# Patient Record
Sex: FEMALE | Race: WHITE | NOT HISPANIC OR LATINO | Employment: FULL TIME | URBAN - METROPOLITAN AREA
[De-identification: names, ages, dates, MRNs, and addresses within clinical notes are randomized per-mention and may not be internally consistent; named-entity substitution may affect disease eponyms.]

---

## 2018-03-05 ENCOUNTER — OFFICE VISIT (OUTPATIENT)
Dept: URGENT CARE | Facility: CLINIC | Age: 63
End: 2018-03-05
Payer: COMMERCIAL

## 2018-03-05 ENCOUNTER — TELEPHONE (OUTPATIENT)
Dept: INTERNAL MEDICINE | Facility: CLINIC | Age: 63
End: 2018-03-05

## 2018-03-05 VITALS
HEIGHT: 67 IN | RESPIRATION RATE: 16 BRPM | WEIGHT: 205 LBS | BODY MASS INDEX: 32.18 KG/M2 | DIASTOLIC BLOOD PRESSURE: 76 MMHG | HEART RATE: 61 BPM | SYSTOLIC BLOOD PRESSURE: 137 MMHG | TEMPERATURE: 98 F | OXYGEN SATURATION: 96 %

## 2018-03-05 DIAGNOSIS — S99.911A RIGHT ANKLE INJURY, INITIAL ENCOUNTER: ICD-10-CM

## 2018-03-05 DIAGNOSIS — S82.839A CLOSED FRACTURE OF DISTAL END OF FIBULA, UNSPECIFIED FRACTURE MORPHOLOGY, INITIAL ENCOUNTER: Primary | ICD-10-CM

## 2018-03-05 PROCEDURE — 99214 OFFICE O/P EST MOD 30 MIN: CPT | Mod: S$GLB,,, | Performed by: NURSE PRACTITIONER

## 2018-03-05 NOTE — TELEPHONE ENCOUNTER
Spoke with pt, she hasn't been seen since 2015. Offered pt UC appt for today, pt declined, stated that she will go to UC clinic on [address] that is closer to her house.

## 2018-03-05 NOTE — TELEPHONE ENCOUNTER
----- Message from Grace Lundberg sent at 3/5/2018 10:38 AM CST -----  Contact: self  Patient states need to speak with nurse.  Pt states was walking twisted right ankle   Pt requesting Xray.   Please call pt at [phone]

## 2018-03-06 NOTE — PROGRESS NOTES
"Subjective:       Patient ID: Nano Sosa is a 62 y.o. female.    Vitals:  height is 5' 7" (1.702 m) and weight is 93 kg (205 lb). Her oral temperature is 98.3 °F (36.8 °C). Her blood pressure is 137/76 and her pulse is 61. Her respiration is 16 and oxygen saturation is 96%.     Chief Complaint: Fall (rolled right ankle-right ankle pain)    Patient reports lost balance rolling on her right ankle and fell yesterday. She has compressed and iced the site, but it is still painful and slightly swollen.  It is just beginning to bruise.  She states she had a fibular fracture in distant past at a higher level.      Fall   The accident occurred 12 to 24 hours ago. The fall occurred while walking. She landed on concrete. There was no blood loss. The point of impact was the right foot. The pain is present in the right foot. The pain is at a severity of 6/10. The pain is moderate. The symptoms are aggravated by ambulation and pressure on injury. Pertinent negatives include no abdominal pain, hematuria or numbness. She has tried acetaminophen for the symptoms. The treatment provided no relief.     Review of Systems   Constitution: Negative for malaise/fatigue.   HENT: Negative for nosebleeds.    Cardiovascular: Negative for chest pain and syncope.   Respiratory: Negative for shortness of breath.    Musculoskeletal: Positive for falls, joint pain and joint swelling. Negative for back pain and neck pain.        Right ankle "rolled" and fell yesterday    Gastrointestinal: Negative for abdominal pain.   Genitourinary: Negative for hematuria.   Neurological: Negative for dizziness and numbness.       Objective:      Physical Exam   Constitutional: She is oriented to person, place, and time. She appears well-developed and well-nourished. She is cooperative.  Non-toxic appearance. She does not appear ill. No distress.   HENT:   Head: Normocephalic and atraumatic. Head is without abrasion, without contusion and without " laceration.   Right Ear: Hearing, tympanic membrane, external ear and ear canal normal. No hemotympanum.   Left Ear: Hearing, tympanic membrane, external ear and ear canal normal. No hemotympanum.   Nose: Nose normal. No mucosal edema, rhinorrhea or nasal deformity. No epistaxis. Right sinus exhibits no maxillary sinus tenderness and no frontal sinus tenderness. Left sinus exhibits no maxillary sinus tenderness and no frontal sinus tenderness.   Mouth/Throat: Uvula is midline and mucous membranes are normal. No trismus in the jaw. Normal dentition. No uvula swelling. No posterior oropharyngeal erythema.   Eyes: Conjunctivae, EOM and lids are normal. Pupils are equal, round, and reactive to light. Right eye exhibits no discharge. Left eye exhibits no discharge. No scleral icterus.   Sclera clear bilat   Neck: Trachea normal, normal range of motion, full passive range of motion without pain and phonation normal. Neck supple. No spinous process tenderness and no muscular tenderness present. No neck rigidity. No tracheal deviation present.   Cardiovascular: Normal rate, regular rhythm, normal heart sounds, intact distal pulses and normal pulses.    Pulses:       Posterior tibial pulses are 2+ on the right side.   Pulmonary/Chest: Effort normal and breath sounds normal. No respiratory distress.   Abdominal: Normal appearance. She exhibits no pulsatile midline mass.   Musculoskeletal: She exhibits no edema or deformity.        Right ankle: She exhibits decreased range of motion, swelling and ecchymosis. She exhibits no deformity, no laceration and normal pulse. Tenderness. Lateral malleolus tenderness found.        Feet:    Neurological: She is alert and oriented to person, place, and time. She has normal strength. No cranial nerve deficit or sensory deficit. She exhibits normal muscle tone. She displays no seizure activity. Coordination normal. GCS eye subscore is 4. GCS verbal subscore is 5. GCS motor subscore is 6.    Skin: Skin is warm, dry and intact. Capillary refill takes less than 2 seconds. No abrasion, no bruising, no burn, no ecchymosis and no laceration noted. She is not diaphoretic. No pallor.   Psychiatric: She has a normal mood and affect. Her speech is normal and behavior is normal. Judgment and thought content normal. Cognition and memory are normal.   Nursing note and vitals reviewed.      Right ankle Xray:  Closed non-displaced distal fibula fracture  (Radiology interpretation feels it is of a chronic or remote origin)  Assessment:       1. Closed fracture of distal end of fibula, unspecified fracture morphology, initial encounter    2. Right ankle injury, initial encounter        Plan:         Closed fracture of distal end of fibula, unspecified fracture morphology, initial encounter  -     Ambulatory referral to Orthopedics  -     ORTHOPEDIC BRACING FOR HOME USE - LOWER EXTREMITY  -     CRUTCHES FOR HOME USE    Right ankle injury, initial encounter  -     X-Ray Ankle Complete Right; Future; Expected date: 03/05/2018  -     Ambulatory referral to Orthopedics    Offered patient medication to deal with pain and she declined preferring OTC pain med.    Patient Instructions     Ankle Fracture, Distal Fibula  You have a fracture, or broken bone, of the end of the fibula bone. The fibula is one of two bones that support the ankle joint.    Home care  · You will be given a splint, cast, or special boot to prevent movement at the injury site. Do not put weight on a splint. It will break. Follow your healthcare providers advice about when to begin bearing weight on a cast or boot.  · Keep your leg elevated when sitting or lying down. When sleeping, place a pillow under the injured leg. When sitting, support the injured leg so it is level with your waist. This is very important during the first 48 hours.  · Keep the cast or splint completely dry at all times. When bathing, protect the cast or splint with 2 large plastic  bags. Place 1 bag outside of the other. Tape each bag with duct tape at the top end. Water can still leak in even when the foot is covered. So it's best to keep the cast, splint, or boot away from water. If a fiberglass cast or splint gets wet, dry it with a hair dryer on a cool setting.  · Place an ice pack over the injured area for no more than 15 to 20 minutes. Do this every 3 to 6 hours for the first 24 to 48 hours. Continue this 3 to 4 times a day as needed. To make an ice pack, put ice cubes in a plastic bag that seals at the top. Wrap the bag in a clean, thin towel or cloth. Never put ice or an ice pack directly on the skin. The ice pack can be put right on the cast or splint. As the ice melts, be careful that the cast or splint doesnt get wet.  · You may use over-the-counter pain medicine to control pain, unless another pain medicine was prescribed. If you have chronic liver or kidney disease or ever had a stomach ulcer or GI bleeding, talk with your provider before using these medicines.  Follow-up care  Follow up with your healthcare provider in 1 week, or as advised. This is to be sure the bone is healing properly. If you were given a splint, it may be changed to a cast after the swelling goes down.  If X-rays were taken, you will be told of any new findings that may affect your care.  When to seek medical advice  Call your healthcare provider right away if any of these occur:  · The plaster cast or splint becomes wet or soft  · The fiberglass cast or splint stays wet for more than 24 hours  · There is increased tightness or pain under the cast or splint  · Your toes become swollen, cold, blue, numb, or tingly  · The cast becomes loose  · The cast has a bad smell  · Sore areas develop under the cast  · The cast develops cracks or breaks   Date Last Reviewed: 11/23/2015  © 7293-0374 [employer]. [address] [place] [address], [place] [address]. All rights reserved. This information is not intended  as a substitute for professional medical care. Always follow your healthcare professional's instructions.

## 2018-03-06 NOTE — PATIENT INSTRUCTIONS
Ankle Fracture, Distal Fibula  You have a fracture, or broken bone, of the end of the fibula bone. The fibula is one of two bones that support the ankle joint.    Home care  · You will be given a splint, cast, or special boot to prevent movement at the injury site. Do not put weight on a splint. It will break. Follow your healthcare providers advice about when to begin bearing weight on a cast or boot.  · Keep your leg elevated when sitting or lying down. When sleeping, place a pillow under the injured leg. When sitting, support the injured leg so it is level with your waist. This is very important during the first 48 hours.  · Keep the cast or splint completely dry at all times. When bathing, protect the cast or splint with 2 large plastic bags. Place 1 bag outside of the other. Tape each bag with duct tape at the top end. Water can still leak in even when the foot is covered. So it's best to keep the cast, splint, or boot away from water. If a fiberglass cast or splint gets wet, dry it with a hair dryer on a cool setting.  · Place an ice pack over the injured area for no more than 15 to 20 minutes. Do this every 3 to 6 hours for the first 24 to 48 hours. Continue this 3 to 4 times a day as needed. To make an ice pack, put ice cubes in a plastic bag that seals at the top. Wrap the bag in a clean, thin towel or cloth. Never put ice or an ice pack directly on the skin. The ice pack can be put right on the cast or splint. As the ice melts, be careful that the cast or splint doesnt get wet.  · You may use over-the-counter pain medicine to control pain, unless another pain medicine was prescribed. If you have chronic liver or kidney disease or ever had a stomach ulcer or GI bleeding, talk with your provider before using these medicines.  Follow-up care  Follow up with your healthcare provider in 1 week, or as advised. This is to be sure the bone is healing properly. If you were given a splint, it may be changed to a  cast after the swelling goes down.  If X-rays were taken, you will be told of any new findings that may affect your care.  When to seek medical advice  Call your healthcare provider right away if any of these occur:  · The plaster cast or splint becomes wet or soft  · The fiberglass cast or splint stays wet for more than 24 hours  · There is increased tightness or pain under the cast or splint  · Your toes become swollen, cold, blue, numb, or tingly  · The cast becomes loose  · The cast has a bad smell  · Sore areas develop under the cast  · The cast develops cracks or breaks   Date Last Reviewed: 11/23/2015  © 2527-7239 [employer]. [address] [place] [address], [place] [address]. All rights reserved. This information is not intended as a substitute for professional medical care. Always follow your healthcare professional's instructions.

## 2018-03-08 ENCOUNTER — TELEPHONE (OUTPATIENT)
Dept: INTERNAL MEDICINE | Facility: CLINIC | Age: 63
End: 2018-03-08

## 2018-03-08 ENCOUNTER — TELEPHONE (OUTPATIENT)
Dept: URGENT CARE | Facility: CLINIC | Age: 63
End: 2018-03-08

## 2018-03-08 DIAGNOSIS — Z12.31 SCREENING MAMMOGRAM, ENCOUNTER FOR: Primary | ICD-10-CM

## 2018-03-08 NOTE — TELEPHONE ENCOUNTER
----- Message from Shawna Fowler sent at 3/8/2018  8:20 AM CST -----  Contact: YouTube request  Message     Appointment Request From: Nano Sosa    With Provider: Other - (see comments)    Would Accept With:Only the person I've selected    Preferred Date Range: From 3/12/2018 To 3/16/2018    Preferred Times:     Monday, Wednesday, Thursday     Any time    Reason for visit: Request an Appt    Comments:  Need a Mammogram

## 2018-03-08 NOTE — TELEPHONE ENCOUNTER
Spoke with pt, transfer care appt made for May. Pt would like to have mammogram she is overdue for. Please, sign the pending order.

## 2018-03-14 ENCOUNTER — OFFICE VISIT (OUTPATIENT)
Dept: INTERNAL MEDICINE | Facility: CLINIC | Age: 63
End: 2018-03-14
Payer: COMMERCIAL

## 2018-03-14 ENCOUNTER — LAB VISIT (OUTPATIENT)
Dept: LAB | Facility: HOSPITAL | Age: 63
End: 2018-03-14
Attending: NURSE PRACTITIONER
Payer: COMMERCIAL

## 2018-03-14 VITALS
WEIGHT: 220.88 LBS | BODY MASS INDEX: 34.67 KG/M2 | HEIGHT: 67 IN | HEART RATE: 61 BPM | SYSTOLIC BLOOD PRESSURE: 118 MMHG | DIASTOLIC BLOOD PRESSURE: 74 MMHG | OXYGEN SATURATION: 97 %

## 2018-03-14 DIAGNOSIS — R31.9 HEMATURIA, UNSPECIFIED TYPE: ICD-10-CM

## 2018-03-14 DIAGNOSIS — Z00.00 ANNUAL PHYSICAL EXAM: Primary | ICD-10-CM

## 2018-03-14 DIAGNOSIS — R31.9 HEMATURIA, UNSPECIFIED TYPE: Primary | ICD-10-CM

## 2018-03-14 DIAGNOSIS — N89.8 VAGINAL DISCHARGE: ICD-10-CM

## 2018-03-14 DIAGNOSIS — Z86.39 HISTORY OF HASHIMOTO THYROIDITIS: ICD-10-CM

## 2018-03-14 DIAGNOSIS — E04.1 THYROID NODULE: ICD-10-CM

## 2018-03-14 DIAGNOSIS — M85.80 OSTEOPENIA, UNSPECIFIED LOCATION: ICD-10-CM

## 2018-03-14 LAB
AMORPH CRY UR QL COMP ASSIST: ABNORMAL
BACTERIA #/AREA URNS AUTO: ABNORMAL /HPF
BILIRUB SERPL-MCNC: NORMAL MG/DL
BILIRUB UR QL STRIP: NEGATIVE
BLOOD URINE, POC: NORMAL
CLARITY UR REFRACT.AUTO: ABNORMAL
COLOR UR AUTO: YELLOW
COLOR, POC UA: NORMAL
GLUCOSE UR QL STRIP: NEGATIVE
GLUCOSE UR QL STRIP: NORMAL
HGB UR QL STRIP: ABNORMAL
KETONES UR QL STRIP: NEGATIVE
KETONES UR QL STRIP: NORMAL
LEUKOCYTE ESTERASE UR QL STRIP: ABNORMAL
LEUKOCYTE ESTERASE URINE, POC: NORMAL
MICROSCOPIC COMMENT: ABNORMAL
NITRITE UR QL STRIP: POSITIVE
NITRITE, POC UA: NORMAL
PH UR STRIP: 5 [PH] (ref 5–8)
PH, POC UA: 5
PROT UR QL STRIP: NEGATIVE
PROTEIN, POC: NORMAL
RBC #/AREA URNS AUTO: 2 /HPF (ref 0–4)
SP GR UR STRIP: 1.01 (ref 1–1.03)
SPECIFIC GRAVITY, POC UA: 1.01
URN SPEC COLLECT METH UR: ABNORMAL
UROBILINOGEN UR STRIP-ACNC: NEGATIVE EU/DL
UROBILINOGEN, POC UA: NORMAL
WBC #/AREA URNS AUTO: 3 /HPF (ref 0–5)

## 2018-03-14 PROCEDURE — 81002 URINALYSIS NONAUTO W/O SCOPE: CPT | Mod: S$GLB,,, | Performed by: NURSE PRACTITIONER

## 2018-03-14 PROCEDURE — 87088 URINE BACTERIA CULTURE: CPT

## 2018-03-14 PROCEDURE — 81001 URINALYSIS AUTO W/SCOPE: CPT

## 2018-03-14 PROCEDURE — 87186 SC STD MICRODIL/AGAR DIL: CPT

## 2018-03-14 PROCEDURE — 87077 CULTURE AEROBIC IDENTIFY: CPT

## 2018-03-14 PROCEDURE — 87086 URINE CULTURE/COLONY COUNT: CPT

## 2018-03-14 PROCEDURE — 99396 PREV VISIT EST AGE 40-64: CPT | Mod: 25,S$GLB,, | Performed by: NURSE PRACTITIONER

## 2018-03-14 PROCEDURE — 99999 PR PBB SHADOW E&M-EST. PATIENT-LVL IV: CPT | Mod: PBBFAC,,, | Performed by: NURSE PRACTITIONER

## 2018-03-14 NOTE — PROGRESS NOTES
"Internal Medicine Annual Exam       CHIEF COMPLAINT     The patient, Nano Sosa, who is a 62 y.o. female presents for an annual exam.    HPI   62 year old female with thyroid nodule, hashimoto's thyroiditis, UC, osteopenia and h/o colon polyps/rectal mass - here for annual physical   Pt of Dr. Rodriguez.     Weight gain- over the last few months. Stopped exercising 2/2 fatigue and joint pain. "too much for me with work and everything else".     Vaginal discharge- hysterectomy 2/2 uterine fibroids. Grey-green color. Denies itching and burning. +dysuria; feels like she about to get a UTI     UC- h/o UC was treated by holistic practitioner who treated her, has not had issues since.     C-scope- 2016 repeat in 2 years.   MMG- needed has scheduled.    Gyn- needed for WWE     Past Medical History:  Past Medical History:   Diagnosis Date    Allergy     General anesthetics causing adverse effect in therapeutic use     wakes up hyperventilating/chills    Goiter     Hyperthyroidism     Hashimoto's    Urinary tract infection        Past Surgical History:   Procedure Laterality Date    BUNIONECTOMY      right    colon sx      COLONOSCOPY N/A 12/19/2016    Procedure: COLONOSCOPY;  Surgeon: Alli Peña MD;  Location: [address]);  Service: Endoscopy;  Laterality: N/A;    f.e.s.s.  12/5/2014    FACIAL COSMETIC SURGERY      HYSTERECTOMY      partial        Family History   Problem Relation Age of Onset    Rheum arthritis Mother     Migraines Mother     Cancer Maternal Aunt     Heart disease Maternal Uncle     Anesthesia problems Neg Hx         Social History     Social History    Marital status: Single     Spouse name: N/A    Number of children: N/A    Years of education: N/A     Occupational History    Not on file.     Social History Main Topics    Smoking status: Former Smoker     Packs/day: 1.00     Years: 30.00     Types: Cigarettes     Quit date: 8/21/1999    Smokeless tobacco: Never " Used    Alcohol use 1.2 oz/week     2 Standard drinks or equivalent per week      Comment: weekends    Drug use: No    Sexual activity: Yes     Partners: Male     Other Topics Concern    Not on file     Social History Narrative    No narrative on file        History   Smoking Status    Former Smoker    Packs/day: 1.00    Years: 30.00    Types: Cigarettes    Quit date: 8/21/1999   Smokeless Tobacco    Never Used        Allergies as of 03/14/2018 - Reviewed 03/14/2018   Allergen Reaction Noted    Penicillins Shortness Of Breath 11/09/2012    Codeine Itching 11/09/2012    Sulfa (sulfonamide antibiotics) Hives and Swelling 11/09/2012    Tessalon [benzonatate] Swelling 11/09/2012    Unable to assess  06/30/2016    Erythromycin Rash 11/09/2012          Home Medications:  Prior to Admission medications    Medication Sig Start Date End Date Taking? Authorizing Provider   b complex vitamins tablet Take 1 tablet by mouth once daily.   Yes Historical Provider, MD   cholecalciferol, vitamin D3, 2,000 unit Tab Take 1 tablet by mouth once daily.   Yes Historical Provider, MD   multivitamin capsule Take 1 capsule by mouth once daily.   Yes Historical Provider, MD   sodium,potassium,mag sulfates (SUPREP BOWEL PREP KIT) 17.5-3.13-1.6 gram SolR Take 1 kit by mouth as directed. 10/3/16   Alli Peña MD       Review of Systems:  Review of Systems   Constitutional: Positive for fatigue and unexpected weight change (weight gain. ). Negative for activity change, chills and fever.   HENT: Negative for hearing loss, rhinorrhea and trouble swallowing.    Eyes: Negative for discharge and visual disturbance.   Respiratory: Positive for cough (pnd). Negative for chest tightness, shortness of breath and wheezing.    Cardiovascular: Negative for chest pain and palpitations.   Gastrointestinal: Negative for blood in stool, constipation, diarrhea and vomiting.   Endocrine: Positive for cold intolerance. Negative for heat  "intolerance, polydipsia and polyuria.   Genitourinary: Positive for vaginal discharge. Negative for difficulty urinating, dysuria, hematuria, menstrual problem and vaginal bleeding.   Musculoskeletal: Negative for arthralgias, joint swelling and neck pain.   Skin: Negative for rash.   Neurological: Negative for dizziness, weakness, light-headedness and headaches.   Psychiatric/Behavioral: Negative for confusion, dysphoric mood and sleep disturbance. The patient is not nervous/anxious.        Health Maintainence:   Immunizations:  Health Maintenance       Date Due Completion Date    TETANUS VACCINE 04/29/1971 ---    Influenza Vaccine 08/01/2017 12/17/2015 (Declined)    Override on 12/17/2015: Declined    Mammogram 12/18/2017 12/18/2015    Lipid Panel 12/18/2020 12/18/2015    Colonoscopy 12/19/2026 12/19/2016           PHYSICAL EXAM     /74 (BP Location: Left arm, Patient Position: Sitting, BP Method: Large (Manual))   Pulse 61   Ht 5' 7" (1.702 m)   Wt 100.2 kg (220 lb 14.4 oz)   SpO2 97%   BMI 34.60 kg/m²  Body mass index is 34.6 kg/m².    Physical Exam   Constitutional: She is oriented to person, place, and time. She appears well-developed and well-nourished.   HENT:   Head: Normocephalic.   Right Ear: External ear normal.   Left Ear: External ear normal.   Nose: Nose normal.   Mouth/Throat: Oropharynx is clear and moist. No oropharyngeal exudate.   Eyes: Pupils are equal, round, and reactive to light.   Neck: Neck supple. No JVD present. No tracheal deviation present. No thyromegaly present.   Cardiovascular: Normal rate, regular rhythm, normal heart sounds and intact distal pulses.  Exam reveals no gallop and no friction rub.    No murmur heard.  Pulmonary/Chest: Effort normal and breath sounds normal. No respiratory distress. She has no wheezes. She has no rales.   Abdominal: Soft. Bowel sounds are normal. She exhibits no distension. There is no tenderness.   Musculoskeletal: Normal range of motion. " She exhibits no edema or tenderness.   Lymphadenopathy:     She has no cervical adenopathy.   Neurological: She is alert and oriented to person, place, and time.   Skin: Skin is warm and dry. No rash noted.   Psychiatric: She has a normal mood and affect. Her behavior is normal.   Vitals reviewed.      LABS     Lab Results   Component Value Date    HGBA1C 5.3 12/18/2015     CMP  Sodium   Date Value Ref Range Status   07/21/2016 138 136 - 145 mmol/L Final     Potassium   Date Value Ref Range Status   07/21/2016 3.9 3.5 - 5.1 mmol/L Final     Chloride   Date Value Ref Range Status   07/21/2016 107 95 - 110 mmol/L Final     CO2   Date Value Ref Range Status   07/21/2016 21 (L) 23 - 29 mmol/L Final     Glucose   Date Value Ref Range Status   07/21/2016 105 70 - 110 mg/dL Final     BUN, Bld   Date Value Ref Range Status   07/21/2016 13 8 - 23 mg/dL Final     Creatinine   Date Value Ref Range Status   07/21/2016 0.7 0.5 - 1.4 mg/dL Final     Calcium   Date Value Ref Range Status   07/21/2016 8.6 (L) 8.7 - 10.5 mg/dL Final     Total Protein   Date Value Ref Range Status   07/15/2016 6.9 6.0 - 8.4 g/dL Final     Albumin   Date Value Ref Range Status   07/15/2016 3.9 3.5 - 5.2 g/dL Final     Total Bilirubin   Date Value Ref Range Status   07/15/2016 0.7 0.1 - 1.0 mg/dL Final     Comment:     For infants and newborns, interpretation of results should be based  on gestational age, weight and in agreement with clinical  observations.  Premature Infant recommended reference ranges:  Up to 24 hours.............<8.0 mg/dL  Up to 48 hours............<12.0 mg/dL  3-5 days..................<15.0 mg/dL  6-29 days.................<15.0 mg/dL       Alkaline Phosphatase   Date Value Ref Range Status   07/15/2016 56 55 - 135 U/L Final     AST   Date Value Ref Range Status   07/15/2016 14 10 - 40 U/L Final     ALT   Date Value Ref Range Status   07/15/2016 13 10 - 44 U/L Final     Anion Gap   Date Value Ref Range Status   07/21/2016 10 8 -  16 mmol/L Final     eGFR if    Date Value Ref Range Status   07/21/2016 >60.0 >60 mL/min/1.73 m^2 Final     eGFR if non    Date Value Ref Range Status   07/21/2016 >60.0 >60 mL/min/1.73 m^2 Final     Comment:     Calculation used to obtain the estimated glomerular filtration  rate (eGFR) is the CKD-EPI equation. Since race is unknown   in our information system, the eGFR values for   -American and Non--American patients are given   for each creatinine result.       Lab Results   Component Value Date    WBC 12.42 07/21/2016    HGB 11.6 (L) 07/21/2016    HCT 36.5 (L) 07/21/2016    MCV 93 07/21/2016     07/21/2016     Lab Results   Component Value Date    CHOL 240 (H) 12/18/2015    CHOL 170 10/02/2014    CHOL 195 08/20/2011     Lab Results   Component Value Date    HDL 75 12/18/2015    HDL 58 10/02/2014    HDL 48 08/20/2011     Lab Results   Component Value Date    LDLCALC 141.0 12/18/2015    LDLCALC 93.8 10/02/2014    LDLCALC 118.0 08/20/2011     Lab Results   Component Value Date    TRIG 120 12/18/2015    TRIG 91 10/02/2014    TRIG 145 08/20/2011     Lab Results   Component Value Date    CHOLHDL 31.3 12/18/2015    CHOLHDL 34.1 10/02/2014    CHOLHDL 24.6 08/20/2011     Lab Results   Component Value Date    TSH 2.255 12/18/2015       ASSESSMENT/PLAN     Nano Sosa is a 62 y.o. female with  Past Medical History:   Diagnosis Date    Allergy     General anesthetics causing adverse effect in therapeutic use     wakes up hyperventilating/chills    Goiter     Hyperthyroidism     Hashimoto's    Urinary tract infection        Annual physical exam- all age and gender related screenings discussed   -     CBC auto differential; Future; Expected date: 03/14/2018  -     Comprehensive metabolic panel; Future; Expected date: 03/14/2018  -     TSH; Future; Expected date: 03/14/2018  -     T4, free; Future; Expected date: 03/14/2018  -     Lipid panel; Future; Expected  date: 03/14/2018  -     Vitamin D; Future; Expected date: 03/14/2018  -     POCT urine dipstick without microscope    History of Hashimoto thyroiditis- weight gain, will check thyroid studies. Discussed diet and exercise.   -     THYROID PEROXIDASE ANTIBODY; Future; Expected date: 03/14/2018    Thyroid nodule- will check thyroid studies.     Osteopenia, unspecified location- stable, repeat Dexa in December     Vaginal discharge- u/a today, refer to gyn - needs wwe.   -     Ambulatory Referral to Gynecology          Follow up with PCP in 6 months     Heydi NAZARIO, APRN, FNP-c   Department of Internal Medicine - [facility]  8:36 AM

## 2018-03-16 ENCOUNTER — PATIENT MESSAGE (OUTPATIENT)
Dept: INTERNAL MEDICINE | Facility: CLINIC | Age: 63
End: 2018-03-16

## 2018-03-16 DIAGNOSIS — N39.0 URINARY TRACT INFECTION WITHOUT HEMATURIA, SITE UNSPECIFIED: Primary | ICD-10-CM

## 2018-03-16 RX ORDER — CIPROFLOXACIN 250 MG/1
250 TABLET, FILM COATED ORAL EVERY 12 HOURS
Qty: 6 TABLET | Refills: 0 | Status: SHIPPED | OUTPATIENT
Start: 2018-03-16 | End: 2018-03-20

## 2018-03-17 LAB — BACTERIA UR CULT: NORMAL

## 2018-03-19 ENCOUNTER — PATIENT MESSAGE (OUTPATIENT)
Dept: INTERNAL MEDICINE | Facility: CLINIC | Age: 63
End: 2018-03-19

## 2018-03-19 ENCOUNTER — HOSPITAL ENCOUNTER (OUTPATIENT)
Dept: RADIOLOGY | Facility: HOSPITAL | Age: 63
Discharge: HOME | End: 2018-03-19
Attending: INTERNAL MEDICINE
Payer: COMMERCIAL

## 2018-03-19 DIAGNOSIS — Z12.31 SCREENING MAMMOGRAM, ENCOUNTER FOR: ICD-10-CM

## 2018-03-19 PROCEDURE — 77067 SCR MAMMO BI INCL CAD: CPT | Mod: 26,,, | Performed by: RADIOLOGY

## 2018-03-19 PROCEDURE — 77063 BREAST TOMOSYNTHESIS BI: CPT | Mod: 26,,, | Performed by: RADIOLOGY

## 2018-03-19 PROCEDURE — 77067 SCR MAMMO BI INCL CAD: CPT | Mod: TC

## 2018-03-20 ENCOUNTER — TELEPHONE (OUTPATIENT)
Dept: INTERNAL MEDICINE | Facility: CLINIC | Age: 63
End: 2018-03-20

## 2018-03-20 DIAGNOSIS — N39.0 URINARY TRACT INFECTION WITHOUT HEMATURIA, SITE UNSPECIFIED: Primary | ICD-10-CM

## 2018-03-20 RX ORDER — NITROFURANTOIN 25; 75 MG/1; MG/1
100 CAPSULE ORAL 2 TIMES DAILY
Qty: 14 CAPSULE | Refills: 0 | Status: SHIPPED | OUTPATIENT
Start: 2018-03-20 | End: 2018-04-17 | Stop reason: SDUPTHER

## 2018-03-21 ENCOUNTER — OFFICE VISIT (OUTPATIENT)
Dept: ORTHOPEDICS | Facility: CLINIC | Age: 63
End: 2018-03-21
Payer: COMMERCIAL

## 2018-03-21 ENCOUNTER — HOSPITAL ENCOUNTER (OUTPATIENT)
Dept: RADIOLOGY | Facility: HOSPITAL | Age: 63
Discharge: HOME | End: 2018-03-21
Attending: PHYSICIAN ASSISTANT
Payer: COMMERCIAL

## 2018-03-21 VITALS
HEART RATE: 61 BPM | SYSTOLIC BLOOD PRESSURE: 127 MMHG | WEIGHT: 220.88 LBS | BODY MASS INDEX: 34.67 KG/M2 | HEIGHT: 67 IN | DIASTOLIC BLOOD PRESSURE: 86 MMHG

## 2018-03-21 DIAGNOSIS — R52 PAIN: ICD-10-CM

## 2018-03-21 DIAGNOSIS — M25.371 INSTABILITY OF RIGHT ANKLE JOINT: Primary | ICD-10-CM

## 2018-03-21 PROCEDURE — 99203 OFFICE O/P NEW LOW 30 MIN: CPT | Mod: S$GLB,,, | Performed by: PHYSICIAN ASSISTANT

## 2018-03-21 PROCEDURE — 73610 X-RAY EXAM OF ANKLE: CPT | Mod: 26,RT,, | Performed by: RADIOLOGY

## 2018-03-21 PROCEDURE — 99999 PR PBB SHADOW E&M-EST. PATIENT-LVL III: CPT | Mod: PBBFAC,,, | Performed by: PHYSICIAN ASSISTANT

## 2018-03-21 PROCEDURE — 73610 X-RAY EXAM OF ANKLE: CPT | Mod: TC,RT

## 2018-03-21 RX ORDER — DICLOFENAC SODIUM 10 MG/G
2 GEL TOPICAL 4 TIMES DAILY
Qty: 400 G | Refills: 0 | Status: SHIPPED | OUTPATIENT
Start: 2018-03-21 | End: 2023-03-02

## 2018-03-21 NOTE — LETTER
March 21, 2018      Pacheco Luna, NATALIYA  [address]  [address]  [facility] [address]           [facility] Orthoped[facility]  [address], [address]  [facility] [address]  Phone: [phone]          Patient: Nano Sosa   MR Number: [id number]   YOB: 1955   Date of Visit: 3/21/2018       Dear Pacheco Luna:    Thank you for referring Nano Sosa to me for evaluation. Attached you will find relevant portions of my assessment and plan of care.    If you have questions, please do not hesitate to call me. I look forward to following Nano Sosa along with you.    Sincerely,    Joey Tobar PA-C    Enclosure  CC:  No Recipients    If you would like to receive this communication electronically, please contact [email or web address] or [phone] to request more information on [employer] Link access.    For providers and/or their staff who would like to refer a patient to [facility], please contact us through our one-stop-shop provider referral line, [facility] , at [phone].    If you feel you have received this communication in error or would no longer like to receive these types of communications, please e-mail [email or web address]

## 2018-03-21 NOTE — PROGRESS NOTES
"  SUBJECTIVE:     Chief Complaint & History of Present Illness:  Nano Sosa is a  New  patient 62 y.o. female who is seen here today with a complaint of    Chief Complaint   Patient presents with    Right Ankle - Pain    .  History today for evaluation treatment of some chronic right ankle pain and instability dating back several years.  Her symptoms have been exacerbated lately secondary to an increase in activity and she's having some concerns with the the pain instability as relates to a trip she's planning overseas seen a few months and she states she has had a multiple injuries to the ankles in past years on but has never been formally treated.  She relates "chronicrotations chronic ankle sprains"  On a scale of 1-10, with 10 being worst pain imaginable, he rates this pain as 4 on good days and 5 on bad days.  she describes the pain as tender and sore.    Review of patient's allergies indicates:   Allergen Reactions    Penicillins Shortness Of Breath     Throat swelling    Codeine Itching     Hallucinations, sweats    Sulfa (sulfonamide antibiotics) Hives and Swelling    Tessalon [benzonatate] Swelling    Unable to assess      TUNA--Flushing of skin    Erythromycin Rash         Current Outpatient Prescriptions   Medication Sig Dispense Refill    b complex vitamins tablet Take 1 tablet by mouth once daily.      cholecalciferol, vitamin D3, 2,000 unit Tab Take 1 tablet by mouth once daily.      multivitamin capsule Take 1 capsule by mouth once daily.      nitrofurantoin, macrocrystal-monohydrate, (MACROBID) 100 MG capsule Take 1 capsule (100 mg total) by mouth 2 (two) times daily. 14 capsule 0    sodium,potassium,mag sulfates (SUPREP BOWEL PREP KIT) 17.5-3.13-1.6 gram SolR Take 1 kit by mouth as directed. 354 mL 0    diclofenac sodium 1 % Gel Apply 2 g topically 4 (four) times daily. 400 g 0     No current facility-administered medications for this visit.        Past Medical History: " "  Diagnosis Date    Allergy     General anesthetics causing adverse effect in therapeutic use     wakes up hyperventilating/chills    Goiter     Hyperthyroidism     Hashimoto's    Urinary tract infection        Past Surgical History:   Procedure Laterality Date    BUNIONECTOMY      right    colon sx      COLONOSCOPY N/A 12/19/2016    Procedure: COLONOSCOPY;  Surgeon: Alli Peña MD;  Location: [facility] ([address] [place] [address]);  Service: Endoscopy;  Laterality: N/A;    f.e.s.s.  12/5/2014    FACIAL COSMETIC SURGERY      HYSTERECTOMY      partial       Vital Signs (Most Recent)  Vitals:    03/21/18 0847   BP: 127/86   Pulse: 61       Review of Systems:  ROS:  Constitutional: no fever or chills  Eyes: no visual changes  ENT: no nasal congestion or sore throat  Respiratory: no cough or shortness of breath  Cardiovascular: no chest pain or palpitations  Gastrointestinal: no nausea or vomiting, tolerating diet  Genitourinary: no hematuria or dysuria  Integument/Breast: no rash or pruritis  Hematologic/Lymphatic: no easy bruising or lymphadenopathy, Positive thyroiditis vitamin D deficiency  Musculoskeletal: no arthralgias or myalgias  Neurological: no seizures or tremors  Behavioral/Psych: no auditory or visual hallucinations  Endocrine: no heat or cold intolerance      OBJECTIVE:     PHYSICAL EXAM:  Height: 5' 7" (170.2 cm) Weight: 100.2 kg (220 lb 14.4 oz), General Appearance: Well nourished, well developed, in no acute distress.  Neurological: Mood & affect are normal.  right  Foot/Ankle    Skin: normal  Swelling: none  Warmth: no warmth  Tenderness: mild  ROM: 90 degrees dorsiflexion, 180 degrees plantarflexion, 50 degrees inversion with a soft endpoint and 40 degrees eversion  Strength: normal and 5 on 5  Gait: normal  Stability: anterior drawer: negative, exterior rotation test: negative, Lachman: negative and Cotton test: negative    soft tissue swelling noted over the Medial " malleolus          RADIOGRAPHS:  X-rays taken today films reviewed by me demonstrate mild arthritic changes throughout the ankle with evidence of old fractures in both the medial and lateral malleoli with possible nonunion in the medial malleolus    ASSESSMENT/PLAN:     Plan:  Physical therapy for strength and range of motion exercises of the ankle  Patient will continue with lace up ankle brace for support and protection  Diclofenac 1% gel to affected area 3 times a day when necessary

## 2018-03-22 NOTE — PROGRESS NOTES
PHYSICAL THERAPY INITIAL EVALUATION     Date: 03/23/2018  Name: Nano Sosa  Clinic Number: [id number]    Visit #: 1   Start Time:  7:05  Stop Time:  800  Time in treatment: 55 minutes    Orders:      [facility] ORTHOPEDICS   Priority Start Date Expiration Date Referral Entered By   Routine 03/21/2018 12/31/2018 Joey Tobar PA-C   Visits Requested Visits Authorized Visits Completed Visits Scheduled   1 20 0 1       Diagnosis   M25.371 (ICD-10-CM) - Instability of right ankle joint   Referral Notes   Num      History   History of Present Illness: Pt relates she rolled her right ankle whild simply walking 3 weeks ago .  She has apparently had recurrent ankle sprains on the limb and has xray evidence of old fibular fractures.  She was initially in a cam walker for 1 week after injury but has been wearing shoes and an ankle support since injury    Treatment Diagnosis:   1. Right ankle instability         Past Medical History      Past Medical History:   Diagnosis Date    Allergy     General anesthetics causing adverse effect in therapeutic use     wakes up hyperventilating/chills    Goiter     Hyperthyroidism     Hashimoto's    Urinary tract infection        Allergies  Review of patient's allergies indicates:   Allergen Reactions    Penicillins Shortness Of Breath     Throat swelling    Codeine Itching     Hallucinations, sweats    Sulfa (sulfonamide antibiotics) Hives and Swelling    Tessalon [benzonatate] Swelling    Unable to assess      TUNA--Flushing of skin    Erythromycin Rash       Current Medication list:   Current Outpatient Prescriptions on File Prior to Visit   Medication Sig Dispense Refill    b complex vitamins tablet Take 1 tablet by mouth once daily.      cholecalciferol, vitamin D3, 2,000 unit Tab Take 1 tablet by mouth once daily.      diclofenac sodium 1 % Gel Apply 2 g topically 4 (four) times daily. 400 g 0    multivitamin capsule Take 1 capsule by mouth once daily.       "nitrofurantoin, macrocrystal-monohydrate, (MACROBID) 100 MG capsule Take 1 capsule (100 mg total) by mouth 2 (two) times daily. 14 capsule 0    sodium,potassium,mag sulfates (SUPREP BOWEL PREP KIT) 17.5-3.13-1.6 gram SolR Take 1 kit by mouth as directed. 354 mL 0     No current facility-administered medications on file prior to visit.        Precautions: Standard, Fall      Prior Therapy: no    Diagnostic Tests:   XR ANKLE COMPLETE 3 VIEW RIGHT    CLINICAL HISTORY:  pain;  Pain, unspecified    FINDINGS:  There is baseline DJD.  No fracture dislocation bone destruction seen.  There is a plantar fascial spur.  There is postoperative change of the 1st digit.       Right Ankle - Pain    .  History today for evaluation treatment of some chronic right ankle pain and instability dating back several years.  Her symptoms have been exacerbated lately secondary to an increase in activity and she's having some concerns with the the pain instability as relates to a trip she's planning overseas seen a few months and she states she has had a multiple injuries to the ankles in past years on but has never been formally treated.  She relates "chronicrotations chronic ankle sprains"      Living situation: active  Stairs in home/work?  yes  Are stairs bothersome?:  Descending is painful in anterior ankle   Work status: desk  Sports/Recreational Activities: na  Assistive devices/equipment ankle support on right    Cultural, Spiritual, Developmental and Educational concerns: none  Abuse/Neglect, Nutritional concerns: none     Patient Therapy Goals: improve strength of the right ankle    Subjective   Nano Sosa states she really is not currently having any significant ankle pain.  She is concerned because she is leaving on a  vacation soon and will need to walk a lot.  Thus she wants to strengthen her ankles.  Activities that increase symptoms:   Descending stairs  What decreases symptoms:   rest  Popping/clicking: " denies  Are symptoms changing since onset:  Greatly improved   Pain level with 0 being the lowest and 10 being at onset of treatment:  0  Pain level with 0 being the lowest and 10 being at conclusion of treatment:   0  Pain level at best: 0  Pain level at worst: 3  Has there been a change in functional status since onset of symptoms:   no  Current functional status:  Exercise routine prior to onset :  Bike, treadmill      Objective   62 y.o. White female arrives to clinic in [facility].  She is wearing normal athletic shoes with appropriate custom inserts.  She has an OTC tie up and velcro ankle support on the Right ankle    BP pre-treatment: nt    GAIT: no deviation without AD.    FOOT ALIGNMENT:  pronation    POSTURAL examination in standing normal posture with moderate bilateral foot pronation  JOINT ROM  AROM hips      WFL   AROM knees   WFL       Range of Motion: ANKLES   NORMAL/  FUNCTIONAL RIGHT   AAROM/AROM   Dorsiflexion   20/10' 15   PlantarFlex:   45/20' 40   Inversion      35/10' 40   Eversion   25/10 30               Joint Mobility: hypermobile all LE joints    AROM lumbo/sacral region  WFL  PSIS  level   Leg length discrepancy : none detected    LE MMT                Grossly 5/5 Right                 Grossly 5/5 Left  With following exceptions:                                             RIGHT                                  LEFT                        Inverters               4+/5                                       5/5               Everters                 4+/5                                       5/5             FLEXIBILITY:  Right:  Hamstrings extend to 0 degrees in supine with hip flexed to 90 degrees  Left:  Hamstrings extend  To 0 degrees in supine with hip flexed to 90 degrees  Gastroc/soleus: 15 degrees past midline        PALPATION: Tender to palpation at right calcaneal fibular ligament and peroneal tendons  Crepitus: none noted  Sensation:nt   Muscle  Tone:  Normal  Atrophy noted: no    EDEMA:    Left:absent  Right: minimal at lateral malleolus      TREATMENT: Evaluation completed  TREATMENT:   THEREX: 12 minutes of one to one instruction , manual and/or verbal cues for exercises designed to stretch, strengthen and build endurance of the affected body parts     10 reps Ankle plantar and dorsiflexion with maroon TBand provided to patient  10 reps inversion blue  theraband  provided to patient  10 reps eversion blue  theraband   provided to patient  10 reps up on toes  Ankle ABCs      Will add in next sessions:  Ankle press  10 reps lateral movement on rocker board  10 reps of AP, Lateral, and circular motion on Bosu board  Bilateral stance on Bosu board  Unilateral stance on Bosu board5 reps 10 sec hold Prostretch          Education   Patient was provided with a written copy of the above home exercises indicated in bold to perform as tolerated within limits of pain.  Exercises were reviewed and patient was able to demonstrate them prior to the end of the session.  Pt instructed in proper use of ice or heat to limb.    All of the patients questions were answered  Goals of therapy, the roles of PT and PTA, and the need for compliance of appointments, attendance policy and HEP was discussed with patient .  Patient expressed understanding and agreement with above education.      No barriers to learning or social/cultural issues were identified that could hinder therapy.    Assessment   Pt with recurrent right ankle sprains  felt  No changein symptoms post therapy and suffered no adverse effects with treatment.   .  Pt presents with  a stable and uncomplicated clinical presentation which includes: hypermobility of feet/LEs, weak mm, pronation of feet, painful extended walking   Patient can benefit from outpatient physical therapy and a home program  Prognosis to achieve below goals is good provided pt is compliant with home program and PT appointments..    Pt's spiritual, cultural and educational needs  considered and pt agreeable to plan of care and goals as stated below:    Medical necessity is demonstrated by the following impairments/problem list:    Requires skilled supervision to complete and progress HEP     Decreased standing tolerance    Decreased community ambulation    Impaired muscle strength   Pain    Anticipated barriers to physical therapy: going out of town for vacation    G Code   na           Goals   8weeks. Pt agrees with goals set.  Independent with HEP.  Report decrease in pain in the right  ankle/foot region  to less than 1  For extended standign and walkign greater than 3 blocks.    Increased MMT in 100% of weak mm that are below 5-/5 grade  by 1/3 grade to enable ambulation with appropriate gait cycle  Improve safety of gait and pt to report no falls curing course of therapy.  Goals beyond 8 weeks will be set at that time based on reassessment      Re-assessment due on or before:  4/21/18    PTA may be involved in patient care as part of the Rehab team.      Plan   Pt will be seen 1-2 times per week for 8 weeks.   Recommended Treatment Plan will include:  Manual soft tissue and/or joint mobilization  Therapeutic Exercise  Neuromuscular re-education          Individualized Home Exercise Program  Modalities: heat/ice, estim, US, dry needling as needed         Pt education    PTA may be involved in patient's care as part of the Rehab Team.        History  Co-morbidities and personal factors that may impact the plan of care Examination  Body Structures and Functions, activity limitations and participation restrictions that may impact the plan of care Clinical Presentation   Co-morbidities:   osteopenia    Personal Factors:   no deficits Body Regions:   lower extremities    Body Systems:    strength    Participation Restrictions:   no     Activity limitations:   Learning and applying knowledge  no deficits  General Tasks and Commands  no deficits  Communication  no deficits  Mobility  no  deficits  Self care  no deficits  Domestic Life  no deficits  Interactions/Relationships  no deficits  Life Areas  no deficits  Community and Social Life  no deficits         stable and uncomplicated                  low   low low Decision Making/ Complexity Score:  low        Aria Estrella, PT, MHA, [facility]  03/23/2018

## 2018-03-23 ENCOUNTER — CLINICAL SUPPORT (OUTPATIENT)
Dept: REHABILITATION | Facility: HOSPITAL | Age: 63
End: 2018-03-23
Attending: PHYSICIAN ASSISTANT
Payer: COMMERCIAL

## 2018-03-23 DIAGNOSIS — M25.371 RIGHT ANKLE INSTABILITY: ICD-10-CM

## 2018-03-23 PROCEDURE — 97161 PT EVAL LOW COMPLEX 20 MIN: CPT | Mod: PO

## 2018-03-23 PROCEDURE — 97110 THERAPEUTIC EXERCISES: CPT | Mod: PO

## 2018-03-28 ENCOUNTER — CLINICAL SUPPORT (OUTPATIENT)
Dept: REHABILITATION | Facility: HOSPITAL | Age: 63
End: 2018-03-28
Attending: PHYSICIAN ASSISTANT
Payer: COMMERCIAL

## 2018-03-28 ENCOUNTER — OFFICE VISIT (OUTPATIENT)
Dept: OBSTETRICS AND GYNECOLOGY | Facility: CLINIC | Age: 63
End: 2018-03-28
Payer: COMMERCIAL

## 2018-03-28 VITALS
DIASTOLIC BLOOD PRESSURE: 70 MMHG | SYSTOLIC BLOOD PRESSURE: 130 MMHG | WEIGHT: 220.44 LBS | HEIGHT: 67 IN | BODY MASS INDEX: 34.6 KG/M2

## 2018-03-28 DIAGNOSIS — N89.8 VAGINAL DISCHARGE: ICD-10-CM

## 2018-03-28 DIAGNOSIS — Z01.419 ENCOUNTER FOR GYNECOLOGICAL EXAMINATION WITHOUT ABNORMAL FINDING: Primary | ICD-10-CM

## 2018-03-28 DIAGNOSIS — M25.371 RIGHT ANKLE INSTABILITY: ICD-10-CM

## 2018-03-28 DIAGNOSIS — Z12.31 SCREENING MAMMOGRAM, ENCOUNTER FOR: ICD-10-CM

## 2018-03-28 DIAGNOSIS — N95.2 POSTMENOPAUSAL ATROPHIC VAGINITIS: ICD-10-CM

## 2018-03-28 PROCEDURE — 99999 PR PBB SHADOW E&M-EST. PATIENT-LVL III: CPT | Mod: PBBFAC,,, | Performed by: OBSTETRICS & GYNECOLOGY

## 2018-03-28 PROCEDURE — 87480 CANDIDA DNA DIR PROBE: CPT

## 2018-03-28 PROCEDURE — 99386 PREV VISIT NEW AGE 40-64: CPT | Mod: S$GLB,,, | Performed by: OBSTETRICS & GYNECOLOGY

## 2018-03-28 PROCEDURE — 97110 THERAPEUTIC EXERCISES: CPT | Mod: PO

## 2018-03-28 NOTE — PROGRESS NOTES
PHYSICAL THERAPY INITIAL EVALUATION     Date: 03/28/2018  Name: Nano Sosa  Clinic Number: [id number]    Visit #: 2  Start Time:  7:05  Stop Time:  800  Time in treatment: 55 minutes  40 billable  Orders:      [facility] ORTHOPEDICS   Priority Start Date Expiration Date Referral Entered By   Routine 03/21/2018 12/31/2018 Joey Tobar PA-C   Visits Requested Visits Authorized Visits Completed Visits Scheduled   1 20 2 1       Diagnosis   M25.371 (ICD-10-CM) - Instability of right ankle joint   Referral Notes     History   History of Present Illness: Pt relates she rolled her right ankle while simply walking 3 weeks ago .  She has apparently had recurrent ankle sprains on the limb and has xray evidence of old fibular fractures.  She was initially in a cam walker for 1 week after injury but has been wearing shoes and an ankle support since injury    Treatment Diagnosis:   1. Right ankle instability          Past Medical History:   Diagnosis Date    Allergy     General anesthetics causing adverse effect in therapeutic use     wakes up hyperventilating/chills    Goiter     Hyperthyroidism     Hashimoto's    Urinary tract infection        Allergies  Review of patient's allergies indicates:   Allergen Reactions    Penicillins Shortness Of Breath     Throat swelling    Codeine Itching     Hallucinations, sweats    Sulfa (sulfonamide antibiotics) Hives and Swelling    Tessalon [benzonatate] Swelling    Unable to assess      TUNA--Flushing of skin    Erythromycin Rash       Current Medication list:   Current Outpatient Prescriptions on File Prior to Visit   Medication Sig Dispense Refill    b complex vitamins tablet Take 1 tablet by mouth once daily.      cholecalciferol, vitamin D3, 2,000 unit Tab Take 1 tablet by mouth once daily.      diclofenac sodium 1 % Gel Apply 2 g topically 4 (four) times daily. 400 g 0    multivitamin capsule Take 1 capsule by mouth once daily.      nitrofurantoin,  "macrocrystal-monohydrate, (MACROBID) 100 MG capsule Take 1 capsule (100 mg total) by mouth 2 (two) times daily. 14 capsule 0    sodium,potassium,mag sulfates (SUPREP BOWEL PREP KIT) 17.5-3.13-1.6 gram SolR Take 1 kit by mouth as directed. 354 mL 0     No current facility-administered medications on file prior to visit.      Precautions: Standard, Fall    Prior Therapy: no    Diagnostic Tests:   XR ANKLE COMPLETE 3 VIEW RIGHT  FINDINGS:  There is baseline DJD.  No fracture dislocation bone destruction seen.  There is a plantar fascial spur.  There is postoperative change of the 1st digit.       Right Ankle - Pain    .  History today for evaluation treatment of some chronic right ankle pain and instability dating back several years.  Her symptoms have been exacerbated lately secondary to an increase in activity and she's having some concerns with the the pain instability as relates to a trip she's planning overseas seen a few months and she states she has had a multiple injuries to the ankles in past years on but has never been formally treated.  She relates "chronicrotations chronic ankle sprains"      Living situation: active  Stairs in home/work?  yes  Are stairs bothersome?:  Descending is painful in anterior ankle   Work status: desk  Sports/Recreational Activities: na  Assistive devices/equipment ankle support on right    Cultural, Spiritual, Developmental and Educational concerns: none  Abuse/Neglect, Nutritional concerns: none     Patient Therapy Goals: improve strength of the right ankle    Subjective   Nano Sosa states she was really sore in the ankle after last session.    Activities that increase symptoms:   Descending stairs  Pain level with 0 being the lowest and 10 being at onset of treatment:  2  Pain level with 0 being the lowest and 10 being at conclusion of treatment:   0    Objective   62 y.o. White female arrives to clinic in [facility].  She is wearing normal athletic shoes with appropriate " custom inserts.  She has an OTC tie up and velcro ankle support on the Right ankle  POSTURAL examination in standing normal posture with moderate bilateral foot pronation    TREATMENT:  THEREX: 12 minutes of one to one instruction , manual and/or verbal cues for exercises designed to stretch, strengthen and build endurance of the affected body parts     10 reps Ankle plantar and dorsiflexion with maroon TBand provided to patient  10 reps inversion blue  theraband  provided to patient  10 reps eversion blue  theraband   provided to patient  10 reps up on toes  Ankle ABCs  Ankle press 2 x 10 110#  10 reps lateral movement on rocker board  10 reps of AP, Lateral, and circular motion on Bosu board  Bilateral stance on Bosu board    Education   Exercises were reviewed and patient was able to demonstrate them prior to the end of the session.  All of the patients questions were answered  Patient expressed understanding and agreement with above education.      Assessment   Pt with recurrent right ankle sprains  felt decreased 'soreness'  post therapy and suffered no adverse effects with treatment.   .She was able to advance appropriately with therapy today.  Pt presents with  a stable and uncomplicated clinical presentation which includes: hypermobility of feet/LEs, weak mm, pronation of feet, painful extended walking       Goals   8weeks. Pt agrees with goals set.  Independent with HEP.  Report decrease in pain in the right  ankle/foot region  to less than 1  For extended standign and walkign greater than 3 blocks.    Increased MMT in 100% of weak mm that are below 5-/5 grade  by 1/3 grade to enable ambulation with appropriate gait cycle  Improve safety of gait and pt to report no falls curing course of therapy.  Goals beyond 8 weeks will be set at that time based on reassessment      Re-assessment due on or before:  4/21/18    PTA may be involved in patient care as part of the Rehab team.      Plan   Pt will be seen 1-2  times per week for 8 weeks per PPOC  Aria Estrella, PT, MHA, [facility]  03/28/2018

## 2018-03-28 NOTE — LETTER
March 28, 2018      Heydi Cannon, NP  [address]  [facility] [address]           [facility] 5th Floor  [address]  [facility] [address]  Phone: [phone]          Patient: Nano Sosa   MR Number: [id number]   YOB: 1955   Date of Visit: 3/28/2018       Dear Heydi Cannon:    Thank you for referring Nano Sosa to me for evaluation. Attached you will find relevant portions of my assessment and plan of care.    If you have questions, please do not hesitate to call me. I look forward to following Nano Sosa along with you.    Sincerely,    Hilda Linder MD    Enclosure  CC:  No Recipients    If you would like to receive this communication electronically, please contact [email or web address] or [phone] to request more information on [employer] Link access.    For providers and/or their staff who would like to refer a patient to [facility], please contact us through our one-stop-shop provider referral line, [facility] [place], at [phone].    If you feel you have received this communication in error or would no longer like to receive these types of communications, please e-mail [email or web address]

## 2018-03-28 NOTE — PROGRESS NOTES
Subjective:       Patient ID: Nano Sosa is a 62 y.o. female.    Chief Complaint:  Well Woman and Vaginal Discharge (vaginal odor)      History of Present Illness  HPI  Nano Sosa is a 62 y.o. female  NEW TO ME  here for her annual GYN exam.     reports vaginal itching or irritation.  Reports vaginal discharge.  She is not currently sexually active.  History of abnormal pap: No  Last Pap: was normal  Last MMG: normal--routine follow-up in 12 months  Last Colonoscopy:  colonoscopy 2 years ago (Oct 2016) with abnormalities(polyps, advised to return in 2-3 years).  denies domestic violence. She does feel safe at home.     Past Medical History:   Diagnosis Date    Allergy     General anesthetics causing adverse effect in therapeutic use     wakes up hyperventilating/chills    Goiter     Hyperthyroidism     Hashimoto's    Urinary tract infection      Past Surgical History:   Procedure Laterality Date    BUNIONECTOMY      right    colon sx      COLONOSCOPY N/A 2016    Procedure: COLONOSCOPY;  Surgeon: Alli Peña MD;  Location: [address];  Service: Endoscopy;  Laterality: N/A;    f.e.s.s.  2014    FACIAL COSMETIC SURGERY      TOTAL ABDOMINAL [facility]STERECTOMY      [facility]     Social History     Social History    Marital status: Single     Spouse name: N/A    Number of children: N/A    Years of education: N/A     Occupational History    Not on file.     Social History Main Topics    Smoking status: Former Smoker     Packs/day: 1.00     Years: 30.00     Types: Cigarettes     Quit date: 1999    Smokeless tobacco: Never Used    Alcohol use 3.6 oz/week     2 Standard drinks or equivalent, 4 Glasses of wine per week      Comment: weekends    Drug use: No    Sexual activity: Not Currently     Partners: Male     Birth control/ protection: See Surgical Hx     Other Topics Concern    Not on file     Social History Narrative    No narrative on file     Family  "History   Problem Relation Age of Onset    Rheum arthritis Mother     Migraines Mother     Breast cancer Maternal Aunt     Heart disease Maternal Uncle     Hypertension Maternal Uncle     Colon cancer Maternal Aunt     Anesthesia problems Neg Hx     Diabetes Neg Hx     Ovarian cancer Neg Hx      OB History      Para Term  AB Living    2       2      SAB TAB Ectopic Multiple Live Births    2                  /70   Ht 5' 7" (1.702 m)   Wt 100 kg (220 lb 7.4 oz)   BMI 34.53 kg/m²         GYN & OB History    Date of Last Pap: 2013    OB History    Para Term  AB Living   2       2     SAB TAB Ectopic Multiple Live Births   2              # Outcome Date GA Lbr Helder/2nd Weight Sex Delivery Anes PTL Lv   2 SAB            1 SAB                   Review of Systems  Review of Systems   Constitutional: Negative for activity change, appetite change, fatigue and unexpected weight change.   HENT: Negative.    Eyes: Negative for visual disturbance.   Respiratory: Negative for shortness of breath and wheezing.    Cardiovascular: Negative for chest pain, palpitations and leg swelling.   Gastrointestinal: Negative for abdominal pain, bloating and blood in stool.   Endocrine: Positive for hyperthyroidism. Negative for diabetes, hair loss and hot flashes.   Genitourinary: Positive for vaginal discharge and vaginal odor. Negative for decreased libido and dyspareunia.   Musculoskeletal: Negative for back pain and joint swelling.   Skin:  Negative for no acne and hair changes.   Neurological: Negative for headaches.   Hematological: Does not bruise/bleed easily.   Psychiatric/Behavioral: Negative for depression and sleep disturbance. The patient is not nervous/anxious.    Breast: Negative for breast pain and nipple discharge          Objective:    Physical Exam:   Constitutional: She is oriented to person, place, and time. She appears well-developed and well-nourished.    HENT:   Head: " Normocephalic and atraumatic.    Eyes: EOM are normal. Pupils are equal, round, and reactive to light.    Neck: Normal range of motion. Neck supple.    Cardiovascular: Normal rate and regular rhythm.     Pulmonary/Chest: Effort normal and breath sounds normal.   BREASTS: Symmetrical, no skin changes or visible lesions.  No palpable masses, nipple discharge bilaterally.          Abdominal: Soft. Bowel sounds are normal.     Genitourinary: Pelvic exam was performed with patient supine.   Genitourinary Comments: PELVIC: Normal external female genitalia without lesions. Normal hair distribution. Adequate perineal body, normal urethral meatus. Vagina atrophic without lesions or discharge. No significant cystocele or rectocele. Bimanual exam shows uterus and cervix to be surgically absent. Adnexa without masses or tenderness.  RECTAL: Deferred             Musculoskeletal: Normal range of motion and moves all extremeties.       Neurological: She is alert and oriented to person, place, and time.    Skin: Skin is warm and dry.    Psychiatric: She has a normal mood and affect.          Assessment:        1. Encounter for gynecological examination without abnormal finding    2. Vaginal discharge    3. Screening mammogram, encounter for                Plan:          1. Encounter for gynecological examination without abnormal finding  COUNSELING:  The patient was counseled today on osteoporosis prevention, calcium supplementation, and regular weight bearing exercise. The patient was also counseled today on ACS PAP guidelines, with recommendations for yearly pelvic exams unless their uterus, cervix, and ovaries were removed for benign reasons; in that case, examinations every 3-5 years are recommended. The patient was also counseled regarding monthly breast self-examination, routine STD screening for at-risk populations, prophylactic immunizations for transmitted infections such as HPV, Pertussis, or Influenza as appropriate, and  yearly mammograms when indicated by ACS guidelines. She was advised to see her primary care physician for all other health maintenance.   FOLLOW-UP with me for next routine visit.       2. Vaginal discharge    - Vaginosis Screen by DNA Probe    3. Screening mammogram, encounter for    - Mammo Digital Screening Bilat with Tomosynthesis CAD; Future    4. Postmenopausal atrophic vaginitis    - conjugated estrogens (PREMARIN) vaginal cream; Place 0.5 g vaginally twice a week. Insert into vagina as directed  Dispense: 30 g; Refill: 6       Follow-up in about 1 year (around 3/28/2019).

## 2018-03-29 LAB
CANDIDA RRNA VAG QL PROBE: NEGATIVE
G VAGINALIS RRNA GENITAL QL PROBE: NEGATIVE
T VAGINALIS RRNA GENITAL QL PROBE: NEGATIVE

## 2018-04-17 ENCOUNTER — PATIENT MESSAGE (OUTPATIENT)
Dept: INTERNAL MEDICINE | Facility: CLINIC | Age: 63
End: 2018-04-17

## 2018-04-17 DIAGNOSIS — N39.0 URINARY TRACT INFECTION WITHOUT HEMATURIA, SITE UNSPECIFIED: Primary | ICD-10-CM

## 2018-04-17 RX ORDER — NITROFURANTOIN 25; 75 MG/1; MG/1
100 CAPSULE ORAL 2 TIMES DAILY
Qty: 14 CAPSULE | Refills: 0 | Status: SHIPPED | OUTPATIENT
Start: 2018-04-17 | End: 2018-05-07

## 2018-04-19 ENCOUNTER — TELEPHONE (OUTPATIENT)
Dept: REHABILITATION | Facility: HOSPITAL | Age: 63
End: 2018-04-19

## 2018-04-19 NOTE — TELEPHONE ENCOUNTER
Spoke with patient re: no show PT visits. Patient explains that she though she had cancelled the appts on my [facility] due to feeling better.

## 2018-05-07 ENCOUNTER — OFFICE VISIT (OUTPATIENT)
Dept: INTERNAL MEDICINE | Facility: CLINIC | Age: 63
End: 2018-05-07
Payer: COMMERCIAL

## 2018-05-07 VITALS
HEIGHT: 67 IN | SYSTOLIC BLOOD PRESSURE: 124 MMHG | BODY MASS INDEX: 35.26 KG/M2 | HEART RATE: 74 BPM | WEIGHT: 224.63 LBS | DIASTOLIC BLOOD PRESSURE: 72 MMHG

## 2018-05-07 DIAGNOSIS — M85.80 OSTEOPENIA, UNSPECIFIED LOCATION: ICD-10-CM

## 2018-05-07 DIAGNOSIS — K51.919 ULCERATIVE COLITIS WITH COMPLICATION, UNSPECIFIED LOCATION: ICD-10-CM

## 2018-05-07 DIAGNOSIS — Z86.39 HISTORY OF HASHIMOTO THYROIDITIS: ICD-10-CM

## 2018-05-07 DIAGNOSIS — E55.9 MILD VITAMIN D DEFICIENCY: ICD-10-CM

## 2018-05-07 DIAGNOSIS — M25.371 RIGHT ANKLE INSTABILITY: ICD-10-CM

## 2018-05-07 DIAGNOSIS — Z86.010 HISTORY OF COLON POLYPS: ICD-10-CM

## 2018-05-07 DIAGNOSIS — R63.5 WEIGHT GAIN: ICD-10-CM

## 2018-05-07 DIAGNOSIS — R91.1 LUNG NODULE: ICD-10-CM

## 2018-05-07 DIAGNOSIS — R91.8 PULMONARY NODULES: ICD-10-CM

## 2018-05-07 DIAGNOSIS — R53.83 FATIGUE, UNSPECIFIED TYPE: ICD-10-CM

## 2018-05-07 DIAGNOSIS — K62.89 RECTAL MASS: ICD-10-CM

## 2018-05-07 DIAGNOSIS — E04.1 THYROID NODULE: Primary | ICD-10-CM

## 2018-05-07 PROCEDURE — 99214 OFFICE O/P EST MOD 30 MIN: CPT | Mod: PBBFAC | Performed by: INTERNAL MEDICINE

## 2018-05-07 PROCEDURE — 99214 OFFICE O/P EST MOD 30 MIN: CPT | Mod: S$GLB,,, | Performed by: INTERNAL MEDICINE

## 2018-05-07 PROCEDURE — 99999 PR PBB SHADOW E&M-EST. PATIENT-LVL IV: CPT | Mod: PBBFAC,,, | Performed by: INTERNAL MEDICINE

## 2018-05-07 NOTE — Clinical Note
Joey,  This is a patient you are seeing for right ankle injury.  She has a bunion on her left foot she wants removed.  Is this something you do in Ortho or does she need to see Podiatry.  If she needs to see Podiatry, do you have a recommendation on who she sees?  Thanks so much, Eva Shah

## 2018-05-07 NOTE — PROGRESS NOTES
Internal Medicine    Subjective:      Patient ID: Nano Sosa is a 63 y.o. female.    Chief Complaint: Establish Care    HPI:  Patient presents to establish care.  Previously followed by Dr. Rodriguez.  Last seen in clinic 3/14/2018 by Heydi Cannon NP for Annual Exam.  Last seen by Dr. Rodriguez in 2015.    Fatigue, weight gain:  Has been present 4-5 months.  Normal thyroid function.  Not working out currently due to right ankle injury.      Right ankle injury:  Saw KAROL Moreno on 3/21/18.  Doing PT and using wrap.  Has gotten better.      Left foot bunion:  Causing pain.  Interested in removal.    Hashimoto's thyroiditis, h/o thyroid nodule:  Diagnosed in 2013.  Previously followed by Dr. Hardin.  Normal thyroid function on 3/19/18.  Positive thyroid peroxidase antibody.  Has never been on thyroid replacement.  Last thyroid ultrasound and fine needle aspiration 3/2016.  Showed atypia vs FLUS.  Options were surgery or molecular study - molecular study showed high likelhood of benign lesion estimated at 94%    Ulcerative colitis:  Asymptomatic NILA since 2004.  Went to holistic doctor who started series of supplements (fish oil, colostrum, etc).  Had reoccurring symptom in 2016, went back on supplements and resolved.  Never been on medication.      History of rectal mass in 2014, rectal polyp in 2016:  S/p trans anal excision 12/24/15.  Last colonoscopy 12/19/16 - due for repeat in 2 years.  Followed by Dr. Peña with Colorectal Surgery.    Pulmonary nodules:  Last CT chest 12/29/15.  Last saw Pulmonary 2/3/16.  Wanted repeat CT in 6 months and follow up in 6 months.  Has 30 pack year history of smoking, quit in 1999.    Vitamin D deficiency, osteopenia:  Level 27 on 3/19/18.  Taking vitamin D 2000 units daily.      Osteopenia:  Last bone density scan 12/24/15.  Taking calcium and vitamin D.    HLD:  Elevated total cholesterol of 215:  The 10-year ASCVD risk score (Bipin EDWARDO Jr., et al., 2013) is:  5%.  Has never been on medication.              Occasional joint pains:  Positive autoimmune family history.  Usually has pain in joints of hands, however this always resolves when she avoids gluten.  Not bothering her currently.       Menopause, hysterectomy 2/2 uterine fibroids.  Seen by ObGyn and started on vaginal estrogen cream.    History of migraines:  Went away with yoga.  If she feels one coming on (only about once a month), takes 3 extra strength Execrin, puts legs up, mask on face, and it goes away.       Also taking B complex vitamin.        Review of Systems   Constitutional: Positive for activity change (Not exercising), fatigue and unexpected weight change (Gain). Negative for appetite change, chills and fever.   HENT: Negative for hearing loss, rhinorrhea, sore throat and trouble swallowing.    Eyes: Negative for discharge and visual disturbance.   Respiratory: Negative for cough, chest tightness, shortness of breath and wheezing.    Cardiovascular: Negative for chest pain, palpitations and leg swelling.   Gastrointestinal: Negative for abdominal pain, blood in stool, constipation, diarrhea, nausea and vomiting.   Endocrine: Negative for polydipsia and polyuria.   Genitourinary: Negative for difficulty urinating, dysuria, hematuria and menstrual problem.   Musculoskeletal: Positive for joint swelling (Not currently). Negative for arthralgias, myalgias and neck pain.   Skin: Negative for rash and wound.   Neurological: Negative for dizziness, weakness, numbness and headaches.   Psychiatric/Behavioral: Negative for behavioral problems, confusion and dysphoric mood.       Past Medical History:   Diagnosis Date    General anesthetics causing adverse effect in therapeutic use     wakes up hyperventilating/chills    Goiter     Hashimoto's disease     Migraine     Osteopenia     Rectal mass     Ulcerative colitis     Urinary tract infection     Vitamin D deficiency      Past Surgical History:  "  Procedure Laterality Date    BUNIONECTOMY      right    colon sx      COLONOSCOPY N/A 12/19/2016    Procedure: COLONOSCOPY;  Surgeon: Alli Peña MD;  Location: [facility] ([address] [place] [address]);  Service: Endoscopy;  Laterality: N/A;    f.e.s.s.  12/5/2014    FACIAL COSMETIC SURGERY      TOTAL ABDOMINAL HYSTERECTOMY  1994    [facility]     Family History   Problem Relation Age of Onset    Rheum arthritis Mother     Migraines Mother     Breast cancer Maternal Aunt     Heart disease Maternal Uncle     Hypertension Maternal Uncle     Colon cancer Maternal Aunt     Anesthesia problems Neg Hx     Diabetes Neg Hx     Ovarian cancer Neg Hx      Social History   Substance Use Topics    Smoking status: Former Smoker     Packs/day: 1.00     Years: 30.00     Types: Cigarettes     Quit date: 8/21/1999    Smokeless tobacco: Never Used    Alcohol use 3.6 oz/week     4 Glasses of wine, 2 Standard drinks or equivalent per week      Comment: weekends       Medications and allergies reviewed.     Objective:     Vitals:    05/07/18 0812   BP: 124/72   Pulse: 74   Weight: 101.9 kg (224 lb 10.4 oz)   Height: 5' 7" (1.702 m)     Physical Exam   Constitutional: She is oriented to person, place, and time. She appears well-developed and well-nourished. No distress.   HENT:   Head: Normocephalic and atraumatic.   Eyes: Conjunctivae and EOM are normal. No scleral icterus.   Neck: No thyromegaly present.   Cardiovascular: Normal rate and regular rhythm.  Exam reveals no gallop and no friction rub.    No murmur heard.  Pulmonary/Chest: Effort normal and breath sounds normal. No respiratory distress. She has no wheezes. She has no rales.   Abdominal: Soft. Bowel sounds are normal. She exhibits no distension and no mass. There is no tenderness. There is no rebound and no guarding.   Musculoskeletal: She exhibits no edema or tenderness.   Mild right ankle swelling and tenderness, no warmth or erythema.     Lymphadenopathy:     She has no " cervical adenopathy.   Neurological: She is alert and oriented to person, place, and time.   Skin: No rash noted. No erythema.   Psychiatric: She has a normal mood and affect. Her behavior is normal.       Assessment:     1. Thyroid nodule    2. History of Hashimoto thyroiditis    3. Osteopenia, unspecified location    4. Mild vitamin D deficiency    5. History of colon polyps    6. Rectal mass    7. Ulcerative colitis with complication, unspecified location    8. Right ankle instability    9. Weight gain    10. Fatigue, unspecified type    11. Pulmonary nodules    12. Lung nodule        Plan:     *Continue medication/plan as discussed in HPI except for changes discussed below.    Nano was seen today for establish care.    Diagnoses and all orders for this visit:    Thyroid nodule  -     US Soft Tissue Head Neck Thyroid; Future; Expected date: 05/07/2018    History of Hashimoto thyroiditis  -     US Soft Tissue Head Neck Thyroid; Future; Expected date: 05/07/2018    Osteopenia, unspecified location  -     DXA Bone Density Spine And Hip; Future; Expected date: 05/07/2018    Mild vitamin D deficiency    Rectal mass  History of colon polyps  -     Case request GI: COLONOSCOPY    Ulcerative colitis with complication, unspecified location    Right ankle instability   Followed by Ortho.    Weight gain  Fatigue, unspecified type   Referral to Health .    Pulmonary nodules  -     CT Chest Without Contrast; Future; Expected date: 05/07/2018    Health maintenance reviewed with patient.     Follow-up in about 1 year (around 5/7/2019).    Eva Shah MD  Internal Medicine  [facility]

## 2018-05-11 ENCOUNTER — TELEPHONE (OUTPATIENT)
Dept: INTERNAL MEDICINE | Facility: CLINIC | Age: 63
End: 2018-05-11

## 2018-05-11 DIAGNOSIS — M21.619 BUNION: Primary | ICD-10-CM

## 2018-05-11 NOTE — TELEPHONE ENCOUNTER
----- Message from Joey Tobar PA-C sent at 5/8/2018  4:39 PM CDT -----  Regarding: RE: Daron  She can have her Bunion surgery either at podiatry or here in ortho, but we only have 1 lisa Dr. Ballard and he is booked way out. Podiatry could probably get her done much quicker. Dr. Blevins is good  ----- Message -----  From: Eva Shah MD  Sent: 5/8/2018   4:27 PM  To: Joey Tobar PA-C  Subject: Daron Cook,    This is a patient you are seeing for right ankle injury.  She has a bunion on her left foot she wants removed.  Is this something you do in Ortho or does she need to see Podiatry.  If she needs to see Podiatry, do you have a recommendation on who she sees?    Thanks so much,  Eva Shah

## 2018-05-11 NOTE — TELEPHONE ENCOUNTER
Please call patient and let her know that I spoke with Joey Tobar.  He said the 2 options for bunion surgery are Dr. Ballard with Ortho (however he is booked out pretty far) or a Podiatrist.  Please see if she prefers who she sees.

## 2018-05-11 NOTE — TELEPHONE ENCOUNTER
I have placed Ortho referral for Dr. Ballard and Podiatry for Dr. Da Blevins (Joey Tobar recommends).  Please help her schedule.

## 2018-05-11 NOTE — TELEPHONE ENCOUNTER
Spoke with pt and she understood she would like for you to place both referrals she would like to see whats the first date that comes up for  if its to far out then she will see podiatry

## 2018-05-17 ENCOUNTER — TELEPHONE (OUTPATIENT)
Dept: INTERNAL MEDICINE | Facility: CLINIC | Age: 63
End: 2018-05-17

## 2018-05-21 ENCOUNTER — PATIENT MESSAGE (OUTPATIENT)
Dept: INTERNAL MEDICINE | Facility: CLINIC | Age: 63
End: 2018-05-21

## 2018-05-21 ENCOUNTER — OFFICE VISIT (OUTPATIENT)
Dept: INTERNAL MEDICINE | Facility: CLINIC | Age: 63
End: 2018-05-21
Payer: COMMERCIAL

## 2018-05-21 ENCOUNTER — HOSPITAL ENCOUNTER (OUTPATIENT)
Dept: RADIOLOGY | Facility: HOSPITAL | Age: 63
Discharge: HOME | End: 2018-05-21
Attending: NURSE PRACTITIONER
Payer: COMMERCIAL

## 2018-05-21 VITALS
OXYGEN SATURATION: 98 % | BODY MASS INDEX: 35.36 KG/M2 | HEART RATE: 67 BPM | SYSTOLIC BLOOD PRESSURE: 120 MMHG | DIASTOLIC BLOOD PRESSURE: 80 MMHG | WEIGHT: 225.31 LBS | HEIGHT: 67 IN

## 2018-05-21 DIAGNOSIS — R07.81 RIB PAIN ON LEFT SIDE: ICD-10-CM

## 2018-05-21 DIAGNOSIS — R07.1 PAIN AGGRAVATED BY BREATHING: Primary | ICD-10-CM

## 2018-05-21 DIAGNOSIS — R07.1 PAIN AGGRAVATED BY BREATHING: ICD-10-CM

## 2018-05-21 PROCEDURE — 71100 X-RAY EXAM RIBS UNI 2 VIEWS: CPT | Mod: 26,,, | Performed by: RADIOLOGY

## 2018-05-21 PROCEDURE — 99213 OFFICE O/P EST LOW 20 MIN: CPT | Mod: S$GLB,,, | Performed by: NURSE PRACTITIONER

## 2018-05-21 PROCEDURE — 71046 X-RAY EXAM CHEST 2 VIEWS: CPT | Mod: 26,,, | Performed by: RADIOLOGY

## 2018-05-21 PROCEDURE — 99214 OFFICE O/P EST MOD 30 MIN: CPT | Mod: PBBFAC,25 | Performed by: NURSE PRACTITIONER

## 2018-05-21 PROCEDURE — 99999 PR PBB SHADOW E&M-EST. PATIENT-LVL IV: CPT | Mod: PBBFAC,,, | Performed by: NURSE PRACTITIONER

## 2018-05-21 PROCEDURE — 71046 X-RAY EXAM CHEST 2 VIEWS: CPT | Mod: TC

## 2018-05-21 PROCEDURE — 71100 X-RAY EXAM RIBS UNI 2 VIEWS: CPT | Mod: TC

## 2018-05-21 NOTE — PATIENT INSTRUCTIONS
Nano was seen today for chest injury.    Diagnoses and all orders for this visit:    Pain aggravated by breathing  -     X-Ray Ribs 2 View Left; Future  -     X-Ray Chest PA And Lateral; Future    Rib pain on left side  -     X-Ray Ribs 2 View Left; Future  -     X-Ray Chest PA And Lateral; Future    Will get CXR to rule out pneumonia due to pain on breathing and coughing reported by pt. Will call or message pt in portal with results.    Advised and reassured pt this is not likely broken ribs but will obtain left rib series per pt request for reassurance. Will call with results. Advised that tx for rib pain/costochondritis is rest, ice and alternate with heat, anti-inflammatories. Declined meloxicam script, would like to see results of xrays first. Advised she can take ibuprofen or tylenol otc prn pain. Avoid heavy lifting or strenuous activity.    Keep appts on 6/6/18 for CT chest and DEXA per PCP Dr. Shah      Pt decline Td and Pneumovax today

## 2018-05-21 NOTE — PROGRESS NOTES
Subjective:       Patient ID: Nano Sosa is a 63 y.o. female.    Chief Complaint: Chest Injury (poss broken rib, muscles pull)    Pt presents with chief complaint as listed above.    Pt states about a week ago she was pushing up against a heavy door at work and she felt a pain from the pressure of pushing up against the door as if she jammed herself. Since then she has had left ribcage pain. She states it started like just muscle soreness a week ago, achy in nature like an overpulled muscle. Yesterday and this morning the pain has worsened, when she breathes deeply it is a 10/10 on prs. Pain worsens when coughing or breathing deeply. Tried Biofreeze and Diclofenac gel she uses for her feet without any relief. Very uncomfortable when she lays down also. Denies any real radiation of the pain. Denies any sever trauma or injury other than mentioned here.    Denies any CP, sweating, or palpitations. Denies any numbness or tingling. Denies any fever or chills. Denies any cardiac hx.    There is a hx of pulmonary nodules. Followed by PCP Dr. Shah. She has a CT without contrast of chest on 6/6/18 per Dr. Shah. She also has a hx of osteopenia for which she takes vitamin D and Calcium. She has a DEXA scheduled also per Dr. Shah on 6/6/18.    I have reviewed her PMHx, Social HX, vital signs and med list.    Review of patient's allergies indicates:   -- Penicillins -- Shortness Of Breath    --  Throat swelling   -- Codeine -- Itching    --  Hallucinations, sweats   -- Erythromycin -- Itching and Swelling   -- Tessalon (benzonatate) -- Swelling   -- Unable to assess     --  TUNA--Flushing of skin   -- Sulfa (sulfonamide antibiotics) -- Hives and Rash    Current Outpatient Prescriptions on File Prior to Visit:  b complex vitamins tablet, Take 1 tablet by mouth once daily., Disp: , Rfl:   cholecalciferol, vitamin D3, 2,000 unit Tab, Take 1 tablet by mouth once daily., Disp: , Rfl:   conjugated estrogens (PREMARIN)  vaginal cream, Place 0.5 g vaginally twice a week. Insert into vagina as directed, Disp: 30 g, Rfl: 6  diclofenac sodium 1 % Gel, Apply 2 g topically 4 (four) times daily., Disp: 400 g, Rfl: 0  multivitamin capsule, Take 1 capsule by mouth once daily., Disp: , Rfl:   sodium,potassium,mag sulfates (SUPREP BOWEL PREP KIT) 17.5-3.13-1.6 gram SolR, Take 1 kit by mouth as directed., Disp: 354 mL, Rfl: 0    No current facility-administered medications on file prior to visit.     Past Medical History:  No date: General anesthetics causing adverse effect in *      Comment: wakes up hyperventilating/chills  No date: Goiter  No date: Hashimoto's disease  No date: Migraine  No date: Osteopenia  No date: Rectal mass  No date: Ulcerative colitis  No date: Urinary tract infection  No date: Vitamin D deficiency    Past Surgical History:  No date: BUNIONECTOMY      Comment: right  No date: colon sx  12/19/2016: COLONOSCOPY N/A      Comment: Procedure: COLONOSCOPY;  Surgeon: Alli Peña MD;  Location: [address];                 Service: Endoscopy;  Laterality: N/A;  12/5/2014: f.e.s.s.  No date: FACIAL COSMETIC SURGERY  1994: TOTAL ABDOMINAL HYSTERECTOMY      Comment: [facility]    Social History    Marital status: Single              Spouse name:                       Years of education:                 Number of children:               Occupational History    None on file    Social History Main Topics    Smoking status: Former Smoker                                                                Packs/day: 1.00      Years: 30.00          Types: Cigarettes       Quit date: 8/21/1999    Smokeless tobacco: Never Used                        Alcohol use: Yes           3.6 oz/week       Glasses of wine: 4, Standard drinks or equivalent: 2 per week       Comment: weekends    Drug use: No              Sexual activity: Not Currently     Partners with: Male       Birth control/protection: See Surgical Hx    Other  "Topics            Concern    None on file    Social History Narrative    None on file              Review of Systems   Constitutional: Negative for activity change, chills, diaphoresis, fatigue and fever.   Eyes: Negative for visual disturbance.   Respiratory: Negative for apnea, cough, chest tightness and shortness of breath.         Pain when breathing deeply, left upper ribs   Cardiovascular: Negative for chest pain and leg swelling.   Gastrointestinal: Negative for abdominal distention, abdominal pain, blood in stool, constipation, diarrhea, nausea and vomiting.   Genitourinary: Negative for difficulty urinating.   Musculoskeletal: Positive for arthralgias.        Rib pain left side   Skin: Negative for color change.   Allergic/Immunologic: Negative for environmental allergies, food allergies and immunocompromised state.   Neurological: Negative for dizziness, speech difficulty and weakness.   Hematological: Does not bruise/bleed easily.   Psychiatric/Behavioral: Negative for agitation, behavioral problems, sleep disturbance and suicidal ideas.       Objective:       Vitals:    05/21/18 1522 05/21/18 1548   BP: (!) 140/84 120/80   BP Location: Right arm Right arm   Patient Position: Sitting Sitting   BP Method: Large (Manual) Large (Manual)   Pulse: 67    SpO2: 98%    Weight: 102.2 kg (225 lb 5 oz)    Height: 5' 7" (1.702 m)      Physical Exam   Constitutional: She is oriented to person, place, and time. She appears well-developed and well-nourished.   Neck: Normal range of motion. Neck supple.   Cardiovascular: Normal rate, regular rhythm, normal heart sounds and intact distal pulses.  Exam reveals no gallop and no friction rub.    No murmur heard.  Pulmonary/Chest: Effort normal and breath sounds normal. She exhibits tenderness.   Left upper chest/rib pain on palpation and when taking deep breaths on exam   Abdominal: Soft. Bowel sounds are normal. She exhibits no distension and no mass. There is no " tenderness. There is no rebound and no guarding. No hernia.   Musculoskeletal: Normal range of motion. She exhibits tenderness.   Neurological: She is alert and oriented to person, place, and time.   Skin: Skin is warm and dry. Capillary refill takes less than 2 seconds. No rash noted. No erythema. No pallor.   Psychiatric: She has a normal mood and affect. Her behavior is normal. Judgment and thought content normal.       Assessment:       Nano was seen today for chest injury.    Diagnoses and all orders for this visit:    Pain aggravated by breathing  -     X-Ray Ribs 2 View Left; Future  -     X-Ray Chest PA And Lateral; Future    Rib pain on left side  -     X-Ray Ribs 2 View Left; Future  -     X-Ray Chest PA And Lateral; Future    Will get CXR to rule out pneumonia due to pain on breathing and coughing reported by pt. Will call or message pt in portal with results.    Advised and reassured pt this is not likely broken ribs but will obtain left rib series per pt request for reassurance. Will call with results. Advised that tx for rib pain/costochondritis is rest, ice and alternate with heat, anti-inflammatories. Declined meloxicam script, would like to see results of xrays first. Advised she can take ibuprofen or tylenol otc prn pain. Avoid heavy lifting or strenuous activity.    Keep appts on 6/6/18 for CT chest and DEXA per PCP Dr. Shah      Pt decline Td and Pneumovax today

## 2018-05-21 NOTE — TELEPHONE ENCOUNTER
Phone encounter:    Spoke to patient on the phone regarding our current email convo. I advised her to not carry her work back pack on that side for now. She is willing to try heat and tylenol. Advised if not better by Wednesday or if s/s worsen to email and call me, my cell phone was provided to pt. I also advised if any chest pain occurs, numbness or tingling of arm, SOB and other cardiac s/s occur go to nearest ER ASAP. Pt verbalized understanding and she will contact me if it does not improve.

## 2018-05-22 ENCOUNTER — PATIENT MESSAGE (OUTPATIENT)
Dept: INTERNAL MEDICINE | Facility: CLINIC | Age: 63
End: 2018-05-22

## 2018-05-23 ENCOUNTER — DOCUMENTATION ONLY (OUTPATIENT)
Dept: REHABILITATION | Facility: HOSPITAL | Age: 63
End: 2018-05-23

## 2018-05-29 ENCOUNTER — CLINICAL SUPPORT (OUTPATIENT)
Dept: INTERNAL MEDICINE | Facility: CLINIC | Age: 63
End: 2018-05-29
Payer: COMMERCIAL

## 2018-05-29 VITALS — BODY MASS INDEX: 35.01 KG/M2 | WEIGHT: 223.56 LBS

## 2018-05-29 NOTE — PROGRESS NOTES
Date:   5/29/18                   Time: 730  Initial Health  Contact - [x]Clinic visit  []  Phone Visit  ASSEMENT: Information obtained through combination of chart review prior to seeing patient, patient self-report.   Primary Referral diagnosis/reason for referral:     weight loss      (See physician progress note for complete list of diagnoses.)  PCP:   Dr. Shah     Referent :  [x] PCP       [] Transition Navigator    []  E.DLobo    []  APRN  []  Other:     Supports:   Friends       Preferred Learning Style  (may be a combination)  []  Visual (pictures/ video)     [] Auditory/ Listening   []  Reading    []  Hands-on/ Demonstration   []  1:1    []  Group        []  Alone       []  No preference   [x]  Combination  []  Other:         Presenting issues from patients point of view:  [x]  Improve nutrition/increase healthy choices.                    []  Improve /maintain current functioning.  []  Obtain and maintain healthy blood pressure.                  []  Stop smoking.  [x]  Improve weight management.                                       []  Lower cholesterol.  []  Improve blood glucose management.                            []  Improve breathing.  [x]  Increase energy level.                                                      []  Increase/maintain independence.   []  Improve sleep.                                                                []  Decrease stress.  []  Improve pain management.                                             []  Improve mood.  []  Other:                  Pt. Education Level:      BA    Disease specific education services attended: no        Patient Employed:  [x]yes    [] No  Where __[employer]___  Days and Hours:      M-F 330-530 am 8a-5p  Very Flexible          Current Self-help Behaviors  []  Checks glucose level        times a day.  []  Tries to modify meals so more healthy.  [x]  Exercises by   Walking /bike 5-6 x week 45-60 min      []  Takes  BP        times a       (insert frequency)  []  Weighs self         (how often)  []  Takes all medications as prescribed.  [x]  Rarely misses health care appointments.  [x]  Sleeps 8 hours without waking more than twice.  [x]  Consistently wears/uses functioning aids as recommended  (ie:  Contacts [] , glasses [x] , hearing  Aid [] , prosthesis [] ,  Walker [] ,  Wheelchair []  etc.)  [x]  Regularly engages in stress management activities I.e.:  [x]  Quit smoking 1993 1 pk p/d  [x]  Purposefully educates self about health issues.  []  Pt did bring glucose log with him/her.  []  Other  (explain)           []  Comments:       []  Reported Blood Sugar Readings:          Health screenings   Last PCP visit:   5/7/18   GYN/Pap:    3/28/18 exam 1/29/13 pap                         MMG:  3/8/18                                      DEXA: 12/17/15                        Colon: 12/19/16   Lipids: 3/19/18   CMP: 3/19/18   HgA1C: 12/18/15 (5.3)             Eye Exam:   "[employer] 2017   Foot Exam:    Going to see ortho for Bunions     Vitamin D 3/19/18 (27)    Strengths:  [x]  Confident                       [x]  Determined/persistent           []  Enthusiastic         [x]  Good problem solving           []  Good self-control                   []  Hard working        [x]  Hopeful/optimistic                  [x]  Intelligent                                [x]  Self aware  [x]  Creative                                 []  Flexible          [x]  Sense of humor                     [x]  Open/willing          []  Spiritual                                  [x] Values health    []  Successful overcoming difficult challenges in the past.     [x]  Pos support network- Friends  [x]   Health literate (The degree to which individuals have the capacity to obtain, process, and    understand basic health information and services needed to make appropriate health decisions.- Dept. of Health and Human services.)  []   Other Comments:              Change Markers  Scale 0-10 with 10 representing most Ready 0 least ready, 10 most important 0 least important 10 most confident 0 least confident.     [] NA at this time.   Readiness: 10    ;  Importance:  10   ;  Confidence:  8      INTERVENTIONS:  [x] Given an explanation about health coaching program and invited participation.  [x] Listened reflectively; provided support, encouragement, and validation; respected  and maintained patient self-direction; explored pros/cons of change; personalized health risks of maintaining the status quo; and facilitated recognition of discrepancy between current behaviors and patients goals and values.  [x] Assessed stage of change and employed appropriate motivational interviewing strategies to facilitate movement toward the next stage of change and healthy behavior modification.  [x] Normalized occurrence of relapse, helped patient recognize outcome of new self-learning as a result of the relapse such as triggers, and helped focus on factors to reduce chances of returning to previous behaviors .  [x] Used readiness scale ratings to guide assessment of importance and confidence of successfully reaching goals.  [x] Assisted patient to develop goal, action plan, and address potential barriers to goal     achievement.  [] Patient was given a new (insert glucose meter or other supplies), taught to use, and      successfully demonstrated ability to carry out essential steps of process.    [] Rx for ( monitor/supplies, pen needles, etc.) was obtained.  [x] Provided educational review, written materials/handouts (Meal Planning Counting Carbs, Healthy Plate, 10 Rules for Eating Safely for Life, 10 Tips to Cutting Your Cravings, Probiotics).   [x] Topics discussed/provided:    GI of foods and how it affects your metabolism and helps you burn fat, carb counting    [x] Facilitated completion of needed exams and screenings by reviewing Diabetic/Health Maintenance Report Card with  patient.  [x] Provided pt with my business card.  [x] Informed of/reviewed how to access health  and after hours assistance.  [x] Discussed, planned, and scheduled future contacts.  [x]Challenges/Barriers identified discussed as identified by patient.  [x] Needs identified by this Health :    Education and support     [] Other:            For additional care management support patient was referred to:        []  Community resources for                        []  Medication assistance programs               []  Dietician              []  Diabetes  [employer]                                        []  Mental health services                           []                  []  Diabetes [place]                                              []  Home health services                                []  Complex case management              []  PCP/covering provider due to                 []  Emergency Dept.                                  []  GYN              []  Optometrist/ Ophthalmologist                          []  Podiatrist                                                      [x]  N/A        []  Other:           RESPONSE/IMPRESSION  Behavior is consistent with the following stage of change:  []  Pre-contemplation  []  Contemplation  [x]  Preparation  []  Action  []  Maintenance  []  Relapse    Level of participation:  []  Refused to participate  []  Guarded / resistant  []  Passive participant  [x]  Active participant/interactive  [x]  Interested/receptive  [x]  Self-motivated  []  Other          Goal developed by patient today: Start today   Lose weight 50 lbs 10/10 (4-8 lbs per month)  Keep off 10/5  Reduction of carbs 10/10  Reduction sugars < 15 gms day 10/10  Keep low glycemic foods 10/10    Plan (needed actions to accomplish goal):          [x] Pt will work on action plan as stated above.        [x] Pt will follow up with Health  on    6/15/18 at 0730.    [x] Health  will remain  available.  [x] Health  will maintain regular contacts with patient to educate, support, encourage; help problem-solve; assist with development of self-advocacy/increasing independent health management; and provide resources as needed.  [] Pt has declined Health  Program at this time. Provided pt with Health  Program information should they decide to participate at a later time.        [] Pt to facilitate contact with Health        [] Health  to facilitate contact with patient.        [] Other:          Notes:   Met with patient she is interested in weight loss.  She has lost weight down to 175 lbs yr before last and then gained it back.   She has done lots of diets (whole 30, bulletproof and weight watchers).  Her best reasons are for vanity (doesn't think looks good), health wise prevent disease and be able to move next 20 yrs.   Goals set by patient and will continue to work with patient to assist to reach her goals.     Best Method of Contact:  [x] email:   [x] Phone: or text [phone]  [] Mail  [] Office     Ami Kraus RN

## 2018-06-04 ENCOUNTER — OFFICE VISIT (OUTPATIENT)
Dept: ORTHOPEDICS | Facility: CLINIC | Age: 63
End: 2018-06-04
Payer: COMMERCIAL

## 2018-06-04 ENCOUNTER — HOSPITAL ENCOUNTER (OUTPATIENT)
Dept: RADIOLOGY | Facility: HOSPITAL | Age: 63
Discharge: HOME | End: 2018-06-04
Attending: ORTHOPAEDIC SURGERY
Payer: COMMERCIAL

## 2018-06-04 VITALS
DIASTOLIC BLOOD PRESSURE: 82 MMHG | HEIGHT: 67 IN | HEART RATE: 65 BPM | BODY MASS INDEX: 34.59 KG/M2 | SYSTOLIC BLOOD PRESSURE: 117 MMHG | WEIGHT: 220.38 LBS

## 2018-06-04 DIAGNOSIS — M79.671 BILATERAL FOOT PAIN: ICD-10-CM

## 2018-06-04 DIAGNOSIS — M79.671 BILATERAL FOOT PAIN: Primary | ICD-10-CM

## 2018-06-04 DIAGNOSIS — M79.672 BILATERAL FOOT PAIN: Primary | ICD-10-CM

## 2018-06-04 DIAGNOSIS — M20.12 HALLUX VALGUS, LEFT: ICD-10-CM

## 2018-06-04 DIAGNOSIS — M21.622 BUNIONETTE OF LEFT FOOT: ICD-10-CM

## 2018-06-04 DIAGNOSIS — M79.672 BILATERAL FOOT PAIN: ICD-10-CM

## 2018-06-04 DIAGNOSIS — T84.84XA PAINFUL ORTHOPAEDIC HARDWARE: ICD-10-CM

## 2018-06-04 PROCEDURE — 73630 X-RAY EXAM OF FOOT: CPT | Mod: 50,TC

## 2018-06-04 PROCEDURE — 3008F BODY MASS INDEX DOCD: CPT | Mod: CPTII,S$GLB,, | Performed by: ORTHOPAEDIC SURGERY

## 2018-06-04 PROCEDURE — 99999 PR PBB SHADOW E&M-EST. PATIENT-LVL III: CPT | Mod: PBBFAC,,, | Performed by: ORTHOPAEDIC SURGERY

## 2018-06-04 PROCEDURE — 99214 OFFICE O/P EST MOD 30 MIN: CPT | Mod: S$GLB,,, | Performed by: ORTHOPAEDIC SURGERY

## 2018-06-04 PROCEDURE — 73630 X-RAY EXAM OF FOOT: CPT | Mod: 26,50,, | Performed by: RADIOLOGY

## 2018-06-04 NOTE — LETTER
June 4, 2018      Eva Shah MD  [address] [address]           [facility]  [address], [address]  [place] [facility] [address]  Phone: [phone]          Patient: Nano Sosa   MR Number: [id number]   YOB: 1955   Date of Visit: 6/4/2018       Dear Dr. Eva Shah:    Thank you for referring Nano Sosa to me for evaluation. Attached you will find relevant portions of my assessment and plan of care.    If you have questions, please do not hesitate to call me. I look forward to following Nano Sosa along with you.    Sincerely,    Manuel Ballard MD    Enclosure  CC:  No Recipients    If you would like to receive this communication electronically, please contact [email or web address] or [phone] to request more information on [employer] access.    For providers and/or their staff who would like to refer a patient to [facility], please contact us through our one-stop-shop provider referral line, [facility], at [phone].    If you feel you have received this communication in error or would no longer like to receive these types of communications, please e-mail [email or web address]

## 2018-06-04 NOTE — PROGRESS NOTES
DATE: 6/4/2018  PATIENT: Nano Sosa    CHIEF COMPLAINT: L foot pain, R foot pain    HISTORY:  Nano Sosa is a 63 y.o. female with h/o R bunion surgery at [facility] ~2006 here for initial evaluation of L and R foot pain.    The L foot pain has been present for about one year and is worsening.  Located predominantly over 1st MTPJ.  Denies injury or other precipitating factor.  Also has some pain more recently over lateral plantar forefoot. The patient describes the pain as stiffness.  Pain is mild to moderate.  The pain is worse with walking especially during pushoff and biking and improved by voltaren gel. There is no associated numbness and tingling.  Prior treatments have included voltaren gel, OTC inserts.    R foot pain has been present for about one year.  There is a prominence over her distal dorsal 1st MT that is painful and rubs when she wears shoes.      PAST MEDICAL/SURGICAL HISTORY:  Past Medical History:   Diagnosis Date    General anesthetics causing adverse effect in therapeutic use     wakes up hyperventilating/chills    Goiter     Hashimoto's disease     Migraine     Osteopenia     Rectal mass     Ulcerative colitis     Urinary tract infection     Vitamin D deficiency      Past Surgical History:   Procedure Laterality Date    BUNIONECTOMY      right    colon sx      COLONOSCOPY N/A 12/19/2016    Procedure: COLONOSCOPY;  Surgeon: Alli Peña MD;  Location: [address]);  Service: Endoscopy;  Laterality: N/A;    f.e.s.s.  12/5/2014    FACIAL COSMETIC SURGERY      TOTAL ABDOMINAL HYSTERECTOMY  1994    [facility]       Current Medications:   Current Outpatient Prescriptions:     b complex vitamins tablet, Take 1 tablet by mouth once daily., Disp: , Rfl:     cholecalciferol, vitamin D3, 2,000 unit Tab, Take 1 tablet by mouth once daily., Disp: , Rfl:     conjugated estrogens (PREMARIN) vaginal cream, Place 0.5 g vaginally twice a week. Insert into vagina as directed,  "Disp: 30 g, Rfl: 6    multivitamin capsule, Take 1 capsule by mouth once daily., Disp: , Rfl:     diclofenac sodium 1 % Gel, Apply 2 g topically 4 (four) times daily., Disp: 400 g, Rfl: 0    sodium,potassium,mag sulfates (SUPREP BOWEL PREP KIT) 17.5-3.13-1.6 gram SolR, Take 1 kit by mouth as directed., Disp: 354 mL, Rfl: 0    Social History:   Social History     Social History    Marital status: Single     Spouse name: N/A    Number of children: N/A    Years of education: N/A     Occupational History    Not on file.     Social History Main Topics    Smoking status: Former Smoker     Packs/day: 1.00     Years: 30.00     Types: Cigarettes     Quit date: 8/21/1999    Smokeless tobacco: Never Used    Alcohol use 3.6 oz/week     4 Glasses of wine, 2 Standard drinks or equivalent per week      Comment: weekends    Drug use: No    Sexual activity: Not Currently     Partners: Male     Birth control/ protection: See Surgical Hx     Other Topics Concern    Not on file     Social History Narrative    No narrative on file       REVIEW OF SYSTEMS:  Constitutional: negative for fevers  Eyes: no visual changes  ENT: negative for hearing loss  Respiratory: negative for dyspnea  Cardiovascular: negative for chest pain  Gastrointestinal: negative for abdominal pain  Genitourinary: negative for dysuria  Neurological: negative for headaches  Behavioral/Psych: negative for hallucinations  Endocrine: negative for temperature intolerance      PHYSICAL EXAMINATION:    /82   Pulse 65   Ht 5' 7" (1.702 m)   Wt 100 kg (220 lb 5.6 oz)   BMI 34.51 kg/m²     General: The patient is a 63 y.o. female in no apparent distress, the patient is orientatied to person, place and time.   Psych: Normal mood and affect  HEENT:  NCAT, sclera nonicteric  Lungs:  Respirations are equal and unlabored.  CV:  2+ bilateral upper and lower extremity pulses.  Skin:  Intact throughout.  Musculoskeletal: No pain with the range of motion of the " bilateral hips. No trochanteric tenderness to palpation. No pain with range of motion about the bilateral knees.    MSK:  LLE:   Skin intact  Bunion and bunionette deformities  No ecchymoses  Bony prominence over 1st MTPJ dorsally  TTP 1st MTPJ, webbed space between 3rd and 4th MTs, 5th MTPJ  Positive squeeze test  Pain at extremes of 1st MTPJ ROM  Positive 1st MTPJ grind test  No pain with midrange of motion 1st MTPJ  SILT T/SP/DP/Nicolas/Sa  Motor intact T/SP/DP  Brisk cap refill  Warm well perfused extremities  DP palpable and equal to contralateral    RLE:   Well healed longitudinal incision over 1st MTPJ  Prominence over dorsal distal 1st MT that is TTP  No TTP 1st MTPJ  1st MTPJ mild extension contracture  No pain with 1st MTPJ ROM  SILT T/SP/DP/Nicolas/Sa  Motor intact T/SP/DP  Brisk cap refill  Warm well perfused extremities  DP palpable and equal to contralateral            IMAGING:     Radiographs of the B foot were ordered and personally reviewed with the patient today.  R foot shows well healed osteotomy with single obliquely oriented screw.  L foot shows hallux valgus with HVA 28 deg, LEIGH 14 deg; IMA4-5 10 deg.    ASSESSMENT/PLAN:    1. Bilateral foot pain  X-Ray Foot Complete Bilateral   2. Hallux valgus, left     3. Bunionette of left foot     4. Painful orthopaedic hardware, screw right foot         Patient has R MT prominent hardware and L hallux valgus and bunionette deformities that are causing increasing pain to the point that she thinks she can no longer live with it despite conservative measures.  Discussed surgical options in detail including R 1st MT proximal osteotomy, R 5th bunionectomy vs MT osteotomy, L MT HWR.  Patient will think about surgery and call if she decides to proceed.  Preop will be scheduled at that time.      I have personally taken the history and examined this patient and agree with the residents note as stated above.

## 2018-06-06 ENCOUNTER — HOSPITAL ENCOUNTER (OUTPATIENT)
Dept: RADIOLOGY | Facility: HOSPITAL | Age: 63
Discharge: HOME | End: 2018-06-06
Attending: INTERNAL MEDICINE
Payer: COMMERCIAL

## 2018-06-06 ENCOUNTER — HOSPITAL ENCOUNTER (OUTPATIENT)
Dept: RADIOLOGY | Facility: CLINIC | Age: 63
Discharge: HOME | End: 2018-06-06
Attending: INTERNAL MEDICINE
Payer: COMMERCIAL

## 2018-06-06 DIAGNOSIS — E04.1 THYROID NODULE: ICD-10-CM

## 2018-06-06 DIAGNOSIS — R91.8 PULMONARY NODULES: ICD-10-CM

## 2018-06-06 DIAGNOSIS — R91.1 LUNG NODULE: ICD-10-CM

## 2018-06-06 DIAGNOSIS — M85.80 OSTEOPENIA, UNSPECIFIED LOCATION: ICD-10-CM

## 2018-06-06 DIAGNOSIS — Z86.39 HISTORY OF HASHIMOTO THYROIDITIS: ICD-10-CM

## 2018-06-06 PROCEDURE — 77080 DXA BONE DENSITY AXIAL: CPT | Mod: 26,,, | Performed by: INTERNAL MEDICINE

## 2018-06-06 PROCEDURE — 77080 DXA BONE DENSITY AXIAL: CPT | Mod: TC

## 2018-06-06 PROCEDURE — 71250 CT THORAX DX C-: CPT | Mod: TC

## 2018-06-06 PROCEDURE — 71250 CT THORAX DX C-: CPT | Mod: 26,,, | Performed by: RADIOLOGY

## 2018-06-06 PROCEDURE — 76536 US EXAM OF HEAD AND NECK: CPT | Mod: TC

## 2018-06-06 PROCEDURE — 76536 US EXAM OF HEAD AND NECK: CPT | Mod: 26,,, | Performed by: RADIOLOGY

## 2018-06-15 ENCOUNTER — CLINICAL SUPPORT (OUTPATIENT)
Dept: INTERNAL MEDICINE | Facility: CLINIC | Age: 63
End: 2018-06-15
Payer: COMMERCIAL

## 2018-06-15 VITALS — WEIGHT: 220.88 LBS | BODY MASS INDEX: 34.6 KG/M2

## 2018-06-15 NOTE — PROGRESS NOTES
Health  Follow-Up Note   [x] Office  [] Phone  Notes from previous session reviewed.   [x] Previous Session Goals unchanged.   [] Patient/Caregiver Change in Goals.  Goals added or changed by Patient/Caregiver since program participation:  1.    Continue same plan  2. Taking pictures of meals and putting in joey      Additional/Changed support that patient/caregiver has experienced/sought?  (Indicate readiness, support from family, friends, others, community groups, etc)  1.  Friends    Additional/Changed obstacles that could prevent patient/caregiver from reaching their goals?  1.  Hidden sugars    Feedback provided:  1.  Praised for great job down 2.65 lb in 2 weeks. Down 4.4 lb since 5/21/18 3.5 weeks.     Diagnostic values/Desriptors for follow-up as needed for chronic condition(s)   Weight: 100.2 kg 220.9 lb    Interventions:   1. Health  listened reflectively, validated thoughts and feelings, offered support and encouragement.   2. Allowed patient to express themselves in a non-biased atmosphere.  3. Health  assisted pt to problem-solve obstacles such as being in a challenging environment and dealing with these challenges.   4. Motivational Interviewed interventions utilized (OARS).   5. Patient responded favorably to interventions and remained actively engaged in the session.   6. Health  will remain available and connected for patient by phone and/or office visits.   7. Positive reinforcement, emotional support and encouragement provided.   8. Focused Education: MI, recipes    Plan:  [x] Pt will work on goals as stated above.   [x] Pt will contact Health  for any questions, concerns or needs.  [x] Pt will follow up with Health  in office on   6/29/18 at 0730.     [] Pt will follow up with Health  on phone in:        [x] Health  will remain available.   [] Health  will contact patient by phone in:        [] Health  will consult:        [] Health  will  inform Provider via EPIC messaging.     Impression:  1. Behavior is consistent with    Action   Stage of Change.   2. Participation level:       [x] Receptive      [x] Interactive      [] Guarded and Resistant      [x] Self Motivated      [] Refused/Declined to participate   3. [x] Pt voiced understanding of all information presented.       [] Pt voiced needing further information/education. This will be arranged.       [x] Pt would benefit from further education/information as identified by this health . This will be arranged.     Ami rKaus RN HC

## 2018-07-17 ENCOUNTER — OFFICE VISIT (OUTPATIENT)
Dept: URGENT CARE | Facility: CLINIC | Age: 63
End: 2018-07-17
Payer: COMMERCIAL

## 2018-07-17 VITALS
RESPIRATION RATE: 14 BRPM | SYSTOLIC BLOOD PRESSURE: 144 MMHG | HEART RATE: 81 BPM | HEIGHT: 67 IN | BODY MASS INDEX: 34.53 KG/M2 | OXYGEN SATURATION: 96 % | TEMPERATURE: 98 F | WEIGHT: 220 LBS | DIASTOLIC BLOOD PRESSURE: 84 MMHG

## 2018-07-17 DIAGNOSIS — J02.9 VIRAL PHARYNGITIS: Primary | ICD-10-CM

## 2018-07-17 DIAGNOSIS — R05.9 COUGH: ICD-10-CM

## 2018-07-17 LAB
CTP QC/QA: YES
S PYO RRNA THROAT QL PROBE: NEGATIVE

## 2018-07-17 PROCEDURE — 99214 OFFICE O/P EST MOD 30 MIN: CPT | Mod: S$GLB,,, | Performed by: FAMILY MEDICINE

## 2018-07-17 PROCEDURE — 3008F BODY MASS INDEX DOCD: CPT | Mod: CPTII,S$GLB,, | Performed by: FAMILY MEDICINE

## 2018-07-17 NOTE — PROGRESS NOTES
"Subjective:       Patient ID: Nano Sosa is a 63 y.o. female.    Vitals:  height is 5' 7" (1.702 m) and weight is 99.8 kg (220 lb). Her oral temperature is 98.1 °F (36.7 °C). Her blood pressure is 144/84 (abnormal) and her pulse is 81. Her respiration is 14 and oxygen saturation is 96%.     Chief Complaint: Sore Throat    Onset from yesterday morning. Also mild cough and neck sore. She has been gargling salt water and doing an herbal throat spray that has helped. No fever. She was traveling with someone who was sick and concerned about strep.      Sore Throat    This is a new problem. The current episode started yesterday. The problem has been rapidly worsening. Neither side of throat is experiencing more pain than the other. There has been no fever. The pain is at a severity of 6/10. The pain is moderate. Associated symptoms include coughing and neck pain. Pertinent negatives include no abdominal pain, diarrhea, headaches, shortness of breath or vomiting. Treatments tried: Sore throat.     Review of Systems   Constitution: Negative for chills and fever.   HENT: Positive for sore throat.    Eyes: Negative for blurred vision.   Cardiovascular: Negative for chest pain.   Respiratory: Positive for cough. Negative for shortness of breath.    Skin: Negative for rash.   Musculoskeletal: Positive for neck pain. Negative for back pain and joint pain.   Gastrointestinal: Negative for abdominal pain, diarrhea, nausea and vomiting.   Neurological: Negative for headaches.   Psychiatric/Behavioral: The patient is not nervous/anxious.        Objective:      Physical Exam   Constitutional: She is oriented to person, place, and time. She appears well-developed and well-nourished. She is cooperative.  Non-toxic appearance. She does not appear ill. No distress.   HENT:   Head: Normocephalic and atraumatic.   Right Ear: Hearing, tympanic membrane, external ear and ear canal normal. Tympanic membrane is not erythematous. No " middle ear effusion.   Left Ear: Hearing, tympanic membrane, external ear and ear canal normal. Tympanic membrane is not erythematous.  No middle ear effusion.   Nose: Mucosal edema present. No rhinorrhea or nasal deformity. No epistaxis. Right sinus exhibits no maxillary sinus tenderness and no frontal sinus tenderness. Left sinus exhibits no maxillary sinus tenderness and no frontal sinus tenderness.   Mouth/Throat: Uvula is midline and mucous membranes are normal. No trismus in the jaw. Normal dentition. No uvula swelling. Posterior oropharyngeal erythema (mild) present. No oropharyngeal exudate. No tonsillar exudate.   Eyes: Conjunctivae and lids are normal. No scleral icterus.   Sclera clear bilat   Neck: Trachea normal, full passive range of motion without pain and phonation normal. Neck supple.   Cardiovascular: Normal rate, regular rhythm, normal heart sounds, intact distal pulses and normal pulses.    Pulmonary/Chest: Effort normal and breath sounds normal. No respiratory distress. She has no decreased breath sounds. She has no wheezes. She has no rhonchi.   Abdominal: Soft. Normal appearance and bowel sounds are normal. She exhibits no distension. There is no tenderness.   Musculoskeletal: Normal range of motion. She exhibits no edema or deformity.   Lymphadenopathy:     She has no cervical adenopathy.   Neurological: She is alert and oriented to person, place, and time. She exhibits normal muscle tone. Coordination normal.   Skin: Skin is warm, dry and intact. She is not diaphoretic. No pallor.   Psychiatric: She has a normal mood and affect. Her speech is normal and behavior is normal. Judgment and thought content normal. Cognition and memory are normal.   Nursing note and vitals reviewed.      Results for orders placed or performed in visit on 07/17/18   POCT rapid strep A   Result Value Ref Range    Rapid Strep A Screen Negative Negative     Acceptable Yes        Assessment:       1.  Viral pharyngitis    2. Cough        Plan:         Viral pharyngitis  -     POCT rapid strep A    Cough    pt denied need for rx cough medication    Patient Instructions   PLEASE READ YOUR DISCHARGE INSTRUCTIONS ENTIRELY AS IT CONTAINS IMPORTANT INFORMATION.    YOUR STREP WAS NEGATIVE    You can continue the herbal throat spray    Use over the counter flonase: one spray each nostril twice daily OR two sprays each nostril once daily.   If you find this dries your nose out or your nose bleeds, try using over the counter nasal saline a few minutes prior to using the flonase to moisten the lining of your nose.     Please take an over the counter antihistamine medication (allegra/Claritin/Zyrtec) of your choice as directed.    Sore throat recommendations: Warm fluids, warm salt water gargles, throat lozenges, tea, honey, soup, rest, hydration.    Try mucinex DM to suppress cough and break up mucus    Tylenol and ibuprofen    Please return or see your primary care doctor if you develop new or worsening symptoms.     You must understand that you have received an Urgent Care treatment only and that you may be released before all of your medical problems are known or treated.    Viral Pharyngitis (Sore Throat)    You (or your child, if your child is the patient) have pharyngitis (sore throat). This infection is caused by a virus. It can cause throat pain that is worse when swallowing, aching all over, headache, and fever. The infection may be spread by coughing, kissing, or touching others after touching your mouth or nose. Antibiotic medications do not work against viruses, so they are not used for treating this condition.  Home care  · If your symptoms are severe, rest at home. Return to work or school when you feel well enough.   · Drink plenty of fluids to avoid dehydration.  · For children: Use acetaminophen for fever, fussiness or discomfort. In infants over six months of age, you may use ibuprofen instead of  acetaminophen. (NOTE: If your child has chronic liver or kidney disease or ever had a stomach ulcer or GI bleeding, talk with your doctor before using these medicines.) (NOTE: Aspirin should never be used in anyone under 18 years of age who is ill with a fever. It may cause severe liver damage.)   · For adults: You may use acetaminophen or ibuprofen to control pain or fever, unless another medicine was prescribed for this. (NOTE: If you have chronic liver or kidney disease or ever had a stomach ulcer or GI bleeding, talk with your doctor before using these medicines.)  · Throat lozenges or numbing throat sprays can help reduce pain. Gargling with warm salt water will also help reduce throat pain. For this, dissolve 1/2 teaspoon of salt in 1 glass of warm water. To help soothe a sore throat, children can sip on juice or a popsicle. Children 5 years and older can also suck on a lollipop or hard candy.  · Avoid salty or spicy foods, which can be irritating to the throat.  Follow-up care  Follow up with your healthcare provider or our staff if you are not improving over the next week.  When to seek medical advice  Call your healthcare provider right away if any of these occur:  · Fever as directed by your doctor.  For children, seek care if:  ¨ Your child is of any age and has repeated fevers above 104°F (40°C).  ¨ Your child is younger than 2 years of age and has a fever of 100.4°F (38°C) that continues for more than 1 day.  ¨ Your child is 2 years old or older and has a fever of 100.4°F (38°C) that continues for more than 3 days.  · New or worsening ear pain, sinus pain, or headache  · Painful lumps in the back of neck  · Stiff neck  · Lymph nodes are getting larger  · Inability to swallow liquids, excessive drooling, or inability to open mouth wide due to throat pain  · Signs of dehydration (very dark urine or no urine, sunken eyes, dizziness)  · Trouble breathing or noisy breathing  · Muffled voice  · New  rash  · Child appears to be getting sicker  Date Last Reviewed: 4/13/2015  © 0065-2813 [employer] [address] [place] [address], [place] [address]. All rights reserved. This information is not intended as a substitute for professional medical care. Always follow your healthcare professional's instructions.

## 2018-07-17 NOTE — PATIENT INSTRUCTIONS
PLEASE READ YOUR DISCHARGE INSTRUCTIONS ENTIRELY AS IT CONTAINS IMPORTANT INFORMATION.    YOUR STREP WAS NEGATIVE    You can continue the herbal throat spray    Use over the counter flonase: one spray each nostril twice daily OR two sprays each nostril once daily.   If you find this dries your nose out or your nose bleeds, try using over the counter nasal saline a few minutes prior to using the flonase to moisten the lining of your nose.     Please take an over the counter antihistamine medication (allegra/Claritin/Zyrtec) of your choice as directed.    Sore throat recommendations: Warm fluids, warm salt water gargles, throat lozenges, tea, honey, soup, rest, hydration.    Try mucinex DM to suppress cough and break up mucus    Tylenol and ibuprofen    Please return or see your primary care doctor if you develop new or worsening symptoms.     You must understand that you have received an Urgent Care treatment only and that you may be released before all of your medical problems are known or treated.    Viral Pharyngitis (Sore Throat)    You (or your child, if your child is the patient) have pharyngitis (sore throat). This infection is caused by a virus. It can cause throat pain that is worse when swallowing, aching all over, headache, and fever. The infection may be spread by coughing, kissing, or touching others after touching your mouth or nose. Antibiotic medications do not work against viruses, so they are not used for treating this condition.  Home care  · If your symptoms are severe, rest at home. Return to work or school when you feel well enough.   · Drink plenty of fluids to avoid dehydration.  · For children: Use acetaminophen for fever, fussiness or discomfort. In infants over six months of age, you may use ibuprofen instead of acetaminophen. (NOTE: If your child has chronic liver or kidney disease or ever had a stomach ulcer or GI bleeding, talk with your doctor before using these medicines.) (NOTE:  Aspirin should never be used in anyone under 18 years of age who is ill with a fever. It may cause severe liver damage.)   · For adults: You may use acetaminophen or ibuprofen to control pain or fever, unless another medicine was prescribed for this. (NOTE: If you have chronic liver or kidney disease or ever had a stomach ulcer or GI bleeding, talk with your doctor before using these medicines.)  · Throat lozenges or numbing throat sprays can help reduce pain. Gargling with warm salt water will also help reduce throat pain. For this, dissolve 1/2 teaspoon of salt in 1 glass of warm water. To help soothe a sore throat, children can sip on juice or a popsicle. Children 5 years and older can also suck on a lollipop or hard candy.  · Avoid salty or spicy foods, which can be irritating to the throat.  Follow-up care  Follow up with your healthcare provider or our staff if you are not improving over the next week.  When to seek medical advice  Call your healthcare provider right away if any of these occur:  · Fever as directed by your doctor.  For children, seek care if:  ¨ Your child is of any age and has repeated fevers above 104°F (40°C).  ¨ Your child is younger than 2 years of age and has a fever of 100.4°F (38°C) that continues for more than 1 day.  ¨ Your child is 2 years old or older and has a fever of 100.4°F (38°C) that continues for more than 3 days.  · New or worsening ear pain, sinus pain, or headache  · Painful lumps in the back of neck  · Stiff neck  · Lymph nodes are getting larger  · Inability to swallow liquids, excessive drooling, or inability to open mouth wide due to throat pain  · Signs of dehydration (very dark urine or no urine, sunken eyes, dizziness)  · Trouble breathing or noisy breathing  · Muffled voice  · New rash  · Child appears to be getting sicker  Date Last Reviewed: 4/13/2015  © 6909-1836 [employer] [address] [place] [address], [place] [address]. All rights reserved. This  information is not intended as a substitute for professional medical care. Always follow your healthcare professional's instructions.

## 2018-07-23 ENCOUNTER — OFFICE VISIT (OUTPATIENT)
Dept: INTERNAL MEDICINE | Facility: CLINIC | Age: 63
End: 2018-07-23
Payer: COMMERCIAL

## 2018-07-23 ENCOUNTER — CLINICAL SUPPORT (OUTPATIENT)
Dept: INTERNAL MEDICINE | Facility: CLINIC | Age: 63
End: 2018-07-23
Payer: COMMERCIAL

## 2018-07-23 VITALS
OXYGEN SATURATION: 96 % | HEIGHT: 67 IN | SYSTOLIC BLOOD PRESSURE: 102 MMHG | BODY MASS INDEX: 34.19 KG/M2 | HEART RATE: 72 BPM | TEMPERATURE: 98 F | WEIGHT: 217.81 LBS | DIASTOLIC BLOOD PRESSURE: 72 MMHG

## 2018-07-23 VITALS — WEIGHT: 217.63 LBS | BODY MASS INDEX: 34.08 KG/M2

## 2018-07-23 DIAGNOSIS — J32.9 SINUSITIS, UNSPECIFIED CHRONICITY, UNSPECIFIED LOCATION: Primary | ICD-10-CM

## 2018-07-23 DIAGNOSIS — Z91.09 ENVIRONMENTAL ALLERGIES: ICD-10-CM

## 2018-07-23 PROCEDURE — 99999 PR PBB SHADOW E&M-EST. PATIENT-LVL IV: CPT | Mod: PBBFAC,,, | Performed by: NURSE PRACTITIONER

## 2018-07-23 PROCEDURE — 99214 OFFICE O/P EST MOD 30 MIN: CPT | Mod: S$GLB,,, | Performed by: NURSE PRACTITIONER

## 2018-07-23 PROCEDURE — 3008F BODY MASS INDEX DOCD: CPT | Mod: CPTII,S$GLB,, | Performed by: NURSE PRACTITIONER

## 2018-07-23 PROCEDURE — 99999 PR PBB SHADOW E&M-EST. PATIENT-LVL I: CPT | Mod: PBBFAC,,,

## 2018-07-23 RX ORDER — AZELASTINE 1 MG/ML
1 SPRAY, METERED NASAL 2 TIMES DAILY
Qty: 30 ML | Refills: 2 | Status: SHIPPED | OUTPATIENT
Start: 2018-07-23 | End: 2022-05-18

## 2018-07-23 RX ORDER — DOXYCYCLINE HYCLATE 100 MG
100 TABLET ORAL EVERY 12 HOURS
Qty: 20 TABLET | Refills: 0 | Status: ON HOLD | OUTPATIENT
Start: 2018-07-23 | End: 2019-01-07 | Stop reason: HOSPADM

## 2018-07-23 NOTE — PROGRESS NOTES
Subjective:       Patient ID: Nano Sosa is a 63 y.o. female.    Chief Complaint: Sinus Problem    Pt here with nasal congestion, sinus pressure for over a week, sx not improving.  Pt denies fever.  No nausea/ diarrhea.  Vomited x 1 last night.  Took Zyrtec x 1 last night and has been using a sinus rinse bottle.  Pt states that she woke up this am with puffy eyes and some eye discharge which has since gotten better.        Sinus Problem   Associated symptoms include congestion, coughing and sinus pressure. Pertinent negatives include no chills, ear pain, neck pain, sneezing or sore throat.     Review of Systems   Constitutional: Positive for fatigue. Negative for chills and fever.   HENT: Positive for congestion, postnasal drip, rhinorrhea and sinus pressure. Negative for ear discharge, ear pain, sneezing, sore throat, tinnitus, trouble swallowing and voice change.    Respiratory: Positive for cough. Negative for chest tightness.    Cardiovascular: Negative for chest pain.   Gastrointestinal: Positive for vomiting. Negative for abdominal pain, diarrhea and nausea.   Genitourinary: Negative for difficulty urinating.   Musculoskeletal: Negative for arthralgias, myalgias, neck pain and neck stiffness.   Skin: Negative for rash.   Hematological: Negative for adenopathy.   Psychiatric/Behavioral: Negative for sleep disturbance.         Past Medical History:   Diagnosis Date    General anesthetics causing adverse effect in therapeutic use     wakes up hyperventilating/chills    Goiter     Hashimoto's disease     Migraine     Osteopenia     Rectal mass     Ulcerative colitis     Urinary tract infection     Vitamin D deficiency      Past Surgical History:   Procedure Laterality Date    BUNIONECTOMY      right    colon sx      COLONOSCOPY N/A 12/19/2016    Procedure: COLONOSCOPY;  Surgeon: Alli Peña MD;  Location: [address];  Service: Endoscopy;  Laterality: N/A;    f.e.s.s.  12/5/2014  "   FACIAL COSMETIC SURGERY      TOTAL [facility]BDOMIN[facility]  1994    [facility]     Social History     Social History Narrative    No narrative on file     Family History   Problem Relation Age of Onset    Rheum arthritis Mother     Migraines Mother     Breast cancer Maternal Aunt     Heart disease Maternal Uncle     Hypertension Maternal Uncle     Colon cancer Maternal Aunt     Anesthesia problems Neg Hx     Diabetes Neg Hx     Ovarian cancer Neg Hx      Outpatient Encounter Prescriptions as of 7/23/2018   Medication Sig Dispense Refill    b complex vitamins tablet Take 1 tablet by mouth once daily.      cholecalciferol, vitamin D3, 2,000 unit Tab Take 1 tablet by mouth once daily.      conjugated estrogens (PREMARIN) vaginal cream Place 0.5 g vaginally twice a week. Insert into vagina as directed 30 g 6    diclofenac sodium 1 % Gel Apply 2 g topically 4 (four) times daily. 400 g 0    multivitamin capsule Take 1 capsule by mouth once daily.      azelastine (ASTELIN) 137 mcg (0.1 %) nasal spray 1 spray (137 mcg total) by Nasal route 2 (two) times daily. 30 mL 2    doxycycline (VIBRA-TABS) 100 MG tablet Take 1 tablet (100 mg total) by mouth every 12 (twelve) hours. 20 tablet 0    sodium,potassium,mag sulfates (SUPREP BOWEL PREP KIT) 17.5-3.13-1.6 gram SolR Take 1 kit by mouth as directed. 354 mL 0     No facility-administered encounter medications on file as of 7/23/2018.      Last 3 sets of Vitals  Vitals - 1 value per visit 7/17/2018 7/23/2018 7/23/2018   SYSTOLIC 144 - 102   DIASTOLIC 84 - 72   PULSE 81 - 72   TEMPERATURE 98.1 - 98.3   RESPIRATIONS 14 - -   SPO2 96 - 96   Weight (lb) 220 217.59 217.81   Weight (kg) 99.791 98.7 98.8   HEIGHT 5' 7" - 5' 7"   BODY MASS INDEX 34.46 34.08 34.11   VISIT REPORT - - -   Pain Score  - - 0   Some recent data might be hidden         Objective:      Physical Exam   Constitutional: She is oriented to person, place, and time. She appears well-developed and " well-nourished. No distress.   HENT:   Head: Normocephalic and atraumatic.   Right Ear: External ear normal.   Left Ear: External ear normal.   Nose: Mucosal edema and rhinorrhea present. Right sinus exhibits frontal sinus tenderness. Left sinus exhibits frontal sinus tenderness.   Mouth/Throat: Uvula is midline, oropharynx is clear and moist and mucous membranes are normal. No tonsillar exudate.   Eyes: Conjunctivae and EOM are normal. Pupils are equal, round, and reactive to light. Right eye exhibits no discharge. Left eye exhibits no discharge.   Neck: Normal range of motion. Neck supple.   Cardiovascular: Normal rate, regular rhythm, normal heart sounds and intact distal pulses.    Pulmonary/Chest: Effort normal and breath sounds normal. No stridor. No respiratory distress.   Lymphadenopathy:     She has no cervical adenopathy.   Neurological: She is alert and oriented to person, place, and time.   Skin: Skin is warm and dry. Capillary refill takes less than 2 seconds. No rash noted. She is not diaphoretic. No erythema. No pallor.   Psychiatric: She has a normal mood and affect. Her behavior is normal. Judgment and thought content normal.   Nursing note and vitals reviewed.          Lab Results   Component Value Date    WBC 7.04 03/19/2018    RBC 4.17 03/19/2018    HGB 12.4 03/19/2018    HCT 38.2 03/19/2018    MCV 92 03/19/2018    MCH 29.7 03/19/2018    MCHC 32.5 03/19/2018    RDW 13.6 03/19/2018     03/19/2018    MPV 10.3 03/19/2018    GRAN 4.2 03/19/2018    GRAN 60.0 03/19/2018    LYMPH 2.2 03/19/2018    LYMPH 31.5 03/19/2018    MONO 0.5 03/19/2018    MONO 6.4 03/19/2018    EOS 0.1 03/19/2018    BASO 0.03 03/19/2018    EOSINOPHIL 1.6 03/19/2018    BASOPHIL 0.4 03/19/2018     Lab Results   Component Value Date    WBC 7.04 03/19/2018    HGB 12.4 03/19/2018    HCT 38.2 03/19/2018     03/19/2018    CHOL 215 (H) 03/19/2018    TRIG 91 03/19/2018    HDL 67 03/19/2018    ALT 24 03/19/2018    AST 18  03/19/2018     03/19/2018    K 4.2 03/19/2018     03/19/2018    CREATININE 0.8 03/19/2018    BUN 22 03/19/2018    CO2 23 03/19/2018    TSH 2.425 03/19/2018    HGBA1C 5.3 12/18/2015       Assessment:       1. Sinusitis, unspecified chronicity, unspecified location    2. Environmental allergies        Plan:           Nano was seen today for sinus problem.    Diagnoses and all orders for this visit:    Sinusitis, unspecified chronicity, unspecified location  -     doxycycline (VIBRA-TABS) 100 MG tablet; Take 1 tablet (100 mg total) by mouth every 12 (twelve) hours.    Environmental allergies  -     azelastine (ASTELIN) 137 mcg (0.1 %) nasal spray; 1 spray (137 mcg total) by Nasal route 2 (two) times daily.      Patient Instructions   Take Doxycycline twice a day for 10 days take with food avoid calcium/ dairy for 4 hours before or after as the calcium can interfere with the absorption of the antibiotic    Use Astelin nasal spray daily    Take OTC Claritin/ Allegra or zyrtec when pollen levels are high to help avoid some of your symptoms    Call for any concerns

## 2018-07-23 NOTE — PROGRESS NOTES
Health  Follow-Up Note   [x] Office  [] Phone  Notes from previous session reviewed.   [x] Previous Session Goals unchanged.   [] Patient/Caregiver Change in Goals.  Goals added or changed by Patient/Caregiver since program participation:  1.     Continue same plan    Additional/Changed obstacles that could prevent patient/caregiver from reaching their goals?  1.  Sick for the last week with sinus, sore throat and coughing seeing urgent care today.    Feedback provided:  1. Praised for continued effort and determination down 3.3 lbs total now 6 lbs     Diagnostic values/Desriptors for follow-up as needed for chronic condition(s)   Weight: 98.7 kg 217.59 lb     Interventions:   1. Health  listened reflectively, validated thoughts and feelings, offered support and encouragement.   2. Allowed patient to express themselves in a non-biased atmosphere.  3. Health  assisted pt to problem-solve obstacles such as being in a challenging environment and dealing with these challenges.   4. Motivational Interviewed interventions utilized (OARS).   5. Patient responded favorably to interventions and remained actively engaged in the session.   6. Health  will remain available and connected for patient by phone and/or office visits.   7. Positive reinforcement, emotional support and encouragement provided.   8. Focused Education: MI    Plan:  [x] Pt will work on goals as stated above.   [x] Pt will contact Health  for any questions, concerns or needs.  [x] Pt will follow up with Health  in office on   8/10/18 at 0830.     [] Pt will follow up with Health  on phone in:        [x] Health  will remain available.   [] Health  will contact patient by phone in:        [] Health  will consult:        [] Health  will inform Provider via EPIC messaging.     Impression:  1. Behavior is consistent with   Action    Stage of Change.   2. Participation level:       [x] Receptive      [x]  Interactive      [] Guarded and Resistant      [x] Self Motivated      [] Refused/Declined to participate   3. [x] Pt voiced understanding of all information presented.       [] Pt voiced needing further information/education. This will be arranged.       [x] Pt would benefit from further education/information as identified by this health . This will be arranged.     Ami Kraus RN HC

## 2018-08-08 ENCOUNTER — OFFICE VISIT (OUTPATIENT)
Dept: URGENT CARE | Facility: CLINIC | Age: 63
End: 2018-08-08
Payer: COMMERCIAL

## 2018-08-08 VITALS
RESPIRATION RATE: 18 BRPM | BODY MASS INDEX: 34.06 KG/M2 | HEIGHT: 67 IN | DIASTOLIC BLOOD PRESSURE: 85 MMHG | HEART RATE: 71 BPM | TEMPERATURE: 98 F | OXYGEN SATURATION: 97 % | WEIGHT: 217 LBS | SYSTOLIC BLOOD PRESSURE: 151 MMHG

## 2018-08-08 DIAGNOSIS — R30.0 DYSURIA: ICD-10-CM

## 2018-08-08 DIAGNOSIS — N30.01 ACUTE CYSTITIS WITH HEMATURIA: Primary | ICD-10-CM

## 2018-08-08 LAB
BILIRUB UR QL STRIP: NEGATIVE
GLUCOSE UR QL STRIP: NEGATIVE
KETONES UR QL STRIP: NEGATIVE
LEUKOCYTE ESTERASE UR QL STRIP: POSITIVE
PH, POC UA: 6 (ref 5–8)
POC BLOOD, URINE: POSITIVE
POC NITRATES, URINE: NEGATIVE
PROT UR QL STRIP: NEGATIVE
SP GR UR STRIP: 1.01 (ref 1–1.03)
UROBILINOGEN UR STRIP-ACNC: ABNORMAL (ref 0.1–1.1)

## 2018-08-08 PROCEDURE — 87077 CULTURE AEROBIC IDENTIFY: CPT

## 2018-08-08 PROCEDURE — 99214 OFFICE O/P EST MOD 30 MIN: CPT | Mod: S$GLB,,, | Performed by: NURSE PRACTITIONER

## 2018-08-08 PROCEDURE — 87086 URINE CULTURE/COLONY COUNT: CPT

## 2018-08-08 PROCEDURE — 87186 SC STD MICRODIL/AGAR DIL: CPT

## 2018-08-08 PROCEDURE — 87088 URINE BACTERIA CULTURE: CPT

## 2018-08-08 PROCEDURE — 3008F BODY MASS INDEX DOCD: CPT | Mod: CPTII,S$GLB,, | Performed by: NURSE PRACTITIONER

## 2018-08-08 RX ORDER — PHENAZOPYRIDINE HYDROCHLORIDE 200 MG/1
200 TABLET, FILM COATED ORAL 3 TIMES DAILY PRN
Qty: 6 TABLET | Refills: 0 | Status: SHIPPED | OUTPATIENT
Start: 2018-08-08 | End: 2018-08-10

## 2018-08-08 RX ORDER — NITROFURANTOIN 25; 75 MG/1; MG/1
100 CAPSULE ORAL 2 TIMES DAILY
Qty: 14 CAPSULE | Refills: 0 | Status: SHIPPED | OUTPATIENT
Start: 2018-08-08 | End: 2018-08-15

## 2018-08-08 NOTE — PROGRESS NOTES
"Subjective:       Patient ID: Nano Sosa is a 63 y.o. female.    Vitals:  height is 5' 7" (1.702 m) and weight is 98.4 kg (217 lb). Her temperature is 97.8 °F (36.6 °C). Her blood pressure is 151/85 (abnormal) and her pulse is 71. Her respiration is 18 and oxygen saturation is 97%.     Chief Complaint: Urinary Tract Infection    Patient presents with a "UTI" since yesterday with dysuria, urgency, and frequency.  No fever or flank pain.  She has not noted any hematuria.  No N/V.  Last UTI showed resistance and she had to switch to Macrobid.  She completed Macrobid.        Urinary Tract Infection    This is a new problem. The current episode started yesterday. The problem has been unchanged. The quality of the pain is described as aching. The pain is at a severity of 6/10. The pain is moderate. There has been no fever. She is not sexually active. There is no history of pyelonephritis. Associated symptoms include frequency and urgency. Pertinent negatives include no chills, discharge, flank pain, hematuria, hesitancy, nausea, possible pregnancy, vomiting, bubble bath use, constipation or rash. She has tried nothing for the symptoms. The treatment provided no relief. Her past medical history is significant for recurrent UTIs. Last UTI in March     Review of Systems   Constitution: Negative for chills and fever.   Skin: Negative for itching and rash.   Musculoskeletal: Negative for back pain.   Gastrointestinal: Negative for abdominal pain, constipation, nausea and vomiting.   Genitourinary: Positive for dysuria, frequency and urgency. Negative for flank pain, genital sores, hematuria, hesitancy, missed menses and non-menstrual bleeding.       Objective:      Physical Exam   Constitutional: She is oriented to person, place, and time. She appears well-developed and well-nourished.  Non-toxic appearance. She does not appear ill. No distress.   HENT:   Head: Normocephalic and atraumatic.   Right Ear: External ear " normal.   Left Ear: External ear normal.   Nose: Nose normal. No nasal deformity. No epistaxis.   Mouth/Throat: Oropharynx is clear and moist and mucous membranes are normal.   Eyes: Conjunctivae and lids are normal.   Neck: Trachea normal, normal range of motion and phonation normal. Neck supple.   Cardiovascular: Normal rate, regular rhythm, normal heart sounds and normal pulses.    Pulmonary/Chest: Effort normal and breath sounds normal.   Abdominal: Soft. Normal appearance and bowel sounds are normal. She exhibits no distension and no mass. There is no tenderness. There is no rigidity, no rebound, no guarding and no CVA tenderness.   Neurological: She is alert and oriented to person, place, and time.   Skin: Skin is warm, dry and intact.   Psychiatric: She has a normal mood and affect. Her speech is normal and behavior is normal. Cognition and memory are normal.   Nursing note and vitals reviewed.      Results for orders placed or performed in visit on 08/08/18   POCT Urinalysis, Dipstick, Automated, W/O Scope   Result Value Ref Range    POC Blood, Urine Positive (A) Negative    POC Bilirubin, Urine Negative Negative    POC Urobilinogen, Urine norm 0.1 - 1.1    POC Ketones, Urine Negative Negative    POC Protein, Urine Negative Negative    POC Nitrates, Urine Negative Negative    POC Glucose, Urine Negative Negative    pH, UA 6.0 5 - 8    POC Specific Gravity, Urine 1.010 1.003 - 1.029    POC Leukocytes, Urine Positive (A) Negative     Assessment:       1. Acute cystitis with hematuria    2. Dysuria        Plan:         Acute cystitis with hematuria  -     nitrofurantoin, macrocrystal-monohydrate, (MACROBID) 100 MG capsule; Take 1 capsule (100 mg total) by mouth 2 (two) times daily. for 7 days  Dispense: 14 capsule; Refill: 0  -     Urine culture    Dysuria  -     POCT Urinalysis, Dipstick, Automated, W/O Scope  -     phenazopyridine (PYRIDIUM) 200 MG tablet; Take 1 tablet (200 mg total) by mouth 3 (three) times  "daily as needed for Pain.  Dispense: 6 tablet; Refill: 0      Patient Instructions                                                                       UTI   If your condition worsens or fails to improve we recommend that you receive another evaluation at the ER immediately or contact your PCP to discuss your concerns or return here. You must understand that you've received an urgent care treatment only and that you may be released before all your medical problems are known or treated. You the patient will arrange for followup care as instructed.   If you had cultures done it will take 3-5 days to result. We will call you with the result.   If you are are female and on BCP use additional methods to prevent pregnancy while on the antibiotics and for one cycle after.   Cranberry juice may help. Get the 100% cranberry juice and mix 4 oz of juice with 4 oz of water and drink this 8 oz glass of liquid once a day.   Increase water intake to at least 8-10 glasses/day.  Do not wear contacts with Pyridium as it will stain them.  You may want to wear a panty liner with Pyridium as it may stain your underwear.  Avoid caffeine, alcohol, or spicy foods as they irritate the bladder.        Bladder Infection, Female (Adult)    Urine is normally doesn't have any bacteria in it. But bacteria can get into the urinary tract from the skin around the rectum. Or they can travel in the blood from elsewhere in the body. Once they are in your urinary tract, they can cause infection in the urethra (urethritis), the bladder (cystitis), or the kidneys (pyelonephritis).  The most common place for an infection is in the bladder. This is called a bladder infection. This is one of the most common infections in women. Most bladder infections are easily treated. They are not serious unless the infection spreads to the kidney.  The phrases "bladder infection," "UTI," and "cystitis" are often used to describe the same thing. But they are not always " the same. Cystitis is an inflammation of the bladder. The most common cause of cystitis is an infection.  Symptoms  The infection causes inflammation in the urethra and bladder. This causes many of the symptoms. The most common symptoms of a bladder infection are:  · Pain or burning when urinating  · Having to urinate more often than usual  · Urgent need to urinate  · Only a small amount of urine comes out  · Blood in urine  · Abdominal discomfort. This is usually in the lower abdomen above the pubic bone.  · Cloudy urine  · Strong- or bad-smelling urine  · Unable to urinate (urinary retention)  · Unable to hold urine in (urinary incontinence)  · Fever  · Loss of appetite  · Confusion (in older adults)  Causes  Bladder infections are not contagious. You can't get one from someone else, from a toilet seat, or from sharing a bath.  The most common cause of bladder infections is bacteria from the bowels. The bacteria get onto the skin around the opening of the urethra. From there, they can get into the urine and travel up to the bladder, causing inflammation and infection. This usually happens because of:  · Wiping improperly after urinating. Always wipe from front to back.  · Bowel incontinence  · Pregnancy  · Procedures such as having a catheter inserted  · Older age  · Not emptying your bladder. This can allow bacteria a chance to grow in your urine.  · Dehydration  · Constipation  · Sex  · Use of a diaphragm for birth control   Treatment  Bladder infections are diagnosed by a urine test. They are treated with antibiotics and usually clear up quickly without complications. Treatment helps prevent a more serious kidney infection.  Medicines  Medicines can help in the treatment of a bladder infection:  · Take antibiotics until they are used up, even if you feel better. It is important to finish them to make sure the infection has cleared.  · You can use acetaminophen or ibuprofen for pain, fever, or discomfort, unless  another medicine was prescribed. If you have chronic liver or kidney disease, talk with your healthcare provider before using these medicines. Also talk with your provider if you've ever had a stomach ulcer or gastrointestinal bleeding, or are taking blood-thinner medicines.  · If you are given phenazopydridine to reduce burning with urination, it will cause your urine to become a bright orange color. This can stain clothing.  Care and prevention  These self-care steps can help prevent future infections:  · Drink plenty of fluids to prevent dehydration and flush out your bladder. Do this unless you must restrict fluids for other health reasons, or your doctor told you not to.  · Proper cleaning after going to the bathroom is important. Wipe from front to back after using the toilet to prevent the spread of bacteria.  · Urinate more often. Don't try to hold urine in for a long time.  · Wear loose-fitting clothes and cotton underwear. Avoid tight-fitting pants.  · Improve your diet and prevent constipation. Eat more fresh fruit and vegetables, and fiber, and less junk and fatty foods.  · Avoid sex until your symptoms are gone.  · Avoid caffeine, alcohol, and spicy foods. These can irritate your bladder.  · Urinate right after intercourse to flush out your bladder.  · If you use birth control pills and have frequent bladder infections, discuss it with your doctor.  Follow-up care  Call your healthcare provider if all symptoms are not gone after 3 days of treatment. This is especially important if you have repeat infections.  If a culture was done, you will be told if your treatment needs to be changed. If directed, you can call to find out the results.  If X-rays were done, you will be told if the results will affect your treatment.  Call 911  Call 911 if any of the following occur:  · Trouble breathing  · Hard to wake up or confusion  · Fainting or loss of consciousness  · Rapid heart rate  When to seek medical  advice  Call your healthcare provider right away if any of these occur:  · Fever of 100.4ºF (38.0ºC) or higher, or as directed by your healthcare provider  · Symptoms are not better by the third day of treatment  · Back or belly (abdominal) pain that gets worse  · Repeated vomiting, or unable to keep medicine down  · Weakness or dizziness  · Vaginal discharge  · Pain, redness, or swelling in the outer vaginal area (labia)  Date Last Reviewed: 10/1/2016  © 2372-4021 [employer]. [address] [place] [address]. All rights reserved. This information is not intended as a substitute for professional medical care. Always follow your healthcare professional's instructions.

## 2018-08-08 NOTE — PATIENT INSTRUCTIONS
"                                                                    UTI   If your condition worsens or fails to improve we recommend that you receive another evaluation at the ER immediately or contact your PCP to discuss your concerns or return here. You must understand that you've received an urgent care treatment only and that you may be released before all your medical problems are known or treated. You the patient will arrange for followup care as instructed.   If you had cultures done it will take 3-5 days to result. We will call you with the result.   If you are are female and on BCP use additional methods to prevent pregnancy while on the antibiotics and for one cycle after.   Cranberry juice may help. Get the 100% cranberry juice and mix 4 oz of juice with 4 oz of water and drink this 8 oz glass of liquid once a day.   Increase water intake to at least 8-10 glasses/day.  Do not wear contacts with Pyridium as it will stain them.  You may want to wear a panty liner with Pyridium as it may stain your underwear.  Avoid caffeine, alcohol, or spicy foods as they irritate the bladder.        Bladder Infection, Female (Adult)    Urine is normally doesn't have any bacteria in it. But bacteria can get into the urinary tract from the skin around the rectum. Or they can travel in the blood from elsewhere in the body. Once they are in your urinary tract, they can cause infection in the urethra (urethritis), the bladder (cystitis), or the kidneys (pyelonephritis).  The most common place for an infection is in the bladder. This is called a bladder infection. This is one of the most common infections in women. Most bladder infections are easily treated. They are not serious unless the infection spreads to the kidney.  The phrases "bladder infection," "UTI," and "cystitis" are often used to describe the same thing. But they are not always the same. Cystitis is an inflammation of the bladder. The most common cause of " cystitis is an infection.  Symptoms  The infection causes inflammation in the urethra and bladder. This causes many of the symptoms. The most common symptoms of a bladder infection are:  · Pain or burning when urinating  · Having to urinate more often than usual  · Urgent need to urinate  · Only a small amount of urine comes out  · Blood in urine  · Abdominal discomfort. This is usually in the lower abdomen above the pubic bone.  · Cloudy urine  · Strong- or bad-smelling urine  · Unable to urinate (urinary retention)  · Unable to hold urine in (urinary incontinence)  · Fever  · Loss of appetite  · Confusion (in older adults)  Causes  Bladder infections are not contagious. You can't get one from someone else, from a toilet seat, or from sharing a bath.  The most common cause of bladder infections is bacteria from the bowels. The bacteria get onto the skin around the opening of the urethra. From there, they can get into the urine and travel up to the bladder, causing inflammation and infection. This usually happens because of:  · Wiping improperly after urinating. Always wipe from front to back.  · Bowel incontinence  · Pregnancy  · Procedures such as having a catheter inserted  · Older age  · Not emptying your bladder. This can allow bacteria a chance to grow in your urine.  · Dehydration  · Constipation  · Sex  · Use of a diaphragm for birth control   Treatment  Bladder infections are diagnosed by a urine test. They are treated with antibiotics and usually clear up quickly without complications. Treatment helps prevent a more serious kidney infection.  Medicines  Medicines can help in the treatment of a bladder infection:  · Take antibiotics until they are used up, even if you feel better. It is important to finish them to make sure the infection has cleared.  · You can use acetaminophen or ibuprofen for pain, fever, or discomfort, unless another medicine was prescribed. If you have chronic liver or kidney disease,  talk with your healthcare provider before using these medicines. Also talk with your provider if you've ever had a stomach ulcer or gastrointestinal bleeding, or are taking blood-thinner medicines.  · If you are given phenazopydridine to reduce burning with urination, it will cause your urine to become a bright orange color. This can stain clothing.  Care and prevention  These self-care steps can help prevent future infections:  · Drink plenty of fluids to prevent dehydration and flush out your bladder. Do this unless you must restrict fluids for other health reasons, or your doctor told you not to.  · Proper cleaning after going to the bathroom is important. Wipe from front to back after using the toilet to prevent the spread of bacteria.  · Urinate more often. Don't try to hold urine in for a long time.  · Wear loose-fitting clothes and cotton underwear. Avoid tight-fitting pants.  · Improve your diet and prevent constipation. Eat more fresh fruit and vegetables, and fiber, and less junk and fatty foods.  · Avoid sex until your symptoms are gone.  · Avoid caffeine, alcohol, and spicy foods. These can irritate your bladder.  · Urinate right after intercourse to flush out your bladder.  · If you use birth control pills and have frequent bladder infections, discuss it with your doctor.  Follow-up care  Call your healthcare provider if all symptoms are not gone after 3 days of treatment. This is especially important if you have repeat infections.  If a culture was done, you will be told if your treatment needs to be changed. If directed, you can call to find out the results.  If X-rays were done, you will be told if the results will affect your treatment.  Call 911  Call 911 if any of the following occur:  · Trouble breathing  · Hard to wake up or confusion  · Fainting or loss of consciousness  · Rapid heart rate  When to seek medical advice  Call your healthcare provider right away if any of these occur:  · Fever of  100.4ºF (38.0ºC) or higher, or as directed by your healthcare provider  · Symptoms are not better by the third day of treatment  · Back or belly (abdominal) pain that gets worse  · Repeated vomiting, or unable to keep medicine down  · Weakness or dizziness  · Vaginal discharge  · Pain, redness, or swelling in the outer vaginal area (labia)  Date Last Reviewed: 10/1/2016  © 0097-0455 [employer]. [address] [place] [address], [place] [address]. All rights reserved. This information is not intended as a substitute for professional medical care. Always follow your healthcare professional's instructions.

## 2018-08-11 LAB — BACTERIA UR CULT: NORMAL

## 2018-08-12 ENCOUNTER — TELEPHONE (OUTPATIENT)
Dept: URGENT CARE | Facility: CLINIC | Age: 63
End: 2018-08-12

## 2018-08-12 NOTE — TELEPHONE ENCOUNTER
----- Message from Sammi Archibald MD sent at 8/11/2018  3:28 PM CDT -----  Please notify patient that culture result returned as expected. Follow up with PCP with any problems    Informed patient about urine culture results

## 2018-11-21 DIAGNOSIS — Z12.11 SPECIAL SCREENING FOR MALIGNANT NEOPLASMS, COLON: ICD-10-CM

## 2018-11-21 RX ORDER — SODIUM, POTASSIUM,MAG SULFATES 17.5-3.13G
1 SOLUTION, RECONSTITUTED, ORAL ORAL DAILY
Qty: 354 ML | Refills: 0 | Status: SHIPPED | OUTPATIENT
Start: 2018-11-21 | End: 2019-01-06

## 2019-01-07 ENCOUNTER — ANESTHESIA (OUTPATIENT)
Dept: ENDOSCOPY | Facility: HOSPITAL | Age: 64
End: 2019-01-07
Payer: MEDICARE

## 2019-01-07 ENCOUNTER — ANESTHESIA EVENT (OUTPATIENT)
Dept: ENDOSCOPY | Facility: HOSPITAL | Age: 64
End: 2019-01-07
Payer: MEDICARE

## 2019-01-07 ENCOUNTER — HOSPITAL ENCOUNTER (OUTPATIENT)
Facility: HOSPITAL | Age: 64
Discharge: HOME | End: 2019-01-07
Attending: COLON & RECTAL SURGERY | Admitting: COLON & RECTAL SURGERY
Payer: MEDICARE

## 2019-01-07 VITALS
TEMPERATURE: 98 F | OXYGEN SATURATION: 99 % | RESPIRATION RATE: 16 BRPM | HEART RATE: 66 BPM | DIASTOLIC BLOOD PRESSURE: 77 MMHG | SYSTOLIC BLOOD PRESSURE: 126 MMHG

## 2019-01-07 DIAGNOSIS — Z12.11 SCREENING FOR COLON CANCER: Primary | ICD-10-CM

## 2019-01-07 PROCEDURE — 37000008 HC ANESTHESIA 1ST 15 MINUTES: Performed by: COLON & RECTAL SURGERY

## 2019-01-07 PROCEDURE — E9220 PRA ENDO ANESTHESIA: HCPCS | Mod: PT,,, | Performed by: NURSE ANESTHETIST, CERTIFIED REGISTERED

## 2019-01-07 PROCEDURE — 27201089 HC SNARE, DISP (ANY): Performed by: COLON & RECTAL SURGERY

## 2019-01-07 PROCEDURE — 88305 TISSUE EXAM BY PATHOLOGIST: CPT | Performed by: PATHOLOGY

## 2019-01-07 PROCEDURE — 45385 COLONOSCOPY W/LESION REMOVAL: CPT | Performed by: COLON & RECTAL SURGERY

## 2019-01-07 PROCEDURE — 88305 TISSUE SPECIMEN TO PATHOLOGY - SURGERY: ICD-10-PCS | Mod: 26,,, | Performed by: PATHOLOGY

## 2019-01-07 PROCEDURE — 25000003 PHARM REV CODE 250: Performed by: NURSE ANESTHETIST, CERTIFIED REGISTERED

## 2019-01-07 PROCEDURE — E9220 PRA ENDO ANESTHESIA: ICD-10-PCS | Mod: PT,,, | Performed by: NURSE ANESTHETIST, CERTIFIED REGISTERED

## 2019-01-07 PROCEDURE — 88305 TISSUE EXAM BY PATHOLOGIST: CPT | Mod: 26,,, | Performed by: PATHOLOGY

## 2019-01-07 PROCEDURE — 45385 COLONOSCOPY W/LESION REMOVAL: CPT | Mod: PT,,, | Performed by: COLON & RECTAL SURGERY

## 2019-01-07 PROCEDURE — 45385 PR COLONOSCOPY,REMV LESN,SNARE: ICD-10-PCS | Mod: PT,,, | Performed by: COLON & RECTAL SURGERY

## 2019-01-07 PROCEDURE — 63600175 PHARM REV CODE 636 W HCPCS: Performed by: NURSE ANESTHETIST, CERTIFIED REGISTERED

## 2019-01-07 PROCEDURE — 37000009 HC ANESTHESIA EA ADD 15 MINS: Performed by: COLON & RECTAL SURGERY

## 2019-01-07 PROCEDURE — 25000003 PHARM REV CODE 250: Performed by: NURSE PRACTITIONER

## 2019-01-07 RX ORDER — GLYCOPYRROLATE 0.2 MG/ML
INJECTION INTRAMUSCULAR; INTRAVENOUS
Status: DISCONTINUED | OUTPATIENT
Start: 2019-01-07 | End: 2019-01-07

## 2019-01-07 RX ORDER — SODIUM CHLORIDE 0.9 % (FLUSH) 0.9 %
3 SYRINGE (ML) INJECTION
Status: DISCONTINUED | OUTPATIENT
Start: 2019-01-07 | End: 2019-01-07 | Stop reason: HOSPADM

## 2019-01-07 RX ORDER — PROPOFOL 10 MG/ML
VIAL (ML) INTRAVENOUS CONTINUOUS PRN
Status: DISCONTINUED | OUTPATIENT
Start: 2019-01-07 | End: 2019-01-07

## 2019-01-07 RX ORDER — LIDOCAINE HCL/PF 100 MG/5ML
SYRINGE (ML) INTRAVENOUS
Status: DISCONTINUED | OUTPATIENT
Start: 2019-01-07 | End: 2019-01-07

## 2019-01-07 RX ORDER — PROPOFOL 10 MG/ML
VIAL (ML) INTRAVENOUS
Status: DISCONTINUED | OUTPATIENT
Start: 2019-01-07 | End: 2019-01-07

## 2019-01-07 RX ORDER — SODIUM CHLORIDE 9 MG/ML
INJECTION, SOLUTION INTRAVENOUS CONTINUOUS
Status: DISCONTINUED | OUTPATIENT
Start: 2019-01-07 | End: 2019-01-07 | Stop reason: HOSPADM

## 2019-01-07 RX ADMIN — PROPOFOL 150 MCG/KG/MIN: 10 INJECTION, EMULSION INTRAVENOUS at 11:01

## 2019-01-07 RX ADMIN — PROPOFOL 40 MG: 10 INJECTION, EMULSION INTRAVENOUS at 11:01

## 2019-01-07 RX ADMIN — GLYCOPYRROLATE 0.1 MG: 0.2 INJECTION, SOLUTION INTRAMUSCULAR; INTRAVENOUS at 11:01

## 2019-01-07 RX ADMIN — SODIUM CHLORIDE: 0.9 INJECTION, SOLUTION INTRAVENOUS at 09:01

## 2019-01-07 RX ADMIN — LIDOCAINE HYDROCHLORIDE 40 MG: 20 INJECTION, SOLUTION INTRAVENOUS at 11:01

## 2019-01-07 NOTE — ANESTHESIA PREPROCEDURE EVALUATION
01/07/2019  Nano Sosa is a 63 y.o., female.  Past Medical History:   Diagnosis Date    General anesthetics causing adverse effect in therapeutic use     wakes up hyperventilating/chills    Goiter     Hashimoto's disease     Migraine     Osteopenia     Rectal mass     Ulcerative colitis     Urinary tract infection     Vitamin D deficiency      Past Medical History:   Diagnosis Date    General anesthetics causing adverse effect in therapeutic use     wakes up hyperventilating/chills    Goiter     Hashimoto's disease     Migraine     Osteopenia     Rectal mass     Ulcerative colitis     Urinary tract infection     Vitamin D deficiency      Anesthesia Evaluation    I have reviewed the Patient Summary Reports.     I have reviewed the Medications.     Review of Systems  Anesthesia Hx:  Denies Hx of Anesthetic complications  Denies Family Hx of Anesthesia complications.   Denies Personal Hx of Anesthesia complications.   Hematology/Oncology:  Hematology Normal   Oncology Normal     EENT/Dental:EENT/Dental Normal   Cardiovascular:  Cardiovascular Normal     Pulmonary:  Pulmonary Normal    Renal/:  Renal/ Normal     Hepatic/GI:   PUD, Ulcerative colitis   Musculoskeletal:  Musculoskeletal Normal    Neurological:   Headaches    Endocrine:  Endocrine Normal Hashimoto's disease   Dermatological:  Skin Normal    Psych:  Psychiatric Normal           Physical Exam  General:  Well nourished    Airway/Jaw/Neck:  Airway Findings: Mouth Opening: Normal Tongue: Normal  General Airway Assessment: Adult  TM Distance: Normal, at least 6 cm       Chest/Lungs:  Chest/Lungs Clear    Heart/Vascular:  Heart Findings: Normal Heart murmur: negative Vascular Findings: Normal            Anesthesia Plan  Type of Anesthesia, risks & benefits discussed:  Anesthesia Type:  general  Patient's Preference:   Intra-op  Monitoring Plan:   Intra-op Monitoring Plan Comments:   Post Op Pain Control Plan:   Post Op Pain Control Plan Comments:   Induction:   IV  Beta Blocker:  Patient is not currently on a Beta-Blocker (No further documentation required).       Informed Consent: Patient understands risks and agrees with Anesthesia plan.  Questions answered. Anesthesia consent signed with patient.  ASA Score: 3     Day of Surgery Review of History & Physical: I have interviewed and examined the patient. I have reviewed the patient's H&P dated: 1/7/2019.   H&P update referred to the surgeon.         Ready For Surgery From Anesthesia Perspective.

## 2019-01-07 NOTE — DISCHARGE INSTRUCTIONS
Colonoscopy     A camera attached to a flexible tube with a viewing lens is used to take video pictures.     Colonoscopy is a test to view the inside of your lower digestive tract (colon and rectum). Sometimes it can show the last part of the small intestine (ileum). During the test, small pieces of tissue may be removed for testing. This is called a biopsy. Small growths, such as polyps, may also be removed.   Why is colonoscopy done?  The test is done to help look for colon cancer. And it can help find the source of abdominal pain, bleeding, and changes in bowel habits. It may be needed once a year, depending on factors such as your:  · Age  · Health history  · Family health history  · Symptoms  · Results from any prior colonoscopy  Risks and possible complications  These include:  · Bleeding               · A puncture or tear in the colon   · Risks of anesthesia  · A cancer lesion not being seen  Getting ready   To prepare for the test:  · Talk with your healthcare provider about the risks of the test (see below). Also ask your healthcare provider about alternatives to the test.  · Tell your healthcare provider about any medicines you take. Also tell him or her about any health conditions you may have.  · Make sure your rectum and colon are empty for the test. Follow the diet and bowel prep instructions exactly. If you dont, the test may need to be rescheduled.  · Plan for a friend or family member to drive you home after the test.     Colonoscopy provides an inside view of the entire colon.     You may discuss the results with your doctor right away or at a future visit.  During the test   The test is usually done in the hospital on an outpatient basis. This means you go home the same day. The procedure takes about 30 minutes. During that time:  · You are given relaxing (sedating) medicine through an IV line. You may be drowsy, or fully asleep.  · The healthcare provider will first give you a physical exam to  check for anal and rectal problems.  · Then the anus is lubricated and the scope inserted.  · If you are awake, you may have a feeling similar to needing to have a bowel movement. You may also feel pressure as air is pumped into the colon. Its OK to pass gas during the procedure.  · Biopsy, polyp removal, or other treatments may be done during the test.  After the test   You may have gas right after the test. It can help to try to pass it to help prevent later bloating. Your healthcare provider may discuss the results with you right away. Or you may need to schedule a follow-up visit to talk about the results. After the test, you can go back to your normal eating and other activities. You may be tired from the sedation and need to rest for a few hours.  Date Last Reviewed: 11/1/2016 © 2000-2017 [employer]. [address] [place] [address], [place] [address]. All rights reserved. This information is not intended as a substitute for professional medical care. Always follow your healthcare professional's instructions.

## 2019-01-07 NOTE — ANESTHESIA POSTPROCEDURE EVALUATION
Anesthesia Post Evaluation    Patient: Nano Sosa    Procedure(s) Performed: Procedure(s) (LRB):  COLONOSCOPY (N/A)    Final Anesthesia Type: general  Patient location during evaluation: GI PACU  Patient participation: Yes- Able to Participate  Level of consciousness: awake and alert  Post-procedure vital signs: reviewed and stable  Pain management: adequate  Airway patency: patent  PONV status at discharge: No PONV  Anesthetic complications: no      Cardiovascular status: hemodynamically stable  Respiratory status: unassisted, spontaneous ventilation and room air  Hydration status: euvolemic  Follow-up not needed.        Visit Vitals  /77 (BP Location: Left arm, Patient Position: Lying)   Pulse 66   Temp 36.8 °C (98.2 °F) (Temporal)   Resp 16   SpO2 99%   Breastfeeding? No       Pain/Debi Score: Debi Score: 10 (1/7/2019 11:52 AM)

## 2019-01-07 NOTE — [employer] PATIENT INSTRUCTIONS
Discharge Summary/Instructions after an Endoscopic Procedure  Patient Name: Nano Sosa  Patient MRN: [id number]  Patient YOB: 1955 Monday, January 07, 2019  Vel Self MD  RESTRICTIONS:  During your procedure today, you received medications for sedation.  These   medications may affect your judgment, balance and coordination.  Therefore,   for 24 hours, you have the following restrictions:   - DO NOT drive a car, operate machinery, make legal/financial decisions,   sign important papers or drink alcohol.    ACTIVITY:  Today: no heavy lifting, straining or running due to procedural   sedation/anesthesia.  The following day: return to full activity including work.  DIET:  Eat and drink normally unless instructed otherwise.     TREATMENT FOR COMMON SIDE EFFECTS:  - Mild abdominal pain, nausea, belching, bloating or excessive gas:  rest,   eat lightly and use a heating pad.  - Sore Throat: treat with throat lozenges and/or gargle with warm salt   water.  - Because air was used during the procedure, expelling large amounts of air   from your rectum or belching is normal.  - If a bowel prep was taken, you may not have a bowel movement for 1-3 days.    This is normal.  SYMPTOMS TO WATCH FOR AND REPORT TO YOUR PHYSICIAN:  1. Abdominal pain or bloating, other than gas cramps.  2. Chest pain.  3. Back pain.  4. Signs of infection such as: chills or fever occurring within 24 hours   after the procedure.  5. Rectal bleeding, which would show as bright red, maroon, or black stools.   (A tablespoon of blood from the rectum is not serious, especially if   hemorrhoids are present.)  6. Vomiting.  7. Weakness or dizziness.  GO DIRECTLY TO THE NEAREST EMERGENCY ROOM IF YOU HAVE ANY OF THE FOLLOWING:      Difficulty breathing              Chills and/or fever over 101 F   Persistent vomiting and/or vomiting blood   Severe abdominal pain   Severe chest pain   Black, tarry stools   Bleeding- more than one  tablespoon   Any other symptom or condition that you feel may need urgent attention  Your doctor recommends these additional instructions:  If any biopsies were taken, your doctors clinic will contact you in 1 to 2   weeks with any results.  - Patient has a contact number available for emergencies.  The signs and   symptoms of potential delayed complications were discussed with the   patient.  Return to normal activities tomorrow.  Written discharge   instructions were provided to the patient.   - Discharge patient to home (ambulatory).   - Resume regular diet indefinitely.   - Continue present medications.   - Repeat colonoscopy in 3 - 5 years for surveillance.  For questions, problems or results please call your physician - Vel Self MD at Work:  [phone].  [facility], EMERGENCY ROOM PHONE NUMBER: [phone]  IF A COMPLICATION OR EMERGENCY SITUATION ARISES AND YOU ARE UNABLE TO REACH   YOUR PHYSICIAN - GO DIRECTLY TO THE EMERGENCY ROOM.  Vel Self MD  1/7/2019 11:21:50 AM  This report has been verified and signed electronically.  [employer]

## 2019-01-07 NOTE — H&P
COLONOSCOPY HISTORY AND PE    Procedure : Colonoscopy      INDICATIONS: personal history of colon polyps    Family Hx of CRC: none     Last Colonoscopy:  2016  Findings: none       Past Medical History:   Diagnosis Date    General anesthetics causing adverse effect in therapeutic use     wakes up hyperventilating/chills    Goiter     Hashimoto's disease     Migraine     Osteopenia     Rectal mass     Ulcerative colitis     Urinary tract infection     Vitamin D deficiency      Sedation Problems: NO  Family History   Problem Relation Age of Onset    Rheum arthritis Mother     Migraines Mother     Breast cancer Maternal Aunt     Heart disease Maternal Uncle     Hypertension Maternal Uncle     Colon cancer Maternal Aunt     Anesthesia problems Neg Hx     Diabetes Neg Hx     Ovarian cancer Neg Hx      Fam Hx of Sedation Problems: NO  Social History     Socioeconomic History    Marital status: Single     Spouse name: Not on file    Number of children: Not on file    Years of education: Not on file    Highest education level: Not on file   Social Needs    Financial resource strain: Not on file    Food insecurity - worry: Not on file    Food insecurity - inability: Not on file    Transportation needs - medical: Not on file    Transportation needs - non-medical: Not on file   Occupational History    Not on file   Tobacco Use    Smoking status: Former Smoker     Packs/day: 1.00     Years: 30.00     Pack years: 30.00     Types: Cigarettes     Last attempt to quit: 1999     Years since quittin.3    Smokeless tobacco: Never Used   Substance and Sexual Activity    Alcohol use: Yes     Alcohol/week: 3.6 oz     Types: 4 Glasses of wine, 2 Standard drinks or equivalent per week     Comment: weekends    Drug use: No    Sexual activity: Not Currently     Partners: Male     Birth control/protection: See Surgical Hx   Other Topics Concern    Not on file   Social History Narrative    Not on  file       Review of Systems - Negative except   Respiratory ROS: no dyspnea  Cardiovascular ROS: no exertional chest pain  Gastrointestinal ROS: NO abdominal discomfort,  NO rectal bleeding  Musculoskeletal ROS: no muscular pain  Neurological ROS: no recent stroke    Physical Exam:  Breastfeeding? No   General: no distress  Head: normocephalic  Mallampati Score   Neck: supple, symmetrical, trachea midline  Lungs:  normal respiratory effort  Heart: regular rate and rhythm  Abdomen: soft, non-tender non-distented; bowel sounds normal; no masses,  no organomegaly  Extremities: no cyanosis or edema, or clubbing    ASA:  II    PLAN  COLONOSCOPY.    SedationPlan :MAC    The details of the procedure, the possible need for biopsy or polypectomy and the potential risks including bleeding, perforation, missed polyps were discussed in detail.

## 2019-01-07 NOTE — TRANSFER OF CARE
Anesthesia Transfer of Care Note    Patient: Nano Sosa    Procedure(s) Performed: Procedure(s) (LRB):  COLONOSCOPY (N/A)    Patient location: PACU    Anesthesia Type: general    Transport from OR: Transported from OR on room air with adequate spontaneous ventilation    Post pain: adequate analgesia    Post assessment: no apparent anesthetic complications    Post vital signs: stable    Level of consciousness: responds to stimulation and lethargic    Nausea/Vomiting: no nausea/vomiting    Complications: none    Transfer of care protocol was followed      Last vitals:   Visit Vitals  /82 (BP Location: Left arm, Patient Position: Lying)   Pulse 66   Temp 36.8 °C (98.2 °F) (Temporal)   Resp 16   SpO2 96%   Breastfeeding? No

## 2019-01-07 NOTE — ANESTHESIA RELEASE NOTE
Anesthesia Release from PACU Note    Patient: Nano Sosa    Procedure(s) Performed: Procedure(s) (LRB):  COLONOSCOPY (N/A)    Anesthesia type: general    Post pain: Adequate analgesia    Post assessment: no apparent anesthetic complications and tolerated procedure well    Last Vitals:   Visit Vitals  /77 (BP Location: Left arm, Patient Position: Lying)   Pulse 66   Temp 36.8 °C (98.2 °F) (Temporal)   Resp 16   SpO2 99%   Breastfeeding? No       Post vital signs: stable    Level of consciousness: awake and alert     Nausea/Vomiting: no nausea/no vomiting    Complications: none    Airway Patency: patent    Respiratory: unassisted, spontaneous ventilation, room air    Cardiovascular: stable and blood pressure at baseline    Hydration: euvolemic

## 2019-01-14 ENCOUNTER — TELEPHONE (OUTPATIENT)
Dept: ENDOSCOPY | Facility: HOSPITAL | Age: 64
End: 2019-01-14

## 2019-03-21 NOTE — TELEPHONE ENCOUNTER
----- Message from Sammi Archibald MD sent at 8/11/2018  3:28 PM CDT -----  Please notify patient that culture result returned as expected. Follow up with PCP with any problems      Left message on voicemail   
Mom Megan Jo

## 2020-02-29 ENCOUNTER — HOSPITAL ENCOUNTER (EMERGENCY)
Facility: HOSPITAL | Age: 65
Discharge: HOME | End: 2020-02-29
Attending: ANESTHESIOLOGY
Payer: COMMERCIAL

## 2020-02-29 VITALS
SYSTOLIC BLOOD PRESSURE: 159 MMHG | HEIGHT: 67 IN | HEART RATE: 60 BPM | WEIGHT: 205 LBS | DIASTOLIC BLOOD PRESSURE: 72 MMHG | RESPIRATION RATE: 20 BRPM | TEMPERATURE: 99 F | OXYGEN SATURATION: 97 % | BODY MASS INDEX: 32.18 KG/M2

## 2020-02-29 DIAGNOSIS — T14.8XXA CONTUSION OF SOFT TISSUE: ICD-10-CM
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
## 2020-02-29 DIAGNOSIS — S02.2XXA CLOSED FRACTURE OF NASAL BONE, INITIAL ENCOUNTER: Primary | ICD-10-CM

## 2020-02-29 DIAGNOSIS — S01.21XA LACERATION OF NOSE, INITIAL ENCOUNTER: ICD-10-CM

## 2020-02-29 PROCEDURE — 99284 EMERGENCY DEPT VISIT MOD MDM: CPT | Mod: 25

## 2020-02-29 PROCEDURE — 90715 TDAP VACCINE 7 YRS/> IM: CPT | Performed by: PHYSICIAN ASSISTANT

## 2020-02-29 PROCEDURE — 99284 EMERGENCY DEPT VISIT MOD MDM: CPT | Mod: ,,, | Performed by: EMERGENCY MEDICINE

## 2020-02-29 PROCEDURE — 90471 IMMUNIZATION ADMIN: CPT | Performed by: PHYSICIAN ASSISTANT

## 2020-02-29 PROCEDURE — 63600175 PHARM REV CODE 636 W HCPCS: Performed by: PHYSICIAN ASSISTANT

## 2020-02-29 PROCEDURE — 99284 PR EMERGENCY DEPT VISIT,LEVEL IV: ICD-10-PCS | Mod: ,,, | Performed by: EMERGENCY MEDICINE

## 2020-02-29 PROCEDURE — 25000003 PHARM REV CODE 250: Performed by: PHYSICIAN ASSISTANT

## 2020-02-29 RX ORDER — ACETAMINOPHEN 325 MG/1
650 TABLET ORAL
Status: COMPLETED | OUTPATIENT
Start: 2020-02-29 | End: 2020-02-29

## 2020-02-29 RX ADMIN — ACETAMINOPHEN 650 MG: 325 TABLET ORAL at 07:02

## 2020-02-29 RX ADMIN — CLOSTRIDIUM TETANI TOXOID ANTIGEN (FORMALDEHYDE INACTIVATED), CORYNEBACTERIUM DIPHTHERIAE TOXOID ANTIGEN (FORMALDEHYDE INACTIVATED), BORDETELLA PERTUSSIS TOXOID ANTIGEN (GLUTARALDEHYDE INACTIVATED), BORDETELLA PERTUSSIS FILAMENTOUS HEMAGGLUTININ ANTIGEN (FORMALDEHYDE INACTIVATED), BORDETELLA PERTUSSIS PERTACTIN ANTIGEN, AND BORDETELLA PERTUSSIS FIMBRIAE 2/3 ANTIGEN 0.5 ML: 5; 2; 2.5; 5; 3; 5 INJECTION, SUSPENSION INTRAMUSCULAR at 10:02

## 2020-02-29 NOTE — ED TRIAGE NOTES
Nano Sosa, a 64 y.o. female presents to the ED via personal transportation with CC trip and fall while walking her dogs, striking face on cement. C/o headache and facial pain.  Swelling and abrasions noted to nose, no active bleeding. Patient denies LOC. Denies dizziness or vision changes. Also c/o pain to right 4th digit. No obvious swelling or deformity noted, skin intact, full active ROM tolerated. Patient had no other complaints on arrival.

## 2020-02-29 NOTE — ED PROVIDER NOTES
Encounter Date: 2/29/2020       History     Chief Complaint   Patient presents with    Fall     tripped and fell, hit face , lac to nose bleeding controlled, no loc not on blood thinners     7:37 AM  Patient is a 64-year-old female who presents the ED with superficial nasal laceration, pain, and swelling status post fall.  States that she tripped over the sidewalk near her house when she was walking her dog.  Denies any prodromal symptoms such as headache, dizziness, lightheadedness, chest pain, shortness of breath.  Denies any LOC.  Is complaining of 8/10 pain to her nose.  Denies any headache, blurred vision, neck pain, chest pain, back pain, abdominal pain, lower extremity pain. Has pain to her right index finger, but attributes this to clipping her nails too deep prior to arrival.  Does not take baby aspirin or blood thinners.  Has been applying ice since she left her house.        Review of patient's allergies indicates:   Allergen Reactions    Penicillins Shortness Of Breath     Throat swelling    Sulfa (sulfonamide antibiotics) Hives and Rash    Codeine Itching     Hallucinations, sweats    Erythromycin Itching and Swelling    Tessalon [benzonatate] Swelling    Unable to assess      TUNA--Flushing of skin     Past Medical History:   Diagnosis Date    General anesthetics causing adverse effect in therapeutic use     wakes up hyperventilating/chills    Goiter     Hashimoto's disease     Migraine     Osteopenia     Rectal mass     Ulcerative colitis     Urinary tract infection     Vitamin D deficiency      Past Surgical History:   Procedure Laterality Date    BUNIONECTOMY      right    colon sx      COLONOSCOPY N/A 12/19/2016    Procedure: COLONOSCOPY;  Surgeon: Alli Peña MD;  Location: [facility] ([address] [place] [address]);  Service: Endoscopy;  Laterality: N/A;    COLONOSCOPY N/A 1/7/2019    Procedure: COLONOSCOPY;  Surgeon: Vel Self MD;  [facility]  Service:  Endoscopy;  Laterality: N/A;  Pt requests Dr. Peña, Pt ok with Dr. Aramis escobar  2014    FACIAL COSMETIC SURGERY      TOTAL ABDOMINAL HYSTERECTOMY      ISIDRO     Family History   Problem Relation Age of Onset    Rheum arthritis Mother     Migraines Mother     Breast cancer Maternal Aunt     Heart disease Maternal Uncle     Hypertension Maternal Uncle     Colon cancer Maternal Aunt     Anesthesia problems Neg Hx     Diabetes Neg Hx     Ovarian cancer Neg Hx      Social History     Tobacco Use    Smoking status: Former Smoker     Packs/day: 1.00     Years: 30.00     Pack years: 30.00     Types: Cigarettes     Last attempt to quit: 1999     Years since quittin.5    Smokeless tobacco: Never Used   Substance Use Topics    Alcohol use: Yes     Alcohol/week: 6.0 standard drinks     Types: 4 Glasses of wine, 2 Standard drinks or equivalent per week     Comment: weekends    Drug use: No     Review of Systems   Constitutional: Negative for chills and fever.   HENT: Negative for sore throat.    Eyes: Negative for photophobia.   Respiratory: Negative for shortness of breath.    Cardiovascular: Negative for chest pain.   Gastrointestinal: Negative for nausea.   Genitourinary: Negative for dysuria.   Musculoskeletal: Positive for arthralgias. Negative for back pain.   Skin: Positive for color change and wound. Negative for rash.   Neurological: Negative for weakness.   Hematological: Does not bruise/bleed easily.       Physical Exam     Initial Vitals [20 0708]   BP Pulse Resp Temp SpO2   (!) 192/93 80 18 98.7 °F (37.1 °C) 97 %      MAP       --         Physical Exam    Vitals reviewed.  Constitutional: She appears well-developed and well-nourished. She is not diaphoretic. No distress.   HENT:   Head: Normocephalic.   Right Ear: Tympanic membrane and ear canal normal.   Left Ear: Tympanic membrane and ear canal normal.   Nose: Mucosal edema, sinus tenderness and nasal deformity  present. No rhinorrhea. Epistaxis (hemostasis acheived) is observed.  No foreign bodies.   No nasal hematoma noted.    Eyes: Conjunctivae and EOM are normal. Pupils are equal, round, and reactive to light. Right conjunctiva is not injected. Right conjunctiva has no hemorrhage. Left conjunctiva is not injected. Left conjunctiva has no hemorrhage. Pupils are equal.   L periorbital ecchymosis with edema.    Neck: Normal range of motion and full passive range of motion without pain. No spinous process tenderness and no muscular tenderness present. Normal range of motion present. No neck rigidity.   Cardiovascular: Normal rate.   Pulmonary/Chest: No respiratory distress. She exhibits no tenderness and no bony tenderness.   Abdominal: Soft. She exhibits no distension. There is no tenderness. There is no rebound and no guarding.   Musculoskeletal: Normal range of motion.        Right hand: Normal. She exhibits normal range of motion, no tenderness, no bony tenderness and no swelling. Normal sensation noted. Normal strength noted.   No C, T, or L spinous process tenderness.  Full range of motion of bilateral upper and lower extremities with strength intact.  No abnormality in gait.   Neurological: She is alert and oriented to person, place, and time. She has normal strength. No sensory deficit.   Skin: Skin is warm and dry. Laceration noted. No erythema. No pallor.   Superficial 1 cm laceration to bridge of nose.   Psychiatric: She has a normal mood and affect. Her behavior is normal. Judgment and thought content normal.         ED Course   Procedures  Labs Reviewed - No data to display       Imaging Results          CT Head Without Contrast (Final result)  Result time 02/29/20 09:23:13    Final result by David Goins MD (02/29/20 09:23:13)                 Impression:      No acute intracranial abnormality.    CT maxillofacial dictated separately.    Electronically signed by resident: Buck  Elissa  Date:    02/29/2020  Time:    08:47    Electronically signed by: David Goins  Date:    02/29/2020  Time:    09:23             Narrative:    EXAMINATION:  CT HEAD WITHOUT CONTRAST    CLINICAL HISTORY:  Head trauma, minor, GCS>=13, NOC/NEXUS/CCR positive, first study;    TECHNIQUE:  Low dose axial CT images obtained throughout the head without intravenous contrast. Sagittal and coronal reconstructions were performed.    COMPARISON:  CT head 03/03/2016, 06/10/2011    MRI brain 05/24/2012    FINDINGS:  No acute intracranial hemorrhage, extra-axial collection, midline shift, or mass effect.  No evidence for major vascular distribution infarct.  Ventricles are normal in size.  No acute calvarial abnormality.  Smooth cortical thickening of the posterior calvarium measuring 2.7 cm, possible osteoma.    CT maxillofacial dictated separately.                               CT Maxillofacial Without Contrast (Final result)  Result time 02/29/20 09:46:44    Final result by David Goins MD (02/29/20 09:46:44)                 Impression:      Nondisplaced to minimally displaced right nasal bone fractures with overlying soft tissue swelling of the nasal bridge.    Left maxillary sinus disease.    Electronically signed by resident: Buck Bowers  Date:    02/29/2020  Time:    08:54    Electronically signed by: David Goins  Date:    02/29/2020  Time:    09:46             Narrative:    EXAMINATION:  CT MAXILLOFACIAL WITHOUT CONTRAST    CLINICAL HISTORY:  Nasal fracture, suspected    TECHNIQUE:  Low dose CT images throughout the region of the facial bones.  Axial, sagittal and coronal reformations were obtained.  Contrast was not administered.    COMPARISON:  CT Atonarptronic sinuses 10/21/2014    FINDINGS:  There are nondisplaced to minimally displaced right nasal bone fractures noting minimal cortical step-off of the more posterior fracture line (series 2, image 69).  Overlying soft tissue swelling of the nasal  bridge.  No dependent paranasal sinus hemorrhage.  There is polypoid, near circumferential mucosal thickening of the left maxillary sinus.    Orbital walls and globes appear intact.  Bilateral mandibular condyles are well-seated.  Artifact related to dental amalgam.  Cervical spondylosis.                                 Medical Decision Making:   History:   Old Medical Records: I decided to obtain old medical records.  Old Records Summarized: records from clinic visits and records from previous admission(s).  Initial Assessment:   Patient is a 64-year-old female who presents the ED with superficial nasal laceration, pain, and swelling status post mechanical fall. No LOC.  Differential Diagnosis:   Includes but is not limited to soft tissue contusion, bony contusion, hematoma unlikely given none noted on exam, superficial laceration that does not warrant any repair this time, nasal fracture, less likely intracerebral bleed. No signs of basilar skull fracture. Unlikely orbital floor fracture given intact EOMs.  Clinical Tests:   Radiological Study: Ordered and Reviewed  ED Management:  Will obtain CT maxillofacial and head, give oral acetaminophen, apply ice, and continue to monitor.    CT head with no intracranial abnormality.  CT maxillofacial shows nondisplaced to minimally displaced right nasal bone fracture with overlying soft tissue swelling of the nasal bridge.  Left maxillary sinus disease noted. Orbital walls and globes intact.    Patient updated with results.  She has not had any episodes of epistasis here.  She continues to apply ice.  Will treat patient for nasal fracture and soft tissue contusion.  Discussed etiology.  Patient was given good nasal fracture precautions such as avoiding any other injury, sneezing, alcohol, warm beverages. Continue regularly ice application for edema and pain relief.  Continue OTC ibuprofen and/or acetaminophen.  Ambulatory referral placed to ENT for follow up.  Return to ED  precautions given, patient voiced her understanding. All questions were answered.  Patient comfortable with plan and stable discharge.      I have reviewed patient's chart and discussed this case with my supervising MD.     Yaima Lindsay PA-C  Emergent Department  [facility]  Spectralink #[id number] or #[id number]                                   Clinical Impression:       ICD-10-CM ICD-9-CM   1. Closed fracture of nasal bone, initial encounter S02.2XXA 802.0   2. Fall, initial encounter W19.XXXA E888.9   3. Laceration of nose, initial encounter S01.21XA 873.20   4. Contusion of soft tissue T14.8XXA 924.9         Disposition:   Disposition: Discharged  Condition: Stable     ED Disposition Condition    Discharge Stable        ED Prescriptions     None        Follow-up Information     Follow up With Specialties Details Why Contact Info Additional Information    [facility] - Otorhinolaryngology Otolaryngology Schedule an appointment as soon as possible for a visit   [address] [facility] [address] [phone] [facility] [place] - 4th Floor    Eva Shah MD Internal Medicine, Psychiatry Schedule an appointment as soon as possible for a visit   [address] [facility] [address]  [phone]       [facility] Emergency Medicine  If symptoms worsen [facility] [facility] [id number] [phone]                                      Yaima Lindsay PA-C  02/29/20 1250

## 2020-02-29 NOTE — DISCHARGE INSTRUCTIONS
Home care  Use an ice pack on your nose for no more than 15 to 20 minutes at a time. Do this every 1 to 2 hours for the first 24 to 48 hours. Then use the ice as needed to ease pain and swelling. To make an ice pack, put ice cubes in a plastic bag that seals at the top. Wrap the bag in a clean, thin towel or cloth. Never put ice or an ice pack directly on the skin.  You may use over-the-counter pain medicine to control pain (acetaminophen/Tylenol 650 every 6 hr and/or ibuprofen 600 mg every 6 hr with meals), unless another medicine was prescribed. If you have chronic liver or kidney disease, talk with your provider before using this medicine.  Dont drink alcohol or hot liquids for the next 2 days. Alcohol and hot liquids can dilate blood vessels in your nose. This can cause bleeding.  Dont blow your nose for the first 2 days. Then, do so gently so you don't cause bleeding.  Dont play contact sports in the next 6 weeks unless you can protect your nose from getting injured again. You can wear a special custom-fitted plastic face mask to protect your nose.    No future appointments.    Our goal in the emergency department is to always give you outstanding care and exceptional service. You may receive a survey by mail or e-mail in the next week regarding your experience in our ED. We would greatly appreciate your completing and returning the survey. Your feedback provides us with a way to recognize our staff who give very good care and it helps us learn how to improve when your experience was below our aspiration of excellence.

## 2020-03-02 ENCOUNTER — PATIENT MESSAGE (OUTPATIENT)
Dept: OTOLARYNGOLOGY | Facility: CLINIC | Age: 65
End: 2020-03-02

## 2020-03-02 ENCOUNTER — PATIENT OUTREACH (OUTPATIENT)
Dept: ADMINISTRATIVE | Facility: OTHER | Age: 65
End: 2020-03-02

## 2020-03-03 ENCOUNTER — OFFICE VISIT (OUTPATIENT)
Dept: OTOLARYNGOLOGY | Facility: CLINIC | Age: 65
End: 2020-03-03
Payer: COMMERCIAL

## 2020-03-03 VITALS
TEMPERATURE: 98 F | WEIGHT: 206 LBS | DIASTOLIC BLOOD PRESSURE: 79 MMHG | HEART RATE: 71 BPM | HEIGHT: 67 IN | BODY MASS INDEX: 32.33 KG/M2 | SYSTOLIC BLOOD PRESSURE: 127 MMHG

## 2020-03-03 DIAGNOSIS — S02.2XXA CLOSED FRACTURE OF NASAL BONE, INITIAL ENCOUNTER: Primary | ICD-10-CM

## 2020-03-03 PROCEDURE — 99204 PR OFFICE/OUTPT VISIT, NEW, LEVL IV, 45-59 MIN: ICD-10-PCS | Mod: S$GLB,,, | Performed by: OTOLARYNGOLOGY

## 2020-03-03 PROCEDURE — 99204 OFFICE O/P NEW MOD 45 MIN: CPT | Mod: S$GLB,,, | Performed by: OTOLARYNGOLOGY

## 2020-03-03 NOTE — PROGRESS NOTES
Chart reviewed.   Immunizations: Triggered Imm Registry     Orders placed: n/a  Upcoming appts to satisfy ROSIO topics: n/a

## 2020-03-03 NOTE — PROGRESS NOTES
Ms. Sosa     Vitals:    20 1359   BP: 127/79   Pulse: 71   Temp: 97.7 °F (36.5 °C)       Chief Complaint:  Fracture (nose)       HPI:  Mrs. Sosa is a 64-year-old white female past sinus patient of mine having undergone a Fess in 2014.  She has been using her rinses diligently and has had only 1 and occasionally 2 sinus infections annually since then.  Three days ago she was walking in her neighborhood for exercise when she fell striking her nose. She was evaluated in the ED with findings of nondisplaced nasal fracture.  She was referred for evaluation.    Review of Systems:  Constitutional:   weight loss or weight gain: Negative  Allergy/Immunologic:   Negative  Nasal Congestion/Obstruction:   Negative  Nosebleeds:   Positive after trauma  Sinus infections:   1-2 annually  Headache/Facial Pain:   Positive after trauma  Snoring/ABHILASH:   Negative  Throat: Infections/Pain:   Negative  Hoarseness/Speech Disturbance:   Negative  Trauma Hx:  Nasal trauma as above    Cardiovascular:  M/I Angina: Negative  Hypertension: Negative  Endocrine:    DM/Steroids: Negative  GI:   Dysphagia/Reflux: Negative  :   GYN Pregnancy: Negative  Renal:   Dialysis: Negative  Lymphatic:   Neck Mass/Lymphadenopathy: Negative  Muscoloskeletal:   Negative  Hematologic:   Bleeding Disorders/Anemia: Negative  Neurologic:    Cranial/Neuralgia: Negative  Pulmonary:   Asthma/SOB/Cough: Negative  Skin Disorders: Negative    Past Medical/Surgical/Family/Social History:    ENT Surgery: Negative  Occupational Exposure: Negative   Problems: Negative  Cancer: Negative    Past Family History:   Family history of Cancer: Negative    Past Social History:   Tobacco: Nonsmoker   Alcohol: Social Drinker      Allergies and medications: Reviewed per med card.    Physical Examination:  Ears:   External auditory canals:  Clear   Hearing: Grossly intact   Tympanic Membranes: Clear  Nose:   External:  Superficial abrasion of the  nasal dorsum as well as nasal tip.  No palpable step deformities.   Intranasal:  Septum appears straight with 1 to 2+ turbinates and no evidence of septal hematoma.  Nasal airway is clear.  Mouth:   Intraorally: Lips, teeth, and gums: Normal   Oropharynx: Normal   Mucosa: Normal   Tongue: Normal  Throat:      Palate: Normal palate with elevation   Tonsils: Normal   Posterior Pharynx: Normal  Fiberoptic exam: Not performed  Head/Face:     Inspection: Normal and atraumatic   Palpation/Percussion: Non tender   Facial strength: Normal and symmetric   Salivary glands: Normal  Neck: Supple  Thyroid: No masses  Lymphatics: No nodes  Respiratory:   Effort: Normal  Eyes:   Ocular Mobility: Normal   Vision: Grossly intact  Neuro/Psych:   Cranial Nerves: Grossly Intact   Orientation: Normal   Mood/Affect: Normal      Assessment/Plan:  I have discussed my findings with her in detail as well as my recommendations for treatment.  I have reviewed the maxillofacial CT scans obtained in the ED thoroughly.  I do not see any need for surgical intervention at this time. She is advised to use bacitracin ointment on her nasal abrasions, to avoid nose blowing, and to use nasal saline spray and continue to gently use her sinus rinses.  She will return to clinic p.r.n. any problems.

## 2020-04-21 ENCOUNTER — PATIENT MESSAGE (OUTPATIENT)
Dept: OTOLARYNGOLOGY | Facility: CLINIC | Age: 65
End: 2020-04-21

## 2020-04-22 ENCOUNTER — PATIENT MESSAGE (OUTPATIENT)
Dept: OTOLARYNGOLOGY | Facility: CLINIC | Age: 65
End: 2020-04-22

## 2020-04-29 ENCOUNTER — PATIENT MESSAGE (OUTPATIENT)
Dept: OTOLARYNGOLOGY | Facility: CLINIC | Age: 65
End: 2020-04-29

## 2020-10-12 ENCOUNTER — CLINICAL SUPPORT (OUTPATIENT)
Dept: INTERNAL MEDICINE | Facility: CLINIC | Age: 65
End: 2020-10-12

## 2020-10-12 PROCEDURE — 90471 IMMUNIZATION ADMIN: CPT | Mod: PBBFAC

## 2020-10-12 PROCEDURE — 90472 IMMUNIZATION ADMIN EACH ADD: CPT | Mod: PBBFAC

## 2020-10-12 PROCEDURE — G0009 ADMIN PNEUMOCOCCAL VACCINE: HCPCS | Mod: PBBFAC

## 2021-04-16 ENCOUNTER — PATIENT MESSAGE (OUTPATIENT)
Dept: RESEARCH | Facility: HOSPITAL | Age: 66
End: 2021-04-16

## 2021-08-12 ENCOUNTER — PATIENT MESSAGE (OUTPATIENT)
Dept: PSYCHIATRY | Facility: CLINIC | Age: 66
End: 2021-08-12

## 2021-08-14 NOTE — PATIENT INSTRUCTIONS
Take Doxycycline twice a day for 10 days take with food avoid calcium/ dairy for 4 hours before or after as the calcium can interfere with the absorption of the antibiotic    Use Astelin nasal spray daily    Take OTC Claritin/ Allegra or zyrtec when pollen levels are high to help avoid some of your symptoms    Call for any concerns      
Opt out

## 2021-08-18 ENCOUNTER — TELEPHONE (OUTPATIENT)
Dept: INTERNAL MEDICINE | Facility: CLINIC | Age: 66
End: 2021-08-18

## 2021-08-18 ENCOUNTER — OFFICE VISIT (OUTPATIENT)
Dept: INTERNAL MEDICINE | Facility: CLINIC | Age: 66
End: 2021-08-18
Payer: MEDICARE

## 2021-08-18 DIAGNOSIS — U07.1 COVID-19 VIRUS INFECTION: Primary | ICD-10-CM

## 2021-08-18 PROCEDURE — 99213 OFFICE O/P EST LOW 20 MIN: CPT | Mod: 95,,, | Performed by: NURSE PRACTITIONER

## 2021-08-18 PROCEDURE — 99213 PR OFFICE/OUTPT VISIT, EST, LEVL III, 20-29 MIN: ICD-10-PCS | Mod: 95,,, | Performed by: NURSE PRACTITIONER

## 2021-08-25 ENCOUNTER — PATIENT MESSAGE (OUTPATIENT)
Dept: INTERNAL MEDICINE | Facility: CLINIC | Age: 66
End: 2021-08-25

## 2021-08-25 ENCOUNTER — HOSPITAL ENCOUNTER (OUTPATIENT)
Dept: RADIOLOGY | Facility: HOSPITAL | Age: 66
Discharge: HOME | End: 2021-08-25
Attending: NURSE PRACTITIONER
Payer: MEDICARE

## 2021-08-25 DIAGNOSIS — U07.1 COVID-19 VIRUS INFECTION: Primary | ICD-10-CM

## 2021-08-25 DIAGNOSIS — U07.1 COVID-19 VIRUS INFECTION: ICD-10-CM

## 2021-08-25 DIAGNOSIS — R52 PAINFUL COUGH: ICD-10-CM

## 2021-08-25 DIAGNOSIS — R05.8 PAINFUL COUGH: Primary | ICD-10-CM

## 2021-08-25 DIAGNOSIS — R52 PAINFUL COUGH: Primary | ICD-10-CM

## 2021-08-25 DIAGNOSIS — R05.8 PAINFUL COUGH: ICD-10-CM

## 2021-08-25 DIAGNOSIS — R91.8 ABNORMALITY OF LUNG ON CHEST X-RAY: ICD-10-CM

## 2021-08-25 PROCEDURE — 71046 X-RAY EXAM CHEST 2 VIEWS: CPT | Mod: 26,,, | Performed by: RADIOLOGY

## 2021-08-25 PROCEDURE — 71046 X-RAY EXAM CHEST 2 VIEWS: CPT | Mod: TC

## 2021-08-25 PROCEDURE — 71046 XR CHEST PA AND LATERAL: ICD-10-PCS | Mod: 26,,, | Performed by: RADIOLOGY

## 2021-08-26 ENCOUNTER — OFFICE VISIT (OUTPATIENT)
Dept: PULMONOLOGY | Facility: CLINIC | Age: 66
End: 2021-08-26
Payer: MEDICARE

## 2021-08-26 VITALS
HEART RATE: 56 BPM | DIASTOLIC BLOOD PRESSURE: 82 MMHG | SYSTOLIC BLOOD PRESSURE: 126 MMHG | OXYGEN SATURATION: 97 % | WEIGHT: 203.94 LBS | HEIGHT: 67 IN | BODY MASS INDEX: 32.01 KG/M2

## 2021-08-26 DIAGNOSIS — R05.8 PAINFUL COUGH: ICD-10-CM

## 2021-08-26 DIAGNOSIS — U07.1 COVID-19 VIRUS INFECTION: ICD-10-CM

## 2021-08-26 DIAGNOSIS — R91.8 ABNORMALITY OF LUNG ON CHEST X-RAY: ICD-10-CM

## 2021-08-26 DIAGNOSIS — R52 PAINFUL COUGH: ICD-10-CM

## 2021-08-26 PROCEDURE — 99999 PR PBB SHADOW E&M-EST. PATIENT-LVL III: ICD-10-PCS | Mod: PBBFAC,CR,, | Performed by: NURSE PRACTITIONER

## 2021-08-26 PROCEDURE — 99213 OFFICE O/P EST LOW 20 MIN: CPT | Mod: PBBFAC | Performed by: NURSE PRACTITIONER

## 2021-08-26 PROCEDURE — 99999 PR PBB SHADOW E&M-EST. PATIENT-LVL III: CPT | Mod: PBBFAC,CR,, | Performed by: NURSE PRACTITIONER

## 2021-08-26 PROCEDURE — 99215 OFFICE O/P EST HI 40 MIN: CPT | Mod: CR,S$PBB,ICN, | Performed by: NURSE PRACTITIONER

## 2021-08-26 PROCEDURE — 99215 PR OFFICE/OUTPT VISIT, EST, LEVL V, 40-54 MIN: ICD-10-PCS | Mod: CR,S$PBB,ICN, | Performed by: NURSE PRACTITIONER

## 2021-08-26 RX ORDER — ALBUTEROL SULFATE 90 UG/1
2 AEROSOL, METERED RESPIRATORY (INHALATION) EVERY 6 HOURS PRN
Qty: 8 G | Refills: 1 | Status: SHIPPED | OUTPATIENT
Start: 2021-08-26 | End: 2022-05-18 | Stop reason: ALTCHOICE

## 2021-08-27 ENCOUNTER — PATIENT MESSAGE (OUTPATIENT)
Dept: PULMONOLOGY | Facility: CLINIC | Age: 66
End: 2021-08-27

## 2021-08-31 ENCOUNTER — PATIENT MESSAGE (OUTPATIENT)
Dept: INTERNAL MEDICINE | Facility: CLINIC | Age: 66
End: 2021-08-31

## 2021-09-28 ENCOUNTER — PATIENT MESSAGE (OUTPATIENT)
Dept: INTERNAL MEDICINE | Facility: CLINIC | Age: 66
End: 2021-09-28

## 2021-10-06 ENCOUNTER — HOSPITAL ENCOUNTER (OUTPATIENT)
Dept: RADIOLOGY | Facility: HOSPITAL | Age: 66
Discharge: HOME | End: 2021-10-06
Attending: NURSE PRACTITIONER
Payer: MEDICARE

## 2021-10-06 DIAGNOSIS — U07.1 COVID-19 VIRUS INFECTION: ICD-10-CM

## 2021-10-06 DIAGNOSIS — R91.8 ABNORMALITY OF LUNG ON CHEST X-RAY: ICD-10-CM

## 2021-10-06 PROCEDURE — 71046 XR CHEST PA AND LATERAL: ICD-10-PCS | Mod: 26,CR,, | Performed by: RADIOLOGY

## 2021-10-06 PROCEDURE — 71046 X-RAY EXAM CHEST 2 VIEWS: CPT | Mod: 26,CR,, | Performed by: RADIOLOGY

## 2021-10-06 PROCEDURE — 71046 X-RAY EXAM CHEST 2 VIEWS: CPT | Mod: TC

## 2021-10-06 RX ORDER — IVERMECTIN 3 MG/1
TABLET ORAL
COMMUNITY
Start: 2021-08-23 | End: 2021-10-07 | Stop reason: ALTCHOICE

## 2021-10-06 RX ORDER — ZOSTER VACCINE RECOMBINANT, ADJUVANTED 50 MCG/0.5
0.5 KIT INTRAMUSCULAR ONCE
Qty: 1 EACH | Refills: 0 | Status: CANCELLED | OUTPATIENT
Start: 2021-10-06 | End: 2021-10-06

## 2021-10-07 ENCOUNTER — OFFICE VISIT (OUTPATIENT)
Dept: INTERNAL MEDICINE | Facility: CLINIC | Age: 66
End: 2021-10-07
Payer: MEDICARE

## 2021-10-07 ENCOUNTER — IMMUNIZATION (OUTPATIENT)
Dept: INTERNAL MEDICINE | Facility: CLINIC | Age: 66
End: 2021-10-07
Payer: MEDICARE

## 2021-10-07 VITALS
DIASTOLIC BLOOD PRESSURE: 70 MMHG | BODY MASS INDEX: 32.42 KG/M2 | HEART RATE: 72 BPM | WEIGHT: 206.56 LBS | HEIGHT: 67 IN | SYSTOLIC BLOOD PRESSURE: 102 MMHG

## 2021-10-07 DIAGNOSIS — Z12.31 ENCOUNTER FOR SCREENING MAMMOGRAM FOR MALIGNANT NEOPLASM OF BREAST: ICD-10-CM

## 2021-10-07 DIAGNOSIS — R63.5 ABNORMAL WEIGHT GAIN: ICD-10-CM

## 2021-10-07 DIAGNOSIS — Z23 NEED FOR SHINGLES VACCINE: ICD-10-CM

## 2021-10-07 DIAGNOSIS — Z76.89 ENCOUNTER TO ESTABLISH CARE WITH NEW DOCTOR: ICD-10-CM

## 2021-10-07 DIAGNOSIS — Z23 NEED FOR 23-POLYVALENT PNEUMOCOCCAL POLYSACCHARIDE VACCINE: ICD-10-CM

## 2021-10-07 DIAGNOSIS — E55.9 MILD VITAMIN D DEFICIENCY: Primary | ICD-10-CM

## 2021-10-07 DIAGNOSIS — Z12.11 COLON CANCER SCREENING: ICD-10-CM

## 2021-10-07 DIAGNOSIS — Z13.220 SCREENING CHOLESTEROL LEVEL: ICD-10-CM

## 2021-10-07 DIAGNOSIS — Z01.89 ROUTINE LAB DRAW: ICD-10-CM

## 2021-10-07 DIAGNOSIS — E66.9 OBESITY (BMI 30-39.9): ICD-10-CM

## 2021-10-07 DIAGNOSIS — Z23 NEED FOR VACCINATION: Primary | ICD-10-CM

## 2021-10-07 DIAGNOSIS — Z86.010 HISTORY OF COLON POLYPS: ICD-10-CM

## 2021-10-07 DIAGNOSIS — R07.1 CHEST PAIN ON BREATHING: ICD-10-CM

## 2021-10-07 DIAGNOSIS — Z23 NEED FOR INFLUENZA VACCINATION: ICD-10-CM

## 2021-10-07 DIAGNOSIS — Z78.0 POSTMENOPAUSAL ESTROGEN DEFICIENCY: ICD-10-CM

## 2021-10-07 DIAGNOSIS — Z86.39 HISTORY OF HASHIMOTO THYROIDITIS: ICD-10-CM

## 2021-10-07 PROCEDURE — 99214 OFFICE O/P EST MOD 30 MIN: CPT | Mod: S$PBB,,, | Performed by: NURSE PRACTITIONER

## 2021-10-07 PROCEDURE — 99999 PR PBB SHADOW E&M-EST. PATIENT-LVL IV: CPT | Mod: PBBFAC,,, | Performed by: NURSE PRACTITIONER

## 2021-10-07 PROCEDURE — 93005 ELECTROCARDIOGRAM TRACING: CPT | Mod: PBBFAC | Performed by: INTERNAL MEDICINE

## 2021-10-07 PROCEDURE — 93010 ELECTROCARDIOGRAM REPORT: CPT | Mod: S$PBB,,, | Performed by: INTERNAL MEDICINE

## 2021-10-07 PROCEDURE — 99999 PR PBB SHADOW E&M-EST. PATIENT-LVL IV: ICD-10-PCS | Mod: PBBFAC,,, | Performed by: NURSE PRACTITIONER

## 2021-10-07 PROCEDURE — 99214 OFFICE O/P EST MOD 30 MIN: CPT | Mod: PBBFAC | Performed by: NURSE PRACTITIONER

## 2021-10-07 PROCEDURE — 93010 EKG 12-LEAD: ICD-10-PCS | Mod: S$PBB,,, | Performed by: INTERNAL MEDICINE

## 2021-10-07 PROCEDURE — 99214 PR OFFICE/OUTPT VISIT, EST, LEVL IV, 30-39 MIN: ICD-10-PCS | Mod: S$PBB,,, | Performed by: NURSE PRACTITIONER

## 2021-10-07 PROCEDURE — 0003A COVID-19, MRNA, LNP-S, PF, 30 MCG/0.3 ML DOSE VACCINE: CPT | Mod: PBBFAC,CV19

## 2021-10-07 PROCEDURE — 90471 IMMUNIZATION ADMIN: CPT | Mod: PBBFAC

## 2021-10-07 PROCEDURE — 91300 COVID-19, MRNA, LNP-S, PF, 30 MCG/0.3 ML DOSE VACCINE: CPT | Mod: PBBFAC

## 2021-10-11 ENCOUNTER — LAB VISIT (OUTPATIENT)
Dept: LAB | Facility: HOSPITAL | Age: 66
End: 2021-10-11
Payer: MEDICARE

## 2021-10-11 DIAGNOSIS — Z01.89 ROUTINE LAB DRAW: ICD-10-CM

## 2021-10-11 DIAGNOSIS — R07.1 CHEST PAIN ON BREATHING: ICD-10-CM

## 2021-10-11 DIAGNOSIS — E55.9 MILD VITAMIN D DEFICIENCY: ICD-10-CM

## 2021-10-11 DIAGNOSIS — R63.5 ABNORMAL WEIGHT GAIN: ICD-10-CM

## 2021-10-11 DIAGNOSIS — E66.9 OBESITY (BMI 30-39.9): ICD-10-CM

## 2021-10-11 DIAGNOSIS — Z13.220 SCREENING CHOLESTEROL LEVEL: ICD-10-CM

## 2021-10-11 LAB
25(OH)D3+25(OH)D2 SERPL-MCNC: 25 NG/ML (ref 30–96)
ALBUMIN SERPL BCP-MCNC: 3.9 G/DL (ref 3.5–5.2)
ALP SERPL-CCNC: 61 U/L (ref 55–135)
ALT SERPL W/O P-5'-P-CCNC: 17 U/L (ref 10–44)
ANION GAP SERPL CALC-SCNC: 12 MMOL/L (ref 8–16)
AST SERPL-CCNC: 16 U/L (ref 10–40)
BASOPHILS # BLD AUTO: 0.04 K/UL (ref 0–0.2)
BASOPHILS NFR BLD: 0.7 % (ref 0–1.9)
BILIRUB SERPL-MCNC: 0.9 MG/DL (ref 0.1–1)
BUN SERPL-MCNC: 16 MG/DL (ref 8–23)
CALCIUM SERPL-MCNC: 9.6 MG/DL (ref 8.7–10.5)
CHLORIDE SERPL-SCNC: 106 MMOL/L (ref 95–110)
CHOLEST SERPL-MCNC: 200 MG/DL (ref 120–199)
CHOLEST/HDLC SERPL: 2.9 {RATIO} (ref 2–5)
CO2 SERPL-SCNC: 23 MMOL/L (ref 23–29)
CREAT SERPL-MCNC: 0.7 MG/DL (ref 0.5–1.4)
DIFFERENTIAL METHOD: ABNORMAL
EOSINOPHIL # BLD AUTO: 0.1 K/UL (ref 0–0.5)
EOSINOPHIL NFR BLD: 1.8 % (ref 0–8)
ERYTHROCYTE [DISTWIDTH] IN BLOOD BY AUTOMATED COUNT: 13 % (ref 11.5–14.5)
EST. GFR  (AFRICAN AMERICAN): >60 ML/MIN/1.73 M^2
EST. GFR  (NON AFRICAN AMERICAN): >60 ML/MIN/1.73 M^2
GLUCOSE SERPL-MCNC: 93 MG/DL (ref 70–110)
HCT VFR BLD AUTO: 40.2 % (ref 37–48.5)
HDLC SERPL-MCNC: 69 MG/DL (ref 40–75)
HDLC SERPL: 34.5 % (ref 20–50)
HGB BLD-MCNC: 12.6 G/DL (ref 12–16)
IMM GRANULOCYTES # BLD AUTO: 0.01 K/UL (ref 0–0.04)
IMM GRANULOCYTES NFR BLD AUTO: 0.2 % (ref 0–0.5)
LDLC SERPL CALC-MCNC: 107.4 MG/DL (ref 63–159)
LYMPHOCYTES # BLD AUTO: 1.7 K/UL (ref 1–4.8)
LYMPHOCYTES NFR BLD: 28.6 % (ref 18–48)
MCH RBC QN AUTO: 30.2 PG (ref 27–31)
MCHC RBC AUTO-ENTMCNC: 31.3 G/DL (ref 32–36)
MCV RBC AUTO: 96 FL (ref 82–98)
MONOCYTES # BLD AUTO: 0.5 K/UL (ref 0.3–1)
MONOCYTES NFR BLD: 7.7 % (ref 4–15)
NEUTROPHILS # BLD AUTO: 3.6 K/UL (ref 1.8–7.7)
NEUTROPHILS NFR BLD: 61 % (ref 38–73)
NONHDLC SERPL-MCNC: 131 MG/DL
NRBC BLD-RTO: 0 /100 WBC
PLATELET # BLD AUTO: 221 K/UL (ref 150–450)
PMV BLD AUTO: 10.8 FL (ref 9.2–12.9)
POTASSIUM SERPL-SCNC: 4 MMOL/L (ref 3.5–5.1)
PROT SERPL-MCNC: 7.3 G/DL (ref 6–8.4)
RBC # BLD AUTO: 4.17 M/UL (ref 4–5.4)
SODIUM SERPL-SCNC: 141 MMOL/L (ref 136–145)
TRIGL SERPL-MCNC: 118 MG/DL (ref 30–150)
TSH SERPL DL<=0.005 MIU/L-ACNC: 2.43 UIU/ML (ref 0.4–4)
WBC # BLD AUTO: 5.95 K/UL (ref 3.9–12.7)

## 2021-10-11 PROCEDURE — 36415 COLL VENOUS BLD VENIPUNCTURE: CPT | Performed by: NURSE PRACTITIONER

## 2021-10-11 PROCEDURE — 80053 COMPREHEN METABOLIC PANEL: CPT | Performed by: NURSE PRACTITIONER

## 2021-10-11 PROCEDURE — 82306 VITAMIN D 25 HYDROXY: CPT | Performed by: NURSE PRACTITIONER

## 2021-10-11 PROCEDURE — 84443 ASSAY THYROID STIM HORMONE: CPT | Performed by: NURSE PRACTITIONER

## 2021-10-11 PROCEDURE — 80061 LIPID PANEL: CPT | Performed by: NURSE PRACTITIONER

## 2021-10-11 PROCEDURE — 85025 COMPLETE CBC W/AUTO DIFF WBC: CPT | Performed by: NURSE PRACTITIONER

## 2021-10-18 ENCOUNTER — PATIENT MESSAGE (OUTPATIENT)
Dept: INTERNAL MEDICINE | Facility: CLINIC | Age: 66
End: 2021-10-18

## 2021-10-20 ENCOUNTER — HOSPITAL ENCOUNTER (OUTPATIENT)
Dept: RADIOLOGY | Facility: HOSPITAL | Age: 66
Discharge: HOME | End: 2021-10-20
Attending: NURSE PRACTITIONER
Payer: MEDICARE

## 2021-10-20 VITALS — WEIGHT: 200 LBS | BODY MASS INDEX: 31.32 KG/M2

## 2021-10-20 DIAGNOSIS — Z12.31 ENCOUNTER FOR SCREENING MAMMOGRAM FOR MALIGNANT NEOPLASM OF BREAST: ICD-10-CM

## 2021-10-20 PROCEDURE — 77063 BREAST TOMOSYNTHESIS BI: CPT | Mod: 26,,, | Performed by: RADIOLOGY

## 2021-10-20 PROCEDURE — 77067 SCR MAMMO BI INCL CAD: CPT | Mod: 26,,, | Performed by: RADIOLOGY

## 2021-10-20 PROCEDURE — 77067 MAMMO DIGITAL SCREENING BILAT WITH TOMO: ICD-10-PCS | Mod: 26,,, | Performed by: RADIOLOGY

## 2021-10-20 PROCEDURE — 77067 SCR MAMMO BI INCL CAD: CPT | Mod: TC

## 2021-10-20 PROCEDURE — 77063 MAMMO DIGITAL SCREENING BILAT WITH TOMO: ICD-10-PCS | Mod: 26,,, | Performed by: RADIOLOGY

## 2021-10-27 ENCOUNTER — HOSPITAL ENCOUNTER (OUTPATIENT)
Dept: RADIOLOGY | Facility: CLINIC | Age: 66
Discharge: HOME | End: 2021-10-27
Attending: NURSE PRACTITIONER
Payer: MEDICARE

## 2021-10-27 DIAGNOSIS — Z78.0 POSTMENOPAUSAL ESTROGEN DEFICIENCY: ICD-10-CM

## 2021-10-27 PROCEDURE — 77080 DXA BONE DENSITY AXIAL: CPT | Mod: 26,,, | Performed by: INTERNAL MEDICINE

## 2021-10-27 PROCEDURE — 77080 DEXA BONE DENSITY SPINE HIP: ICD-10-PCS | Mod: 26,,, | Performed by: INTERNAL MEDICINE

## 2021-10-27 PROCEDURE — 77080 DXA BONE DENSITY AXIAL: CPT | Mod: TC

## 2021-11-04 DIAGNOSIS — M81.0 AGE-RELATED OSTEOPOROSIS WITHOUT CURRENT PATHOLOGICAL FRACTURE: Primary | ICD-10-CM

## 2021-11-04 RX ORDER — ALENDRONATE SODIUM 70 MG/1
70 TABLET ORAL
Qty: 4 TABLET | Refills: 11 | Status: SHIPPED | OUTPATIENT
Start: 2021-11-04 | End: 2022-05-18

## 2021-12-22 ENCOUNTER — TELEPHONE (OUTPATIENT)
Dept: INTERNAL MEDICINE | Facility: CLINIC | Age: 66
End: 2021-12-22
Payer: MEDICARE

## 2022-01-19 ENCOUNTER — OFFICE VISIT (OUTPATIENT)
Dept: OPTOMETRY | Facility: CLINIC | Age: 67
End: 2022-01-19
Payer: MEDICARE

## 2022-01-19 DIAGNOSIS — H52.4 PRESBYOPIA: ICD-10-CM

## 2022-01-19 DIAGNOSIS — H25.13 NUCLEAR SCLEROSIS, BILATERAL: Primary | ICD-10-CM

## 2022-01-19 DIAGNOSIS — Z13.5 GLAUCOMA SCREENING: ICD-10-CM

## 2022-01-19 PROCEDURE — 99999 PR PBB SHADOW E&M-EST. PATIENT-LVL II: ICD-10-PCS | Mod: PBBFAC,,, | Performed by: OPTOMETRIST

## 2022-01-19 PROCEDURE — 99999 PR PBB SHADOW E&M-EST. PATIENT-LVL II: CPT | Mod: PBBFAC,,, | Performed by: OPTOMETRIST

## 2022-01-19 PROCEDURE — 92015 PR REFRACTION: ICD-10-PCS | Mod: S$GLB,,, | Performed by: OPTOMETRIST

## 2022-01-19 PROCEDURE — 99212 OFFICE O/P EST SF 10 MIN: CPT | Mod: PBBFAC | Performed by: OPTOMETRIST

## 2022-01-19 PROCEDURE — 92015 DETERMINE REFRACTIVE STATE: CPT | Mod: S$GLB,,, | Performed by: OPTOMETRIST

## 2022-01-19 PROCEDURE — 92004 PR EYE EXAM, NEW PATIENT,COMPREHESV: ICD-10-PCS | Mod: S$GLB,,, | Performed by: OPTOMETRIST

## 2022-01-19 PROCEDURE — 92004 COMPRE OPH EXAM NEW PT 1/>: CPT | Mod: S$GLB,,, | Performed by: OPTOMETRIST

## 2022-01-19 NOTE — PROGRESS NOTES
HPI     Presents today for establish ocular health care.  No vision complaints--wear OTC readers +1.50.  Pt has dist spex for driving at night only--4 years old, did not bring   today.  Pt states about a week ago she experience a flash--no floater and shortly   after a headache came.  No gtts  No eye sx    Last edited by Monica Mendes MA on 1/19/2022  9:33 AM. (History)        ROS     Negative for: Constitutional, Gastrointestinal, Neurological, Skin,   Genitourinary, Musculoskeletal, HENT, Endocrine, Cardiovascular, Eyes,   Respiratory, Psychiatric, Allergic/Imm, Heme/Lymph    Last edited by Nando Bucio, OD on 1/19/2022  9:38 AM. (History)        Assessment /Plan     For exam results, see Encounter Report.    Nuclear sclerosis, bilateral    Glaucoma screening    Presbyopia      1. Small cats OU--pt wishes new Rx (wears dist only for night driving, and otc readers)  2. Cluster Migraine hx    PLAN:    rtc 1 yr

## 2022-05-10 ENCOUNTER — PES CALL (OUTPATIENT)
Dept: ADMINISTRATIVE | Facility: CLINIC | Age: 67
End: 2022-05-10
Payer: MEDICARE

## 2022-05-17 ENCOUNTER — TELEPHONE (OUTPATIENT)
Dept: ADMINISTRATIVE | Facility: CLINIC | Age: 67
End: 2022-05-17
Payer: MEDICARE

## 2022-05-18 ENCOUNTER — LAB VISIT (OUTPATIENT)
Dept: LAB | Facility: HOSPITAL | Age: 67
End: 2022-05-18
Payer: MEDICARE

## 2022-05-18 ENCOUNTER — OFFICE VISIT (OUTPATIENT)
Dept: INTERNAL MEDICINE | Facility: CLINIC | Age: 67
End: 2022-05-18
Payer: MEDICARE

## 2022-05-18 VITALS
DIASTOLIC BLOOD PRESSURE: 82 MMHG | HEART RATE: 57 BPM | WEIGHT: 220.44 LBS | BODY MASS INDEX: 34.53 KG/M2 | OXYGEN SATURATION: 97 % | SYSTOLIC BLOOD PRESSURE: 128 MMHG

## 2022-05-18 DIAGNOSIS — Z86.39 HISTORY OF HASHIMOTO THYROIDITIS: ICD-10-CM

## 2022-05-18 DIAGNOSIS — K62.89 RECTAL MASS: ICD-10-CM

## 2022-05-18 DIAGNOSIS — Z86.010 HISTORY OF COLON POLYPS: ICD-10-CM

## 2022-05-18 DIAGNOSIS — R79.89 LOW VITAMIN D LEVEL: ICD-10-CM

## 2022-05-18 DIAGNOSIS — M85.80 OSTEOPENIA, UNSPECIFIED LOCATION: ICD-10-CM

## 2022-05-18 DIAGNOSIS — R23.4 SKIN TEXTURE CHANGES: ICD-10-CM

## 2022-05-18 DIAGNOSIS — Z00.00 ENCOUNTER FOR PREVENTIVE HEALTH EXAMINATION: Primary | ICD-10-CM

## 2022-05-18 LAB
25(OH)D3+25(OH)D2 SERPL-MCNC: 25 NG/ML (ref 30–96)
ALBUMIN SERPL BCP-MCNC: 4 G/DL (ref 3.5–5.2)
ALP SERPL-CCNC: 73 U/L (ref 55–135)
ALT SERPL W/O P-5'-P-CCNC: 18 U/L (ref 10–44)
ANION GAP SERPL CALC-SCNC: 9 MMOL/L (ref 8–16)
AST SERPL-CCNC: 19 U/L (ref 10–40)
BASOPHILS # BLD AUTO: 0.03 K/UL (ref 0–0.2)
BASOPHILS NFR BLD: 0.4 % (ref 0–1.9)
BILIRUB SERPL-MCNC: 0.9 MG/DL (ref 0.1–1)
BUN SERPL-MCNC: 17 MG/DL (ref 8–23)
CALCIUM SERPL-MCNC: 9.3 MG/DL (ref 8.7–10.5)
CHLORIDE SERPL-SCNC: 108 MMOL/L (ref 95–110)
CO2 SERPL-SCNC: 26 MMOL/L (ref 23–29)
CREAT SERPL-MCNC: 0.7 MG/DL (ref 0.5–1.4)
DIFFERENTIAL METHOD: ABNORMAL
EOSINOPHIL # BLD AUTO: 0.1 K/UL (ref 0–0.5)
EOSINOPHIL NFR BLD: 1.7 % (ref 0–8)
ERYTHROCYTE [DISTWIDTH] IN BLOOD BY AUTOMATED COUNT: 12.9 % (ref 11.5–14.5)
EST. GFR  (AFRICAN AMERICAN): >60 ML/MIN/1.73 M^2
EST. GFR  (NON AFRICAN AMERICAN): >60 ML/MIN/1.73 M^2
GLUCOSE SERPL-MCNC: 93 MG/DL (ref 70–110)
HCT VFR BLD AUTO: 38.8 % (ref 37–48.5)
HGB BLD-MCNC: 12.1 G/DL (ref 12–16)
IMM GRANULOCYTES # BLD AUTO: 0.01 K/UL (ref 0–0.04)
IMM GRANULOCYTES NFR BLD AUTO: 0.1 % (ref 0–0.5)
LYMPHOCYTES # BLD AUTO: 1.8 K/UL (ref 1–4.8)
LYMPHOCYTES NFR BLD: 26.6 % (ref 18–48)
MCH RBC QN AUTO: 29.8 PG (ref 27–31)
MCHC RBC AUTO-ENTMCNC: 31.2 G/DL (ref 32–36)
MCV RBC AUTO: 96 FL (ref 82–98)
MONOCYTES # BLD AUTO: 0.4 K/UL (ref 0.3–1)
MONOCYTES NFR BLD: 6.1 % (ref 4–15)
NEUTROPHILS # BLD AUTO: 4.5 K/UL (ref 1.8–7.7)
NEUTROPHILS NFR BLD: 65.1 % (ref 38–73)
NRBC BLD-RTO: 0 /100 WBC
PLATELET # BLD AUTO: 226 K/UL (ref 150–450)
PMV BLD AUTO: 10.9 FL (ref 9.2–12.9)
POTASSIUM SERPL-SCNC: 4.1 MMOL/L (ref 3.5–5.1)
PROT SERPL-MCNC: 7.3 G/DL (ref 6–8.4)
RBC # BLD AUTO: 4.06 M/UL (ref 4–5.4)
SODIUM SERPL-SCNC: 143 MMOL/L (ref 136–145)
TSH SERPL DL<=0.005 MIU/L-ACNC: 2.75 UIU/ML (ref 0.4–4)
WBC # BLD AUTO: 6.88 K/UL (ref 3.9–12.7)

## 2022-05-18 PROCEDURE — 80053 COMPREHEN METABOLIC PANEL: CPT | Performed by: NURSE PRACTITIONER

## 2022-05-18 PROCEDURE — 99999 PR PBB SHADOW E&M-EST. PATIENT-LVL III: CPT | Mod: PBBFAC,,, | Performed by: NURSE PRACTITIONER

## 2022-05-18 PROCEDURE — G0439 PPPS, SUBSEQ VISIT: HCPCS | Mod: S$GLB,,, | Performed by: NURSE PRACTITIONER

## 2022-05-18 PROCEDURE — G0439 PR MEDICARE ANNUAL WELLNESS SUBSEQUENT VISIT: ICD-10-PCS | Mod: S$GLB,,, | Performed by: NURSE PRACTITIONER

## 2022-05-18 PROCEDURE — 99999 PR PBB SHADOW E&M-EST. PATIENT-LVL III: ICD-10-PCS | Mod: PBBFAC,,, | Performed by: NURSE PRACTITIONER

## 2022-05-18 PROCEDURE — 82306 VITAMIN D 25 HYDROXY: CPT | Performed by: NURSE PRACTITIONER

## 2022-05-18 PROCEDURE — 84443 ASSAY THYROID STIM HORMONE: CPT | Performed by: NURSE PRACTITIONER

## 2022-05-18 PROCEDURE — 85025 COMPLETE CBC W/AUTO DIFF WBC: CPT | Performed by: NURSE PRACTITIONER

## 2022-05-18 PROCEDURE — 36415 COLL VENOUS BLD VENIPUNCTURE: CPT | Performed by: NURSE PRACTITIONER

## 2022-05-18 NOTE — PROGRESS NOTES
Nano Sosa presented for a  Medicare AWV and comprehensive Health Risk Assessment today. The following components were reviewed and updated:    · Medical history  · Family History  · Social history  · Allergies and Current Medications  · Health Risk Assessment  · Health Maintenance  · Care Team         ** See Completed Assessments for Annual Wellness Visit within the encounter summary.**         The following assessments were completed:  · Living Situation  · CAGE  · Depression Screening  · Timed Get Up and Go  · Whisper Test  · Cognitive Function Screening  · Nutrition Screening  · ADL Screening  · PAQ Screening            Vitals:    05/18/22 0803   BP: 128/82   Pulse: (!) 57   SpO2: 97%   Weight: 100 kg (220 lb 7.4 oz)     Body mass index is 34.53 kg/m².  Physical Exam  Vitals and nursing note reviewed.   Constitutional:       Appearance: She is well-developed.   HENT:      Head: Normocephalic and atraumatic.      Right Ear: External ear normal.      Left Ear: External ear normal.      Nose: Nose normal.   Eyes:      Pupils: Pupils are equal, round, and reactive to light.   Cardiovascular:      Rate and Rhythm: Normal rate and regular rhythm.      Heart sounds: Normal heart sounds.   Pulmonary:      Effort: Pulmonary effort is normal.      Breath sounds: Normal breath sounds.   Abdominal:      General: Bowel sounds are normal.      Palpations: Abdomen is soft.   Musculoskeletal:         General: Normal range of motion.      Cervical back: Normal range of motion.   Skin:     General: Skin is warm and dry.   Neurological:      Mental Status: She is alert and oriented to person, place, and time.               Diagnoses and health risks identified today and associated recommendations/orders:    1. Encounter for preventive health examination  Health Maintenance updated   Records reviewed   Exam done     2. Skin texture changes  - Ambulatory referral/consult to Dermatology; Future  - Comprehensive Metabolic  Panel; Future  - CBC Auto Differential; Future  - TSH; Future    3. Low vitamin D level  - Vitamin D; Future    4. History of colon polyps  - Case Request Endoscopy: COLONOSCOPY    5. History of Hashimoto thyroiditis  - TSH; Future    6. Rectal mass  - Case Request Endoscopy: COLONOSCOPY    7. Osteopenia, unspecified location  Stable and chronic. Followed by PCP.     Counseling and Referral of Other Preventative  (Italic type indicates deductible and co-insurance are waived)    Patient Name: Nano Sosa  Today's Date: 5/18/2022    Health Maintenance       Date Due Completion Date    Shingles Vaccine (2 of 3) 02/23/2016 12/29/2015    Pneumococcal Vaccines (Age 65+) (2 - PPSV23 or PCV20) 10/12/2021 10/12/2020    COVID-19 Vaccine (4 - Booster for Pfizer series) 02/07/2022 10/7/2021    Mammogram 10/20/2022 10/20/2021    DEXA Scan 10/27/2023 10/27/2021    Colorectal Cancer Screening 01/07/2024 1/7/2019    Lipid Panel 10/11/2026 10/11/2021        Orders Placed This Encounter   Procedures    Vitamin D    Comprehensive Metabolic Panel    CBC Auto Differential    TSH    Ambulatory referral/consult to Dermatology       Provided Nano with a 5-10 year written screening schedule and personal prevention plan. Recommendations were developed using the USPSTF age appropriate recommendations. Education, counseling, and referrals were provided as needed. After Visit Summary printed and given to patient which includes a list of additional screenings\tests needed.    Follow up in about 5 months (around 10/31/2022) for follow up with Dr Washington.    Madiha Garcia, NP    I offered to discuss advanced care planning, including how to pick a person who would make decisions for you if you were unable to make them for yourself, called a health care power of , and what kind of decisions you might make such as use of life sustaining treatments such as ventilators and tube feeding when faced with a life limiting illness recorded  on a living will that they will need to know. (How you want to be cared for as you near the end of your natural life)     X  Patient has advanced directives written and agrees to provide copies to the institution.

## 2022-05-18 NOTE — PATIENT INSTRUCTIONS
Counseling and Referral of Other Preventative  (Italic type indicates deductible and co-insurance are waived)    Patient Name: Nano Sosa  Today's Date: 5/18/2022    Health Maintenance       Date Due Completion Date    Shingles Vaccine (2 of 3) 02/23/2016 12/29/2015    Pneumococcal Vaccines (Age 65+) (2 - PPSV23 or PCV20) 10/12/2021 10/12/2020    COVID-19 Vaccine (4 - Booster for Pfizer series) 02/07/2022 10/7/2021    Mammogram 10/20/2022 10/20/2021    DEXA Scan 10/27/2023 10/27/2021    Colorectal Cancer Screening 01/07/2024 1/7/2019    Lipid Panel 10/11/2026 10/11/2021        Orders Placed This Encounter   Procedures    Vitamin D    Comprehensive Metabolic Panel    CBC Auto Differential    TSH    Ambulatory referral/consult to Dermatology     The following information is provided to all patients.  This information is to help you find resources for any of the problems found today that may be affecting your health:                Living healthy guide: www.[facility].[place].gov      Understanding Diabetes: www.diabetes.org      Eating healthy: www.cdc.gov/healthyweight      CDC home safety checklist: www.cdc.gov/steadi/patient.html      Agency on Aging: www.goea.[place].gov      Alcoholics anonymous (AA): www.aa.org      Physical Activity: www.yamilka.nih.gov/uo5nuej      Tobacco use: www.quitwithusla.org

## 2022-05-25 ENCOUNTER — IMMUNIZATION (OUTPATIENT)
Dept: INTERNAL MEDICINE | Facility: CLINIC | Age: 67
End: 2022-05-25
Payer: MEDICARE

## 2022-05-25 DIAGNOSIS — Z23 NEED FOR VACCINATION: Primary | ICD-10-CM

## 2022-05-25 PROCEDURE — 91305 COVID-19, MRNA, LNP-S, PF, 30 MCG/0.3 ML DOSE VACCINE (PFIZER): CPT | Mod: PBBFAC | Performed by: INTERNAL MEDICINE

## 2022-07-13 ENCOUNTER — OFFICE VISIT (OUTPATIENT)
Dept: DERMATOLOGY | Facility: CLINIC | Age: 67
End: 2022-07-13
Payer: MEDICARE

## 2022-07-13 DIAGNOSIS — L72.0 MILIA: Primary | ICD-10-CM

## 2022-07-13 DIAGNOSIS — D22.9 MULTIPLE BENIGN NEVI: ICD-10-CM

## 2022-07-13 DIAGNOSIS — L82.1 SK (SEBORRHEIC KERATOSIS): ICD-10-CM

## 2022-07-13 PROCEDURE — 99202 OFFICE O/P NEW SF 15 MIN: CPT | Mod: S$GLB,,, | Performed by: DERMATOLOGY

## 2022-07-13 PROCEDURE — 99999 PR PBB SHADOW E&M-EST. PATIENT-LVL III: ICD-10-PCS | Mod: PBBFAC,,, | Performed by: DERMATOLOGY

## 2022-07-13 PROCEDURE — 99999 PR PBB SHADOW E&M-EST. PATIENT-LVL III: CPT | Mod: PBBFAC,,, | Performed by: DERMATOLOGY

## 2022-07-13 PROCEDURE — 99202 PR OFFICE/OUTPT VISIT, NEW, LEVL II, 15-29 MIN: ICD-10-PCS | Mod: S$GLB,,, | Performed by: DERMATOLOGY

## 2022-07-13 NOTE — PATIENT INSTRUCTIONS
Sun Protection      The [facility] of Dermat[facility] would like to remind you of the importance of sun protection all year round and particularly during the summer when the suns rays are the strongest. It has been proven that both acute and chronic sun exposure damages our cells and leads to skin cancer. Beyond skin cancer, the sun causes 90% of the symptoms of premature skin aging, including wrinkles, lentigines (brown spots), and thin, easily bruised skin. Proper sun protection can help prevent these unwanted conditions.    Many patients report that they dont go in the sun. It has been shown that the average person receives 18 hours of incidental sun exposure per week during activities such as walking through parking lots, driving, or sitting next to windows. This accumulates to several bad sunburns per year!    In choosing sunscreen, you want one that protects against both UVA and UVB rays (broad spectrum). It is recommended that you use one of SPF 30 or higher. It is important to apply the sunscreen about 20 minutes prior to sun exposure. Most sunscreens are chemical sunscreens and a reaction must take place in the skin so that they are effective. If they are applied and then you are immediately exposed to the sun or start sweating, this reaction has not had time to take place and you are therefore unprotected. Sunscreen needs to be reapplied every 2 hours if you are participating in water sports or sweating. We recommend Elta MD or CeraVe sunscreens for daily use; however there are many options and it is most important for you to find one that you will use on a consistent basis.    If you have sensitive skin, you may do best with a sunscreen that contains only physical blockers in the active ingredient section. The only physical blockers available in the [place] currently are titanium dioxide or zinc oxide. These are typically thicker and harder to apply, however they afford very good protection.  Neutrogena Sensitive Skin, Blue Lizard Sensitive Skin (pink top) or Neutrogena Pure and Free are popular ones.     Aside from sunscreen, clothes with UV protection (UPF), wide brimmed hats, and sunglasses are other means of sun protection that we recommend.      Based on a recent study (6/2021) and out of an abundance of caution, we are recommending that you AVOID the following sunscreens as they may contain the carcinogen, benzene:    Spray and gel sunscreens  Any [employer] or [employer]reens brands as well as Max Block and TopCare brands   Neutrogena Ultra Sheer Dry-touch Water Resistant Sunscreen LOTION SPF 70   Neutrogena Sheer Zinc Dry-touch Face Sunscreen LOTION SPF 50   5.   Aveeno Baby Continuous Protection Sensitive Skin Sunscreen LOTION - Broad Spectrum SPF 50    Please note that Benzene is not an ingredient or the degradation product of any ingredient in any sunscreen. This study suggested that the findings are a result of contamination in the manufacturing process. At this point, we don't know how effectively Benzene gets through the skin, if it gets absorbed systemically, and what effects it may have.     We do know that ultraviolet radiation is a well-established carcinogen. Please use daily sun protection/avoidance and use of at least SPF 30, broad-spectrum sunscreen not listed above.                       [facility] - DERMATOLOGY [address]  [address]  [place] [address]  Dept: [phone]  Dept Fax: [phone]       CRYOSURGERY      Your doctor has used a method called cryosurgery to treat your skin condition. Cryosurgery refers to the use of very cold substances to treat a variety of skin conditions such as warts, pre-skin cancers, molluscum contagiosum, sun spots, and several benign growths. The substance we use in cryosurgery is liquid nitrogen and is so cold (-195 degrees Celsius) that is burns when administered.     Following treatment in the office, the skin may  immediately burn and become red. You may find the area around the lesion is affected as well. It is sometimes necessary to treat not only the lesion, but a small area of the surrounding normal skin to achieve a good response.     A blister, and even a blood filled blister, may form after treatment.   This is a normal response. If the blister is painful, it is acceptable to sterilize a needle and with rubbing alcohol and gently pop the blister. It is important that you gently wash the area with soap and warm water as the blister fluid may contain wart virus if a wart was treated. Do no remove the roof of the blister.     The area treated can take anywhere from 1-3 weeks to heal. Healing time depends on the kind skin lesion treated, the location, and how aggressively the lesion was treated. It is recommended that the areas treated are covered with Vaseline or bacitracin ointment and a band-aid. If a band-aid is not practical, just ointment applied several times per day will do. Keeping these areas moist will speed the healing time.    Treatment with liquid nitrogen can leave a scar. In dark skin, it may be a light or dark scar, in light skin it may be a white or pink scar. These will generally fade with time, but may never go away completely.     If you have any concerns after your treatment, please feel free to call the office.       [facility] [place] [address]/ [phone] FAX/ [email or web address]

## 2022-07-13 NOTE — PROGRESS NOTES
Subjective:       Patient ID:  Nano Sosa is a 67 y.o. female who presents for   Chief Complaint   Patient presents with    Mole    Spot     HPI  Pt presents today for a mole on the right chest that she has had for several years.   Pt states that the mole has changed in texture and is growing.   Denies any itching, burning, or bleeding to the mole.     Pt also has a spot on the nose that is rough. States she has not treated the spot on nose with anything.   Wants to note that she has continuous numbness and in fingers and toes since being diagnosed with covid last year.   Denies any other concerns on skin today.     Review of Systems   Constitutional: Positive for fatigue. Negative for fever, chills, weight loss, weight gain, night sweats and malaise.   Skin: Positive for activity-related sunscreen use. Negative for daily sunscreen use and recent sunburn.   Hematologic/Lymphatic: Does not bruise/bleed easily.        Objective:    Physical Exam   Constitutional: She appears well-developed and well-nourished. No distress.   Neurological: She is alert and oriented to person, place, and time. She is not disoriented.   Psychiatric: She has a normal mood and affect.   Skin:   Areas Examined (abnormalities noted in diagram):   Head / Face Inspection Performed  Chest / Axilla Inspection Performed                   Diagram Legend     Erythematous scaling macule/papule c/w actinic keratosis       Vascular papule c/w angioma      Pigmented verrucoid papule/plaque c/w seborrheic keratosis      Yellow umbilicated papule c/w sebaceous hyperplasia      Irregularly shaped tan macule c/w lentigo     1-2 mm smooth white papules consistent with Milia      Movable subcutaneous cyst with punctum c/w epidermal inclusion cyst      Subcutaneous movable cyst c/w pilar cyst      Firm pink to brown papule c/w dermatofibroma      Pedunculated fleshy papule(s) c/w skin tag(s)      Evenly pigmented macule c/w junctional nevus      Mildly variegated pigmented, slightly irregular-bordered macule c/w mildly atypical nevus      Flesh colored to evenly pigmented papule c/w intradermal nevus       Pink pearly papule/plaque c/w basal cell carcinoma      Erythematous hyperkeratotic cursted plaque c/w SCC      Surgical scar with no sign of skin cancer recurrence      Open and closed comedones      Inflammatory papules and pustules      Verrucoid papule consistent consistent with wart     Erythematous eczematous patches and plaques     Dystrophic onycholytic nail with subungual debris c/w onychomycosis     Umbilicated papule    Erythematous-base heme-crusted tan verrucoid plaque consistent with inflamed seborrheic keratosis     Erythematous Silvery Scaling Plaque c/w Psoriasis     See annotation      Assessment / Plan:        Milia  This is a benign cyst. Reassurance provided. No treatment is necessary unless it is symptomatic. These may resolve on their own.    SK (seborrheic keratosis)  These are benign inherited growths without a malignant potential. Reassurance given to patient. No treatment is necessary.   Treatment of benign, asymptomatic lesions may be considered cosmetic.  Warned about risk of hypo- or hyperpigmentation with treatment and risk of recurrence.    Multiple benign nevi  Benign-appearing nevi present on exam today. Reassurance provided. Periodically examine moles and return to clinic if any moles change or become symptomatic (bleeding, itching, pain, etc).      Follow up in about 1 year (around 7/13/2023) for skin check or sooner for any concerns.

## 2022-08-08 ENCOUNTER — PATIENT MESSAGE (OUTPATIENT)
Dept: INTERNAL MEDICINE | Facility: CLINIC | Age: 67
End: 2022-08-08
Payer: MEDICARE

## 2022-08-16 ENCOUNTER — OFFICE VISIT (OUTPATIENT)
Dept: INTERNAL MEDICINE | Facility: CLINIC | Age: 67
End: 2022-08-16
Payer: MEDICARE

## 2022-08-16 VITALS
WEIGHT: 216.69 LBS | HEART RATE: 64 BPM | HEIGHT: 67 IN | SYSTOLIC BLOOD PRESSURE: 124 MMHG | BODY MASS INDEX: 34.01 KG/M2 | RESPIRATION RATE: 18 BRPM | TEMPERATURE: 98 F | DIASTOLIC BLOOD PRESSURE: 68 MMHG

## 2022-08-16 DIAGNOSIS — R39.15 URINARY URGENCY: ICD-10-CM

## 2022-08-16 DIAGNOSIS — R30.0 DYSURIA: Primary | ICD-10-CM

## 2022-08-16 DIAGNOSIS — R35.1 NOCTURIA: ICD-10-CM

## 2022-08-16 DIAGNOSIS — E66.9 OBESITY (BMI 30-39.9): ICD-10-CM

## 2022-08-16 DIAGNOSIS — N39.0 ACUTE LOWER UTI: ICD-10-CM

## 2022-08-16 DIAGNOSIS — Z23 NEED FOR SHINGLES VACCINE: ICD-10-CM

## 2022-08-16 LAB
BILIRUB SERPL-MCNC: ABNORMAL MG/DL
BLOOD URINE, POC: ABNORMAL
COLOR, POC UA: YELLOW
GLUCOSE UR QL STRIP: NORMAL
KETONES UR QL STRIP: ABNORMAL
LEUKOCYTE ESTERASE URINE, POC: ABNORMAL
NITRITE, POC UA: ABNORMAL
PH, POC UA: 5
PROTEIN, POC: ABNORMAL
SPECIFIC GRAVITY, POC UA: 1025
UROBILINOGEN, POC UA: NORMAL

## 2022-08-16 PROCEDURE — 99214 PR OFFICE/OUTPT VISIT, EST, LEVL IV, 30-39 MIN: ICD-10-PCS | Mod: S$GLB,,, | Performed by: NURSE PRACTITIONER

## 2022-08-16 PROCEDURE — 99214 OFFICE O/P EST MOD 30 MIN: CPT | Mod: S$GLB,,, | Performed by: NURSE PRACTITIONER

## 2022-08-16 PROCEDURE — 3008F BODY MASS INDEX DOCD: CPT | Mod: CPTII,S$GLB,, | Performed by: NURSE PRACTITIONER

## 2022-08-16 PROCEDURE — 1159F MED LIST DOCD IN RCRD: CPT | Mod: CPTII,S$GLB,, | Performed by: NURSE PRACTITIONER

## 2022-08-16 PROCEDURE — 3074F SYST BP LT 130 MM HG: CPT | Mod: CPTII,S$GLB,, | Performed by: NURSE PRACTITIONER

## 2022-08-16 PROCEDURE — 1160F RVW MEDS BY RX/DR IN RCRD: CPT | Mod: CPTII,S$GLB,, | Performed by: NURSE PRACTITIONER

## 2022-08-16 PROCEDURE — 1125F AMNT PAIN NOTED PAIN PRSNT: CPT | Mod: CPTII,S$GLB,, | Performed by: NURSE PRACTITIONER

## 2022-08-16 PROCEDURE — 3008F PR BODY MASS INDEX (BMI) DOCUMENTED: ICD-10-PCS | Mod: CPTII,S$GLB,, | Performed by: NURSE PRACTITIONER

## 2022-08-16 PROCEDURE — 87088 URINE BACTERIA CULTURE: CPT | Performed by: NURSE PRACTITIONER

## 2022-08-16 PROCEDURE — 81001 URINALYSIS AUTO W/SCOPE: CPT | Mod: S$GLB,,, | Performed by: NURSE PRACTITIONER

## 2022-08-16 PROCEDURE — 87077 CULTURE AEROBIC IDENTIFY: CPT | Performed by: NURSE PRACTITIONER

## 2022-08-16 PROCEDURE — 3078F PR MOST RECENT DIASTOLIC BLOOD PRESSURE < 80 MM HG: ICD-10-PCS | Mod: CPTII,S$GLB,, | Performed by: NURSE PRACTITIONER

## 2022-08-16 PROCEDURE — 87086 URINE CULTURE/COLONY COUNT: CPT | Performed by: NURSE PRACTITIONER

## 2022-08-16 PROCEDURE — 1125F PR PAIN SEVERITY QUANTIFIED, PAIN PRESENT: ICD-10-PCS | Mod: CPTII,S$GLB,, | Performed by: NURSE PRACTITIONER

## 2022-08-16 PROCEDURE — 81001 POCT URINALYSIS, DIPSTICK OR TABLET REAGENT, AUTOMATED, WITH MICROSCOP: ICD-10-PCS | Mod: S$GLB,,, | Performed by: NURSE PRACTITIONER

## 2022-08-16 PROCEDURE — 99999 PR PBB SHADOW E&M-EST. PATIENT-LVL III: CPT | Mod: PBBFAC,,, | Performed by: NURSE PRACTITIONER

## 2022-08-16 PROCEDURE — 1160F PR REVIEW ALL MEDS BY PRESCRIBER/CLIN PHARMACIST DOCUMENTED: ICD-10-PCS | Mod: CPTII,S$GLB,, | Performed by: NURSE PRACTITIONER

## 2022-08-16 PROCEDURE — 87186 SC STD MICRODIL/AGAR DIL: CPT | Performed by: NURSE PRACTITIONER

## 2022-08-16 PROCEDURE — 3074F PR MOST RECENT SYSTOLIC BLOOD PRESSURE < 130 MM HG: ICD-10-PCS | Mod: CPTII,S$GLB,, | Performed by: NURSE PRACTITIONER

## 2022-08-16 PROCEDURE — 1159F PR MEDICATION LIST DOCUMENTED IN MEDICAL RECORD: ICD-10-PCS | Mod: CPTII,S$GLB,, | Performed by: NURSE PRACTITIONER

## 2022-08-16 PROCEDURE — 3078F DIAST BP <80 MM HG: CPT | Mod: CPTII,S$GLB,, | Performed by: NURSE PRACTITIONER

## 2022-08-16 PROCEDURE — 99999 PR PBB SHADOW E&M-EST. PATIENT-LVL III: ICD-10-PCS | Mod: PBBFAC,,, | Performed by: NURSE PRACTITIONER

## 2022-08-16 RX ORDER — NITROFURANTOIN (MACROCRYSTALS) 100 MG/1
100 CAPSULE ORAL EVERY 12 HOURS
Qty: 14 CAPSULE | Refills: 0 | Status: SHIPPED | OUTPATIENT
Start: 2022-08-16 | End: 2022-08-23

## 2022-08-16 NOTE — PATIENT INSTRUCTIONS
Meds as prescribed.    Drink plenty of water to flush bladder.    Avoid caffeine, sugary drinks, and ETOH.    Wipe from front to back.    Do not hold bladder when needing to void for long periods.    Will call with culture results when received and if therapy needs to change

## 2022-08-18 LAB — BACTERIA UR CULT: ABNORMAL

## 2022-10-03 ENCOUNTER — PATIENT MESSAGE (OUTPATIENT)
Dept: DERMATOLOGY | Facility: CLINIC | Age: 67
End: 2022-10-03
Payer: MEDICARE

## 2022-10-04 DIAGNOSIS — Z12.31 ENCOUNTER FOR SCREENING MAMMOGRAM FOR MALIGNANT NEOPLASM OF BREAST: Primary | ICD-10-CM

## 2022-10-06 ENCOUNTER — IMMUNIZATION (OUTPATIENT)
Dept: INTERNAL MEDICINE | Facility: CLINIC | Age: 67
End: 2022-10-06
Payer: MEDICARE

## 2022-10-06 DIAGNOSIS — Z23 NEED FOR VACCINATION: Primary | ICD-10-CM

## 2022-10-06 PROCEDURE — G0008 ADMIN INFLUENZA VIRUS VAC: HCPCS | Mod: S$GLB,,, | Performed by: INTERNAL MEDICINE

## 2022-10-06 PROCEDURE — 0124A PR IMMUNIZ ADMIN, SARS-COV- 2 COVID-19 VACCINE, 30MCG/0.3ML, BIVALENT, BOOSTER DOSE: CPT | Mod: S$GLB,,, | Performed by: INTERNAL MEDICINE

## 2022-10-06 PROCEDURE — 91312 COVID-19, MRNA, LNP-S, BIVALENT BOOSTER, PF, 30 MCG/0.3 ML DOSE: ICD-10-PCS | Mod: S$GLB,,, | Performed by: INTERNAL MEDICINE

## 2022-10-06 PROCEDURE — 90694 FLU VACCINE - QUADRIVALENT - ADJUVANTED: ICD-10-PCS | Mod: S$GLB,,, | Performed by: INTERNAL MEDICINE

## 2022-10-06 PROCEDURE — 0124A PR IMMUNIZ ADMIN, SARS-COV- 2 COVID-19 VACCINE, 30MCG/0.3ML, BIVALENT, BOOSTER DOSE: ICD-10-PCS | Mod: S$GLB,,, | Performed by: INTERNAL MEDICINE

## 2022-10-06 PROCEDURE — 91312 COVID-19, MRNA, LNP-S, BIVALENT BOOSTER, PF, 30 MCG/0.3 ML DOSE: CPT | Mod: S$GLB,,, | Performed by: INTERNAL MEDICINE

## 2022-10-06 PROCEDURE — G0008 FLU VACCINE - QUADRIVALENT - ADJUVANTED: ICD-10-PCS | Mod: S$GLB,,, | Performed by: INTERNAL MEDICINE

## 2022-10-06 PROCEDURE — 90694 VACC AIIV4 NO PRSRV 0.5ML IM: CPT | Mod: S$GLB,,, | Performed by: INTERNAL MEDICINE

## 2022-10-06 PROCEDURE — 0124A COVID-19, MRNA, LNP-S, BIVALENT BOOSTER, PF, 30 MCG/0.3 ML DOSE: CPT | Mod: CV19,PBBFAC | Performed by: INTERNAL MEDICINE

## 2022-10-11 NOTE — PROGRESS NOTES
Subjective:       Patient ID:  Nano Sosa is a 67 y.o. female who presents for   Chief Complaint   Patient presents with    Spot     66 yo F presents to acute dermatology clinic. Last seen 07/2022 by Dr. Wesley at which time skin exam was benign. Today, she presents for eval of an itchy spot on her right forearm x 2 weeks. She noticed it initially while traveling o visit family in [place]. Did not have any known tick or arthropod bites. Spent time outside walking her dog in a neighborhood but did not go in the woods or hiking. No systemic symptoms. Continues to be itchy and is not going away. Has tried benadryl spray on the area but otherwise has not tried anything.       Review of Systems   Constitutional:  Negative for fever and chills.   HENT:  Negative for headaches.    Gastrointestinal:  Negative for nausea, vomiting and diarrhea.   Skin:  Positive for itching and rash.   Neurological:  Negative for headaches.      Objective:    Physical Exam   Constitutional: She appears well-developed and well-nourished.   Neurological: She is alert and oriented to person, place, and time.   Psychiatric: She has a normal mood and affect.   Skin:   Areas Examined (abnormalities noted in diagram):   RUE Inspected  LUE Inspection Performed           Diagram Legend     Erythematous scaling macule/papule c/w actinic keratosis       Vascular papule c/w angioma      Pigmented verrucoid papule/plaque c/w seborrheic keratosis      Yellow umbilicated papule c/w sebaceous hyperplasia      Irregularly shaped tan macule c/w lentigo     1-2 mm smooth white papules consistent with Milia      Movable subcutaneous cyst with punctum c/w epidermal inclusion cyst      Subcutaneous movable cyst c/w pilar cyst      Firm pink to brown papule c/w dermatofibroma      Pedunculated fleshy papule(s) c/w skin tag(s)      Evenly pigmented macule c/w junctional nevus     Mildly variegated pigmented, slightly irregular-bordered macule c/w mildly  atypical nevus      Flesh colored to evenly pigmented papule c/w intradermal nevus       Pink pearly papule/plaque c/w basal cell carcinoma      Erythematous hyperkeratotic cursted plaque c/w SCC      Surgical scar with no sign of skin cancer recurrence      Open and closed comedones      Inflammatory papules and pustules      Verrucoid papule consistent consistent with wart     Erythematous eczematous patches and plaques     Dystrophic onycholytic nail with subungual debris c/w onychomycosis     Umbilicated papule    Erythematous-base heme-crusted tan verrucoid plaque consistent with inflamed seborrheic keratosis     Erythematous Silvery Scaling Plaque c/w Psoriasis     See annotation        Assessment / Plan:        Tinea corporis: Right forearm x 2 weeks. BHAVYA (+). Likely from pets.   Discussed etiology, preventative measures, and measures to prevent spread of infection.   Start ketoconazole 2% cream BID x 4 weeks. Apply to any other areas that may arise.     -     ketoconazole (NIZORAL) 2 % cream; Apply topically 2 (two) times daily.  Dispense: 60 g; Refill: 1    Milia  Reassurance provided regarding benign lesion. No treatment is necessary.     Follow up for regular skin examinations.    Patti Bennett MD  [facility] Dermatology, PGY-3

## 2022-10-12 ENCOUNTER — OFFICE VISIT (OUTPATIENT)
Dept: DERMATOLOGY | Facility: CLINIC | Age: 67
End: 2022-10-12
Payer: MEDICARE

## 2022-10-12 DIAGNOSIS — B35.4 TINEA CORPORIS: Primary | ICD-10-CM

## 2022-10-12 DIAGNOSIS — L72.0 MILIA: ICD-10-CM

## 2022-10-12 PROCEDURE — 99214 OFFICE O/P EST MOD 30 MIN: CPT | Mod: GC,S$GLB,, | Performed by: DERMATOLOGY

## 2022-10-12 PROCEDURE — 87220 PR  TISSUE EXAM BY KOH: ICD-10-PCS | Mod: GC,S$GLB,, | Performed by: DERMATOLOGY

## 2022-10-12 PROCEDURE — 99214 PR OFFICE/OUTPT VISIT, EST, LEVL IV, 30-39 MIN: ICD-10-PCS | Mod: GC,S$GLB,, | Performed by: DERMATOLOGY

## 2022-10-12 PROCEDURE — 87220 TISSUE EXAM FOR FUNGI: CPT | Mod: GC,S$GLB,, | Performed by: DERMATOLOGY

## 2022-10-12 PROCEDURE — 99999 PR PBB SHADOW E&M-EST. PATIENT-LVL I: CPT | Mod: PBBFAC,,,

## 2022-10-12 PROCEDURE — 99999 PR PBB SHADOW E&M-EST. PATIENT-LVL I: ICD-10-PCS | Mod: PBBFAC,,,

## 2022-10-12 RX ORDER — KETOCONAZOLE 20 MG/G
CREAM TOPICAL 2 TIMES DAILY
Qty: 60 G | Refills: 1 | Status: SHIPPED | OUTPATIENT
Start: 2022-10-12 | End: 2023-03-02

## 2022-10-31 ENCOUNTER — OFFICE VISIT (OUTPATIENT)
Dept: INTERNAL MEDICINE | Facility: CLINIC | Age: 67
End: 2022-10-31
Payer: MEDICARE

## 2022-10-31 ENCOUNTER — LAB VISIT (OUTPATIENT)
Dept: LAB | Facility: HOSPITAL | Age: 67
End: 2022-10-31
Payer: MEDICARE

## 2022-10-31 VITALS
DIASTOLIC BLOOD PRESSURE: 78 MMHG | HEART RATE: 55 BPM | HEIGHT: 67 IN | WEIGHT: 195 LBS | SYSTOLIC BLOOD PRESSURE: 136 MMHG | OXYGEN SATURATION: 97 % | BODY MASS INDEX: 30.61 KG/M2

## 2022-10-31 DIAGNOSIS — Z00.00 VISIT FOR ANNUAL HEALTH EXAMINATION: Primary | ICD-10-CM

## 2022-10-31 DIAGNOSIS — R79.89 LOW VITAMIN D LEVEL: ICD-10-CM

## 2022-10-31 DIAGNOSIS — Z86.39 HISTORY OF HASHIMOTO THYROIDITIS: ICD-10-CM

## 2022-10-31 DIAGNOSIS — Z91.09 ENVIRONMENTAL ALLERGIES: ICD-10-CM

## 2022-10-31 DIAGNOSIS — E04.1 THYROID NODULE: ICD-10-CM

## 2022-10-31 DIAGNOSIS — M81.0 AGE-RELATED OSTEOPOROSIS WITHOUT CURRENT PATHOLOGICAL FRACTURE: ICD-10-CM

## 2022-10-31 DIAGNOSIS — R91.8 PULMONARY NODULES: ICD-10-CM

## 2022-10-31 DIAGNOSIS — Z87.19 HISTORY OF ULCERATIVE COLITIS: ICD-10-CM

## 2022-10-31 DIAGNOSIS — Z76.89 ENCOUNTER TO ESTABLISH CARE WITH NEW DOCTOR: ICD-10-CM

## 2022-10-31 DIAGNOSIS — Z87.891 PERSONAL HISTORY OF NICOTINE DEPENDENCE: ICD-10-CM

## 2022-10-31 DIAGNOSIS — K62.89 RECTAL MASS: ICD-10-CM

## 2022-10-31 DIAGNOSIS — Z86.010 HISTORY OF COLON POLYPS: ICD-10-CM

## 2022-10-31 LAB — 25(OH)D3+25(OH)D2 SERPL-MCNC: 46 NG/ML (ref 30–96)

## 2022-10-31 PROCEDURE — 1160F RVW MEDS BY RX/DR IN RCRD: CPT | Mod: CPTII,S$GLB,, | Performed by: INTERNAL MEDICINE

## 2022-10-31 PROCEDURE — 1126F AMNT PAIN NOTED NONE PRSNT: CPT | Mod: CPTII,S$GLB,, | Performed by: INTERNAL MEDICINE

## 2022-10-31 PROCEDURE — 1159F PR MEDICATION LIST DOCUMENTED IN MEDICAL RECORD: ICD-10-PCS | Mod: CPTII,S$GLB,, | Performed by: INTERNAL MEDICINE

## 2022-10-31 PROCEDURE — 1126F PR PAIN SEVERITY QUANTIFIED, NO PAIN PRESENT: ICD-10-PCS | Mod: CPTII,S$GLB,, | Performed by: INTERNAL MEDICINE

## 2022-10-31 PROCEDURE — 3078F DIAST BP <80 MM HG: CPT | Mod: CPTII,S$GLB,, | Performed by: INTERNAL MEDICINE

## 2022-10-31 PROCEDURE — 3075F PR MOST RECENT SYSTOLIC BLOOD PRESS GE 130-139MM HG: ICD-10-PCS | Mod: CPTII,S$GLB,, | Performed by: INTERNAL MEDICINE

## 2022-10-31 PROCEDURE — 1160F PR REVIEW ALL MEDS BY PRESCRIBER/CLIN PHARMACIST DOCUMENTED: ICD-10-PCS | Mod: CPTII,S$GLB,, | Performed by: INTERNAL MEDICINE

## 2022-10-31 PROCEDURE — 99397 PER PM REEVAL EST PAT 65+ YR: CPT | Mod: S$GLB,,, | Performed by: INTERNAL MEDICINE

## 2022-10-31 PROCEDURE — 1159F MED LIST DOCD IN RCRD: CPT | Mod: CPTII,S$GLB,, | Performed by: INTERNAL MEDICINE

## 2022-10-31 PROCEDURE — 99999 PR PBB SHADOW E&M-EST. PATIENT-LVL IV: CPT | Mod: PBBFAC,,, | Performed by: INTERNAL MEDICINE

## 2022-10-31 PROCEDURE — 1101F PR PT FALLS ASSESS DOC 0-1 FALLS W/OUT INJ PAST YR: ICD-10-PCS | Mod: CPTII,S$GLB,, | Performed by: INTERNAL MEDICINE

## 2022-10-31 PROCEDURE — 36415 COLL VENOUS BLD VENIPUNCTURE: CPT | Performed by: INTERNAL MEDICINE

## 2022-10-31 PROCEDURE — 3288F PR FALLS RISK ASSESSMENT DOCUMENTED: ICD-10-PCS | Mod: CPTII,S$GLB,, | Performed by: INTERNAL MEDICINE

## 2022-10-31 PROCEDURE — 3075F SYST BP GE 130 - 139MM HG: CPT | Mod: CPTII,S$GLB,, | Performed by: INTERNAL MEDICINE

## 2022-10-31 PROCEDURE — 99397 PR PREVENTIVE VISIT,EST,65 & OVER: ICD-10-PCS | Mod: S$GLB,,, | Performed by: INTERNAL MEDICINE

## 2022-10-31 PROCEDURE — 3288F FALL RISK ASSESSMENT DOCD: CPT | Mod: CPTII,S$GLB,, | Performed by: INTERNAL MEDICINE

## 2022-10-31 PROCEDURE — 3008F PR BODY MASS INDEX (BMI) DOCUMENTED: ICD-10-PCS | Mod: CPTII,S$GLB,, | Performed by: INTERNAL MEDICINE

## 2022-10-31 PROCEDURE — 3078F PR MOST RECENT DIASTOLIC BLOOD PRESSURE < 80 MM HG: ICD-10-PCS | Mod: CPTII,S$GLB,, | Performed by: INTERNAL MEDICINE

## 2022-10-31 PROCEDURE — 99999 PR PBB SHADOW E&M-EST. PATIENT-LVL IV: ICD-10-PCS | Mod: PBBFAC,,, | Performed by: INTERNAL MEDICINE

## 2022-10-31 PROCEDURE — 82306 VITAMIN D 25 HYDROXY: CPT | Performed by: INTERNAL MEDICINE

## 2022-10-31 PROCEDURE — 3008F BODY MASS INDEX DOCD: CPT | Mod: CPTII,S$GLB,, | Performed by: INTERNAL MEDICINE

## 2022-10-31 PROCEDURE — 1101F PT FALLS ASSESS-DOCD LE1/YR: CPT | Mod: CPTII,S$GLB,, | Performed by: INTERNAL MEDICINE

## 2022-10-31 RX ORDER — CHOLECALCIFEROL (VITAMIN D3) 25 MCG
4000 TABLET ORAL DAILY
COMMUNITY

## 2022-10-31 NOTE — PROGRESS NOTES
"INTERNAL MEDICINE INITIAL VISIT NOTE      CHIEF COMPLAINT     Chief Complaint   Patient presents with    Establish Care              HPI     Nano Sosa is a 67 y.o. female with pulmonary nodules, thyroid nodules, vit D deficiency, Hashimoto thyroiditis, colon polyps, osteopenia, UC in remission, rectal mass x 2 (~2014, 2016), allergies, here today to establish care. No PCP listed.     Requesting order for colonoscopy.     Ongoing intentional weight loss using [employer]. Has lost 25lbs since 5/2022.     Past Medical History:  Past Medical History:   Diagnosis Date    General anesthetics causing adverse effect in therapeutic use     wakes up hyperventilating/chills    Goiter     Hashimoto's disease     Migraine     Osteopenia     Rectal mass     Ulcerative colitis     Urinary tract infection     Vitamin D deficiency        Review of Systems:  Review of Systems   Constitutional:  Negative for chills and fever.   HENT:  Negative for congestion.    Respiratory:  Negative for cough and shortness of breath.    Cardiovascular:  Negative for chest pain.   Gastrointestinal:  Negative for constipation, nausea and vomiting.   Genitourinary:  Negative for hematuria and urgency.   Musculoskeletal:  Negative for falls.   Skin:  Negative for rash.   Neurological:  Negative for dizziness and loss of consciousness.     Health Maintenance:   Immunizations:   Influenza - complete  Tdap - 2/2020  Covid 19 - complete  HPV  Prevnar rec at 65 - 10/2020, Pneumovax 5/2022  Shingrix rec at 48 - 2nd dose    Cancer Screening:  PAP: s/p hys  Mammogram:  scheduled  Colonoscopy: 1/2019 lipoma, 1 TA; repeat now  DEXA:  10/2021 osteopenia with elevated fracture risk    PHYSICAL EXAM     /78 (BP Location: Left arm, Patient Position: Sitting, BP Method: Medium (Manual))   Pulse (!) 55   Ht 5' 7" (1.702 m)   Wt 88.5 kg (195 lb)   SpO2 97%   BMI 30.54 kg/m²     GEN - A+OX4, NAD   HEENT - PERRL, EOMI,   Neck - No thyromegaly or " thyroid masses felt.  No cervical lymphadenopathy appreciated.  CV - RRR, no m/r/g  Chest - CTAB, no wheezing, crackles, or rhonchi  Abd - S/NT/ND/+BS.   Ext - 2+BDP. No C/C/E.  Skin - Normal color and texture, no rash, no skin lesions.    ASSESSMENT/PLAN     Nano Sosa is a 67 y.o. female with conditions as above.     Visit for annual health examination  Labs ordered    Encounter to establish care with new doctor  Prior notes/labs/imaging reviewed    Low vitamin D level  -     Vitamin D; Future; Expected date: 10/31/2022    History of Hashimoto thyroiditis  Clinically and biochemically euthyroid, monitor    Rectal mass  S/p resection; repeat colonoscopy now  -     Ambulatory referral/consult to Endo Procedure ; Future; Expected date: 11/01/2022    History of colon polyps  -     Ambulatory referral/consult to Endo Procedure ; Future; Expected date: 11/01/2022    History of ulcerative colitis  -     Ambulatory referral/consult to Endo Procedure ; Future; Expected date: 11/01/2022    Thyroid nodule  US Thyroid 6/2018 - stable L nodule    Pulmonary nodules  -     CT Chest Without Contrast; Future; Expected date: 10/31/2022    Age-related osteoporosis without current pathological fracture  Discussed DEXA findings and treatment options including bisphosphonates; reviewed possible side effects  Check Vit D; if replete, consider starting treatment with Fosamax    Environmental allergies    Personal history of nicotine dependence  -     CT Chest Without Contrast; Future; Expected date: 10/31/2022     HM as above    Sujatha Washington MD  Department of Internal Medicine - [facility]  11/02/2022

## 2022-11-03 ENCOUNTER — HOSPITAL ENCOUNTER (OUTPATIENT)
Dept: RADIOLOGY | Facility: HOSPITAL | Age: 67
Discharge: HOME | End: 2022-11-03
Attending: INTERNAL MEDICINE
Payer: MEDICARE

## 2022-11-03 DIAGNOSIS — R91.8 PULMONARY NODULES: ICD-10-CM

## 2022-11-03 DIAGNOSIS — Z87.891 PERSONAL HISTORY OF NICOTINE DEPENDENCE: ICD-10-CM

## 2022-11-03 PROCEDURE — 71250 CT CHEST WITHOUT CONTRAST: ICD-10-PCS | Mod: 26,,, | Performed by: RADIOLOGY

## 2022-11-03 PROCEDURE — 71250 CT THORAX DX C-: CPT | Mod: 26,,, | Performed by: RADIOLOGY

## 2022-11-03 PROCEDURE — 71250 CT THORAX DX C-: CPT | Mod: TC

## 2022-11-30 ENCOUNTER — HOSPITAL ENCOUNTER (OUTPATIENT)
Dept: RADIOLOGY | Facility: HOSPITAL | Age: 67
Discharge: HOME | End: 2022-11-30
Attending: NURSE PRACTITIONER
Payer: MEDICARE

## 2022-11-30 VITALS — BODY MASS INDEX: 28.51 KG/M2 | WEIGHT: 182 LBS

## 2022-11-30 DIAGNOSIS — Z12.31 ENCOUNTER FOR SCREENING MAMMOGRAM FOR MALIGNANT NEOPLASM OF BREAST: ICD-10-CM

## 2022-11-30 PROCEDURE — 77063 MAMMO DIGITAL SCREENING BILAT WITH TOMO: ICD-10-PCS | Mod: 26,,, | Performed by: RADIOLOGY

## 2022-11-30 PROCEDURE — 77063 BREAST TOMOSYNTHESIS BI: CPT | Mod: TC

## 2022-11-30 PROCEDURE — 77067 SCR MAMMO BI INCL CAD: CPT | Mod: 26,,, | Performed by: RADIOLOGY

## 2022-11-30 PROCEDURE — 77067 MAMMO DIGITAL SCREENING BILAT WITH TOMO: ICD-10-PCS | Mod: 26,,, | Performed by: RADIOLOGY

## 2022-11-30 PROCEDURE — 77063 BREAST TOMOSYNTHESIS BI: CPT | Mod: 26,,, | Performed by: RADIOLOGY

## 2022-11-30 PROCEDURE — 77067 SCR MAMMO BI INCL CAD: CPT | Mod: TC

## 2022-12-07 ENCOUNTER — CLINICAL SUPPORT (OUTPATIENT)
Dept: ENDOSCOPY | Facility: HOSPITAL | Age: 67
End: 2022-12-07
Payer: MEDICARE

## 2022-12-07 DIAGNOSIS — K62.89 RECTAL MASS: ICD-10-CM

## 2022-12-07 DIAGNOSIS — Z86.010 HISTORY OF COLON POLYPS: ICD-10-CM

## 2022-12-07 DIAGNOSIS — Z87.19 HISTORY OF ULCERATIVE COLITIS: ICD-10-CM

## 2022-12-07 DIAGNOSIS — Z12.11 COLON CANCER SCREENING: Primary | ICD-10-CM

## 2022-12-07 RX ORDER — SOD SULF/POT CHLORIDE/MAG SULF 1.479 G
12 TABLET ORAL DAILY
Qty: 24 TABLET | Refills: 0 | Status: SHIPPED | OUTPATIENT
Start: 2022-12-07 | End: 2023-03-02

## 2022-12-08 ENCOUNTER — PATIENT MESSAGE (OUTPATIENT)
Dept: INTERNAL MEDICINE | Facility: CLINIC | Age: 67
End: 2022-12-08
Payer: MEDICARE

## 2023-01-06 ENCOUNTER — PES CALL (OUTPATIENT)
Dept: ADMINISTRATIVE | Facility: CLINIC | Age: 68
End: 2023-01-06
Payer: MEDICARE

## 2023-01-10 ENCOUNTER — TELEPHONE (OUTPATIENT)
Dept: ADMINISTRATIVE | Facility: CLINIC | Age: 68
End: 2023-01-10
Payer: MEDICARE

## 2023-01-12 ENCOUNTER — OFFICE VISIT (OUTPATIENT)
Dept: INTERNAL MEDICINE | Facility: CLINIC | Age: 68
End: 2023-01-12
Payer: MEDICARE

## 2023-01-12 VITALS
SYSTOLIC BLOOD PRESSURE: 116 MMHG | RESPIRATION RATE: 16 BRPM | DIASTOLIC BLOOD PRESSURE: 68 MMHG | WEIGHT: 178.63 LBS | BODY MASS INDEX: 28.04 KG/M2 | HEART RATE: 60 BPM | HEIGHT: 67 IN

## 2023-01-12 DIAGNOSIS — M81.0 AGE-RELATED OSTEOPOROSIS WITHOUT CURRENT PATHOLOGICAL FRACTURE: ICD-10-CM

## 2023-01-12 DIAGNOSIS — E04.1 THYROID NODULE: ICD-10-CM

## 2023-01-12 DIAGNOSIS — R91.8 PULMONARY NODULES: ICD-10-CM

## 2023-01-12 DIAGNOSIS — Z86.39 HISTORY OF HASHIMOTO THYROIDITIS: ICD-10-CM

## 2023-01-12 DIAGNOSIS — Z00.00 ENCOUNTER FOR PREVENTIVE HEALTH EXAMINATION: Primary | ICD-10-CM

## 2023-01-12 DIAGNOSIS — E55.9 MILD VITAMIN D DEFICIENCY: ICD-10-CM

## 2023-01-12 DIAGNOSIS — Z87.19 HISTORY OF ULCERATIVE COLITIS: ICD-10-CM

## 2023-01-12 PROBLEM — S02.2XXA NASAL FRACTURE: Status: RESOLVED | Noted: 2020-02-29 | Resolved: 2023-01-12

## 2023-01-12 PROBLEM — M25.371 RIGHT ANKLE INSTABILITY: Status: RESOLVED | Noted: 2018-03-23 | Resolved: 2023-01-12

## 2023-01-12 PROBLEM — Z12.11 SCREENING FOR COLON CANCER: Status: RESOLVED | Noted: 2019-01-07 | Resolved: 2023-01-12

## 2023-01-12 PROCEDURE — 3288F FALL RISK ASSESSMENT DOCD: CPT | Mod: CPTII,S$GLB,, | Performed by: NURSE PRACTITIONER

## 2023-01-12 PROCEDURE — 1159F PR MEDICATION LIST DOCUMENTED IN MEDICAL RECORD: ICD-10-PCS | Mod: CPTII,S$GLB,, | Performed by: NURSE PRACTITIONER

## 2023-01-12 PROCEDURE — 1159F MED LIST DOCD IN RCRD: CPT | Mod: CPTII,S$GLB,, | Performed by: NURSE PRACTITIONER

## 2023-01-12 PROCEDURE — G0439 PPPS, SUBSEQ VISIT: HCPCS | Mod: S$GLB,,, | Performed by: NURSE PRACTITIONER

## 2023-01-12 PROCEDURE — G0439 PR MEDICARE ANNUAL WELLNESS SUBSEQUENT VISIT: ICD-10-PCS | Mod: S$GLB,,, | Performed by: NURSE PRACTITIONER

## 2023-01-12 PROCEDURE — 99999 PR PBB SHADOW E&M-EST. PATIENT-LVL III: ICD-10-PCS | Mod: PBBFAC,,, | Performed by: NURSE PRACTITIONER

## 2023-01-12 PROCEDURE — 3078F DIAST BP <80 MM HG: CPT | Mod: CPTII,S$GLB,, | Performed by: NURSE PRACTITIONER

## 2023-01-12 PROCEDURE — 1160F RVW MEDS BY RX/DR IN RCRD: CPT | Mod: CPTII,S$GLB,, | Performed by: NURSE PRACTITIONER

## 2023-01-12 PROCEDURE — 3078F PR MOST RECENT DIASTOLIC BLOOD PRESSURE < 80 MM HG: ICD-10-PCS | Mod: CPTII,S$GLB,, | Performed by: NURSE PRACTITIONER

## 2023-01-12 PROCEDURE — 3008F BODY MASS INDEX DOCD: CPT | Mod: CPTII,S$GLB,, | Performed by: NURSE PRACTITIONER

## 2023-01-12 PROCEDURE — 1101F PR PT FALLS ASSESS DOC 0-1 FALLS W/OUT INJ PAST YR: ICD-10-PCS | Mod: CPTII,S$GLB,, | Performed by: NURSE PRACTITIONER

## 2023-01-12 PROCEDURE — 1170F PR FUNCTIONAL STATUS ASSESSED: ICD-10-PCS | Mod: CPTII,S$GLB,, | Performed by: NURSE PRACTITIONER

## 2023-01-12 PROCEDURE — 99999 PR PBB SHADOW E&M-EST. PATIENT-LVL III: CPT | Mod: PBBFAC,,, | Performed by: NURSE PRACTITIONER

## 2023-01-12 PROCEDURE — 3008F PR BODY MASS INDEX (BMI) DOCUMENTED: ICD-10-PCS | Mod: CPTII,S$GLB,, | Performed by: NURSE PRACTITIONER

## 2023-01-12 PROCEDURE — 1170F FXNL STATUS ASSESSED: CPT | Mod: CPTII,S$GLB,, | Performed by: NURSE PRACTITIONER

## 2023-01-12 PROCEDURE — 1126F PR PAIN SEVERITY QUANTIFIED, NO PAIN PRESENT: ICD-10-PCS | Mod: CPTII,S$GLB,, | Performed by: NURSE PRACTITIONER

## 2023-01-12 PROCEDURE — 1101F PT FALLS ASSESS-DOCD LE1/YR: CPT | Mod: CPTII,S$GLB,, | Performed by: NURSE PRACTITIONER

## 2023-01-12 PROCEDURE — 1160F PR REVIEW ALL MEDS BY PRESCRIBER/CLIN PHARMACIST DOCUMENTED: ICD-10-PCS | Mod: CPTII,S$GLB,, | Performed by: NURSE PRACTITIONER

## 2023-01-12 PROCEDURE — 3074F SYST BP LT 130 MM HG: CPT | Mod: CPTII,S$GLB,, | Performed by: NURSE PRACTITIONER

## 2023-01-12 PROCEDURE — 3288F PR FALLS RISK ASSESSMENT DOCUMENTED: ICD-10-PCS | Mod: CPTII,S$GLB,, | Performed by: NURSE PRACTITIONER

## 2023-01-12 PROCEDURE — 1126F AMNT PAIN NOTED NONE PRSNT: CPT | Mod: CPTII,S$GLB,, | Performed by: NURSE PRACTITIONER

## 2023-01-12 PROCEDURE — 3074F PR MOST RECENT SYSTOLIC BLOOD PRESSURE < 130 MM HG: ICD-10-PCS | Mod: CPTII,S$GLB,, | Performed by: NURSE PRACTITIONER

## 2023-01-12 NOTE — PATIENT INSTRUCTIONS
Counseling and Referral of Other Preventative  (Italic type indicates deductible and co-insurance are waived)    Patient Name: Nano Sosa  Today's Date: 1/12/2023    Health Maintenance       Date Due Completion Date    DEXA Scan 10/27/2023 10/27/2021    Mammogram 11/30/2023 11/30/2022    Colorectal Cancer Screening 01/07/2024 1/7/2019    Lipid Panel 10/11/2026 10/11/2021        No orders of the defined types were placed in this encounter.    The following information is provided to all patients.  This information is to help you find resources for any of the problems found today that may be affecting your health:                Living healthy guide: www.[facility].[place].gov      Understanding Diabetes: www.diabetes.org      Eating healthy: www.cdc.gov/healthyweight      CDC home safety checklist: www.cdc.gov/steadi/patient.html      Agency on Aging: www.goea.[place].[facility]      Alcoholics anonymous (AA): www.aa.org      Physical Activity: www.[place]amilka.nih.gov/ot6resh      Tobacco use: www.quitwithusla.org

## 2023-01-12 NOTE — PROGRESS NOTES
"  Nano Sosa presented for a  Medicare AWV and comprehensive Health Risk Assessment today. The following components were reviewed and updated:    Medical history  Family History  Social history  Allergies and Current Medications  Health Risk Assessment  Health Maintenance  Care Team         ** See Completed Assessments for Annual Wellness Visit within the encounter summary.**         The following assessments were completed:  Living Situation  CAGE  Depression Screening  Timed Get Up and Go  Whisper Test  Cognitive Function Screening      Nutrition Screening  ADL Screening  PAQ Screening        Vitals:    01/12/23 0808   BP: 116/68   Pulse: 60   Resp: 16   Weight: 81 kg (178 lb 9.6 oz)   Height: 5' 7" (1.702 m)   Pain 0/10    Body mass index is 27.97 kg/m².  Physical Exam  Vitals reviewed.   Cardiovascular:      Rate and Rhythm: Normal rate and regular rhythm.      Pulses: Normal pulses.      Heart sounds: Normal heart sounds.   Pulmonary:      Effort: Pulmonary effort is normal.      Breath sounds: Normal breath sounds.   Abdominal:      General: Bowel sounds are normal.      Palpations: Abdomen is soft.      Tenderness: There is no abdominal tenderness.   Skin:     General: Skin is warm.      Capillary Refill: Capillary refill takes less than 2 seconds.   Neurological:      Mental Status: She is alert.             Diagnoses and health risks identified today and associated recommendations/orders:    1. Encounter for preventive health examination  - Chart reviewed. Problem list updated. Discussed current medical diagnosis, current medications, medical/surgical/family/social history; updated provider list; documented vital signs; identified any cognitive impairment; and updated risk factor list. Addressed any outstanding health maintenance. Provided patient with personalized health advice. Continue to follow up with PCP and any specialists.     2. History of Hashimoto thyroiditis  Euthyroid at this time. " Continue to follow up with PCP.    3. History of ulcerative colitis  Asymptomatic without medications. Continue to follow up with GI.     4. Thyroid nodule  Stable. Continue to follow up with PCP.     5. Pulmonary nodules  Stable per last CT 10/31/2022. Continue to follow up with PCP.     6. Age-related osteoporosis without current pathological fracture  Discussed treatment with PCP. Encouraged weight-bearing exercise. Continue to follow up with PCP.     7. Mild vitamin D deficiency  Improving on supplementation. Continue to follow up with PCP.       Provided Nano with a 5-10 year written screening schedule and personal prevention plan. Recommendations were developed using the USPSTF age appropriate recommendations. Education, counseling, and referrals were provided as needed. After Visit Summary printed and given to patient which includes a list of additional screenings\tests needed.    Follow up for your next annual wellness visit.    Kacy Garza NP  I offered to discuss advanced care planning, including how to pick a person who would make decisions for you if you were unable to make them for yourself, called a health care power of , and what kind of decisions you might make such as use of life sustaining treatments such as ventilators and tube feeding when faced with a life limiting illness recorded on a living will that they will need to know. (How you want to be cared for as you near the end of your natural life)     X  Patient has advanced directives written and agrees to provide copies to the institution.

## 2023-02-13 ENCOUNTER — ANESTHESIA EVENT (OUTPATIENT)
Dept: ENDOSCOPY | Facility: HOSPITAL | Age: 68
End: 2023-02-13
Payer: MEDICARE

## 2023-02-13 ENCOUNTER — ANESTHESIA (OUTPATIENT)
Dept: ENDOSCOPY | Facility: HOSPITAL | Age: 68
End: 2023-02-13
Payer: MEDICARE

## 2023-02-13 ENCOUNTER — HOSPITAL ENCOUNTER (OUTPATIENT)
Facility: HOSPITAL | Age: 68
Discharge: HOME | End: 2023-02-13
Attending: COLON & RECTAL SURGERY | Admitting: INTERNAL MEDICINE
Payer: MEDICARE

## 2023-02-13 VITALS
WEIGHT: 170 LBS | HEART RATE: 49 BPM | OXYGEN SATURATION: 99 % | BODY MASS INDEX: 26.68 KG/M2 | SYSTOLIC BLOOD PRESSURE: 103 MMHG | RESPIRATION RATE: 16 BRPM | DIASTOLIC BLOOD PRESSURE: 57 MMHG | TEMPERATURE: 98 F | HEIGHT: 67 IN

## 2023-02-13 DIAGNOSIS — K63.5 COLON POLYPS: ICD-10-CM

## 2023-02-13 DIAGNOSIS — K63.5 POLYP OF COLON, UNSPECIFIED PART OF COLON, UNSPECIFIED TYPE: Primary | ICD-10-CM

## 2023-02-13 PROCEDURE — 25000003 PHARM REV CODE 250: Performed by: NURSE ANESTHETIST, CERTIFIED REGISTERED

## 2023-02-13 PROCEDURE — 37000009 HC ANESTHESIA EA ADD 15 MINS: Performed by: INTERNAL MEDICINE

## 2023-02-13 PROCEDURE — E9220 PRA ENDO ANESTHESIA: ICD-10-PCS | Mod: PT,,, | Performed by: NURSE ANESTHETIST, CERTIFIED REGISTERED

## 2023-02-13 PROCEDURE — 45380 COLONOSCOPY AND BIOPSY: CPT | Mod: PT | Performed by: INTERNAL MEDICINE

## 2023-02-13 PROCEDURE — 63600175 PHARM REV CODE 636 W HCPCS: Performed by: NURSE ANESTHETIST, CERTIFIED REGISTERED

## 2023-02-13 PROCEDURE — 88305 TISSUE EXAM BY PATHOLOGIST: CPT | Mod: 59 | Performed by: PATHOLOGY

## 2023-02-13 PROCEDURE — 88305 TISSUE EXAM BY PATHOLOGIST: CPT | Mod: 26,,, | Performed by: PATHOLOGY

## 2023-02-13 PROCEDURE — 45380 COLONOSCOPY AND BIOPSY: CPT | Mod: PT,,, | Performed by: INTERNAL MEDICINE

## 2023-02-13 PROCEDURE — E9220 PRA ENDO ANESTHESIA: HCPCS | Mod: PT,,, | Performed by: NURSE ANESTHETIST, CERTIFIED REGISTERED

## 2023-02-13 PROCEDURE — 88305 TISSUE EXAM BY PATHOLOGIST: ICD-10-PCS | Mod: 26,,, | Performed by: PATHOLOGY

## 2023-02-13 PROCEDURE — 27201012 HC FORCEPS, HOT/COLD, DISP: Performed by: INTERNAL MEDICINE

## 2023-02-13 PROCEDURE — 45380 PR COLONOSCOPY,BIOPSY: ICD-10-PCS | Mod: PT,,, | Performed by: INTERNAL MEDICINE

## 2023-02-13 PROCEDURE — 37000008 HC ANESTHESIA 1ST 15 MINUTES: Performed by: INTERNAL MEDICINE

## 2023-02-13 RX ORDER — LIDOCAINE HYDROCHLORIDE 20 MG/ML
INJECTION INTRAVENOUS
Status: DISCONTINUED | OUTPATIENT
Start: 2023-02-13 | End: 2023-02-13

## 2023-02-13 RX ORDER — PROPOFOL 10 MG/ML
VIAL (ML) INTRAVENOUS
Status: DISCONTINUED | OUTPATIENT
Start: 2023-02-13 | End: 2023-02-13

## 2023-02-13 RX ORDER — SODIUM CHLORIDE 9 MG/ML
INJECTION, SOLUTION INTRAVENOUS CONTINUOUS
Status: DISCONTINUED | OUTPATIENT
Start: 2023-02-13 | End: 2023-02-13 | Stop reason: HOSPADM

## 2023-02-13 RX ORDER — PROPOFOL 10 MG/ML
VIAL (ML) INTRAVENOUS CONTINUOUS PRN
Status: DISCONTINUED | OUTPATIENT
Start: 2023-02-13 | End: 2023-02-13

## 2023-02-13 RX ADMIN — Medication 150 MCG/KG/MIN: at 09:02

## 2023-02-13 RX ADMIN — GLYCOPYRROLATE 0.2 MG: 0.2 INJECTION, SOLUTION INTRAMUSCULAR; INTRAVENOUS at 09:02

## 2023-02-13 RX ADMIN — SODIUM CHLORIDE: 0.9 INJECTION, SOLUTION INTRAVENOUS at 08:02

## 2023-02-13 RX ADMIN — LIDOCAINE HYDROCHLORIDE 100 MG: 20 INJECTION INTRAVENOUS at 09:02

## 2023-02-13 RX ADMIN — PROPOFOL 100 MG: 10 INJECTION, EMULSION INTRAVENOUS at 09:02

## 2023-02-13 NOTE — H&P
Short Stay Endoscopy History and Physical    PCP - Tana Washington MD  Referring Physician - Madiha Garcia, NP  [address] [place] [address]    Procedure - Colonoscopy  ASA - per anesthesia  Mallampati - per anesthesia  History of Anesthesia problems - no  Family history Anesthesia problems -  no   Plan of anesthesia - General    HPI  67 y.o. female  Reason for procedure:       ROS:  Constitutional: No fevers, chills, No weight loss  CV: No chest pain  Pulm: No cough, No shortness of breath  GI: see HPI    Medical History:  has a past medical history of General anesthetics causing adverse effect in therapeutic use, Goiter, Hashimoto's disease, Migraine, Osteopenia, Rectal mass, Ulcerative colitis, Urinary tract infection, and Vitamin D deficiency.    Surgical History:  has a past surgical history that includes Facial cosmetic surgery; Bunionectomy; f.e.s.s. (12/5/2014); colon sx; Colonoscopy (N/A, 12/19/2016); Total abdominal hysterectomy (1994); and Colonoscopy (N/A, 1/7/2019).    Family History: family history includes Breast cancer in her maternal aunt; Colon cancer in her maternal aunt; Heart disease in her maternal uncle; Hypertension in her maternal uncle; Migraines in her mother; Rheum arthritis in her mother..    Social History:  reports that she quit smoking about 23 years ago. Her smoking use included cigarettes. She has a 30.00 pack-year smoking history. She has never used smokeless tobacco. She reports current alcohol use of about 6.0 standard drinks per week. She reports that she does not use drugs.    Review of patient's allergies indicates:   Allergen Reactions    Penicillins Shortness Of Breath     Throat swelling    Poison ivy extract Dermatitis, Hives, Itching, Rash and Swelling    Poison sumac extract Blisters, Dermatitis, Hives, Itching, Rash and Swelling    Sulfa (sulfonamide antibiotics) Hives and Rash    Codeine Itching     Hallucinations, sweats    Erythromycin  Itching and Swelling    Tessalon [benzonatate] Swelling    Unable to assess      TUNA--Flushing of skin       Medications:   Medications Prior to Admission   Medication Sig Dispense Refill Last Dose    b complex vitamins tablet Take 1 tablet by mouth once daily.   Past Week    multivitamin capsule Take 1 capsule by mouth once daily.   Past Week    sod sulf-pot chloride-mag sulf (SUTAB) 1.479-0.188- 0.225 gram tablet Take 12 tablets by mouth once daily. Please follow given instructions sent by MD office. COUPON  BIN  [id number] N  CN  Group  [id number]  Member ID  [id number] 24 tablet 0 2/13/2023    vitamin D (VITAMIN D3) 1000 units Tab Take 4,000 Units by mouth once daily.   Past Week    diclofenac sodium 1 % Gel Apply 2 g topically 4 (four) times daily. 400 g 0     ketoconazole (NIZORAL) 2 % cream Apply topically 2 (two) times daily. 60 g 1        Physical Exam:    Vital Signs:   Vitals:    02/13/23 0753   BP: 134/65   Pulse: (!) 56   Resp: 15   Temp: 97.5 °F (36.4 °C)       General Appearance: Well appearing in no acute distress  Abdomen: Soft, non tender, non distended with normal bowel sounds, no masses    Labs:  Lab Results   Component Value Date    WBC 6.88 05/18/2022    HGB 12.1 05/18/2022    HCT 38.8 05/18/2022     05/18/2022    CHOL 200 (H) 10/11/2021    TRIG 118 10/11/2021    HDL 69 10/11/2021    ALT 18 05/18/2022    AST 19 05/18/2022     05/18/2022    K 4.1 05/18/2022     05/18/2022    CREATININE 0.7 05/18/2022    BUN 17 05/18/2022    CO2 26 05/18/2022    TSH 2.746 05/18/2022    HGBA1C 5.3 12/18/2015       I have explained the risks and benefits of this endoscopic procedure to the patient including but not limited to bleeding, inflammation, infection, perforation, and death.      Srinivas Linares MD

## 2023-02-13 NOTE — PROVATION PATIENT INSTRUCTIONS
Discharge Summary/Instructions after an Endoscopic Procedure  Patient Name: Nano Sosa  Patient MRN: [id number]  Patient YOB: 1955 Monday, February 13, 2023  Srinivas Linares MD  Dear patient,  As a result of recent federal legislation (The Federal Cures Act), you may   receive lab or pathology results from your procedure in your [facility]   account before your physician is able to contact you. Your physician or   their representative will relay the results to you with their   recommendations at their soonest availability.  Thank you,  RESTRICTIONS:  During your procedure today, you received medications for sedation.  These   medications may affect your judgment, balance and coordination.  Therefore,   for 24 hours, you have the following restrictions:   - DO NOT drive a car, operate machinery, make legal/financial decisions,   sign important papers or drink alcohol.    ACTIVITY:  Today: no heavy lifting, straining or running due to procedural   sedation/anesthesia.  The following day: return to full activity including work.  DIET:  Eat and drink normally unless instructed otherwise.     TREATMENT FOR COMMON SIDE EFFECTS:  - Mild abdominal pain, nausea, belching, bloating or excessive gas:  rest,   eat lightly and use a heating pad.  - Sore Throat: treat with throat lozenges and/or gargle with warm salt   water.  - Because air was used during the procedure, expelling large amounts of air   from your rectum or belching is normal.  - If a bowel prep was taken, you may not have a bowel movement for 1-3 days.    This is normal.  SYMPTOMS TO WATCH FOR AND REPORT TO YOUR PHYSICIAN:  1. Abdominal pain or bloating, other than gas cramps.  2. Chest pain.  3. Back pain.  4. Signs of infection such as: chills or fever occurring within 24 hours   after the procedure.  5. Rectal bleeding, which would show as bright red, maroon, or black stools.   (A tablespoon of blood from the rectum is not serious, especially  [place] Depression Screen  Score: 0      Signatures   Electronically signed by : DAISY BERMAN MD; Oct 25 2018 10:35AM CST     if   hemorrhoids are present.)  6. Vomiting.  7. Weakness or dizziness.  GO DIRECTLY TO THE NEAREST EMERGENCY ROOM IF YOU HAVE ANY OF THE FOLLOWING:      Difficulty breathing              Chills and/or fever over 101 F   Persistent vomiting and/or vomiting blood   Severe abdominal pain   Severe chest pain   Black, tarry stools   Bleeding- more than one tablespoon   Any other symptom or condition that you feel may need urgent attention  Your doctor recommends these additional instructions:  If any biopsies were taken, your doctors clinic will contact you in 1 to 2   weeks with any results.  - Discharge patient to home.   - Patient has a contact number available for emergencies.  The signs and   symptoms of potential delayed complications were discussed with the   patient.  Return to normal activities tomorrow.  Written discharge   instructions were provided to the patient.   - Resume previous diet.   - Continue present medications.   - Await pathology results.   - Repeat colonoscopy date to be determined after pending pathology results   are reviewed for surveillance based on pathology results.  For questions, problems or results please call your physician - Srinivas Linares MD at Work:  [phone].  [facility], EMERGENCY ROOM PHONE NUMBER: [phone]  IF A COMPLICATION OR EMERGENCY SITUATION ARISES AND YOU ARE UNABLE TO REACH   YOUR PHYSICIAN - GO DIRECTLY TO THE EMERGENCY ROOM.  Srinivas Linares MD  2/13/2023 9:52:14 AM  This report has been verified and signed electronically.  Dear patient,  As a result of recent federal legislation (The Federal Cures Act), you may   receive lab or pathology results from your procedure in your [facility]   account before your physician is able to contact you. Your physician or   their representative will relay the results to you with their   recommendations at their soonest availability.  Thank you,  [employer]ATION

## 2023-02-13 NOTE — TRANSFER OF CARE
"Anesthesia Transfer of Care Note    Patient: Nano Sosa    Procedure(s) Performed: Procedure(s) (LRB):  COLONOSCOPY (N/A)    Patient location: GI    Anesthesia Type: general    Transport from OR: Transported from OR on room air with adequate spontaneous ventilation    Post pain: adequate analgesia    Post assessment: no apparent anesthetic complications    Post vital signs: stable    Level of consciousness: awake    Nausea/Vomiting: no nausea/vomiting    Complications: none    Transfer of care protocol was followed      Last vitals:   Visit Vitals  BP (!) 116/56 (BP Location: Left arm, Patient Position: Lying)   Pulse (!) 50   Temp 36.7 °C (98.1 °F) (Oral)   Resp 16   Ht 5' 7" (1.702 m)   Wt 77.1 kg (170 lb)   SpO2 100%   Breastfeeding No   BMI 26.63 kg/m²     "

## 2023-02-13 NOTE — ANESTHESIA PREPROCEDURE EVALUATION
02/13/2023  Nano Sosa is a 67 y.o., female.      Patient Name: Nano Sosa  YOB: 1955  MRN: [id number]  [facility]: [id number]      Code Status: Prior   Date of Procedure: 2/13/2023  Anesthesia: Choice Procedure: Procedure(s) (LRB):  COLONOSCOPY (N/A)  Pre-Operative Diagnosis: History of colon polyps [Z86.010]  Proceduralist: Surgeon(s) and Role:     * Deejay Ramsay MD - Primary        SUBJECTIVE:   Nano Sosa is a 67 y.o. female who  has a past medical history of General anesthetics causing adverse effect in therapeutic use, Goiter, Hashimoto's disease, Migraine, Osteopenia, Rectal mass, Ulcerative colitis, Urinary tract infection, and Vitamin D deficiency. No notes on file    ALLERGIES:     Review of patient's allergies indicates:   Allergen Reactions    Penicillins Shortness Of Breath     Throat swelling    Poison ivy extract Dermatitis, Hives, Itching, Rash and Swelling    Poison sumac extract Blisters, Dermatitis, Hives, Itching, Rash and Swelling    Sulfa (sulfonamide antibiotics) Hives and Rash    Codeine Itching     Hallucinations, sweats    Erythromycin Itching and Swelling    Tessalon [benzonatate] Swelling    Unable to assess      TUNA--Flushing of skin     MEDICATIONS:     Current Facility-Administered Medications   Medication Dose Route Frequency Provider Last Rate Last Admin    0.9%  NaCl infusion   Intravenous Continuous Deejay Ramsay MD              History:     Patient Active Problem List   Diagnosis    Thyroid nodule    History of Hashimoto thyroiditis    Environmental allergies    History of colon polyps    Mild vitamin D deficiency    Rectal mass    Pulmonary nodules     Surgical History:    has a past surgical history that includes Facial cosmetic surgery; Bunionectomy; f.e.s.s. (12/5/2014); colon sx; Colonoscopy (N/A, 12/19/2016); Total  abdominal hysterectomy (1994); and Colonoscopy (N/A, 1/7/2019).   Social History:    reports that she is not currently sexually active and has had partner(s) who are male. She reports using the following method of birth control/protection: See Surgical Hx.  reports that she quit smoking about 23 years ago. Her smoking use included cigarettes. She has a 30.00 pack-year smoking history. She has never used smokeless tobacco. She reports current alcohol use of about 6.0 standard drinks per week. She reports that she does not use drugs.       Pre-op Assessment    I have reviewed the Patient Summary Reports.     I have reviewed the Nursing Notes. I have reviewed the NPO Status.   I have reviewed the Medications.     Review of Systems  Anesthesia Hx:  No problems with previous Anesthesia  Denies Family Hx of Anesthesia complications.   Denies Personal Hx of Anesthesia complications.   Hematology/Oncology:  Hematology Normal        Cardiovascular:  Cardiovascular Normal     Hepatic/GI:   Bowel Prep. PUD,    Musculoskeletal:  Musculoskeletal Normal    Neurological:  Neurology Normal    Dermatological:  Skin Normal        Physical Exam  General: Cooperative, Alert and Oriented    Airway:  Mallampati: II   Mouth Opening: Normal  TM Distance: Normal  Tongue: Normal  Neck ROM: Normal ROM    Dental:  Intact        Anesthesia Plan  Type of Anesthesia, risks & benefits discussed:    Anesthesia Type: Gen Natural Airway  Intra-op Monitoring Plan: Standard ASA Monitors  Induction:  IV  Informed Consent: Informed consent signed with the Patient and all parties understand the risks and agree with anesthesia plan.  All questions answered.   ASA Score: 2  Day of Surgery Review of History & Physical: H&P Update referred to the surgeon/provider.I have interviewed and examined the patient. I have reviewed the patient's H&P dated: There are no significant changes.     Ready For Surgery From Anesthesia Perspective.     .

## 2023-02-15 NOTE — ANESTHESIA POSTPROCEDURE EVALUATION
Anesthesia Post Evaluation    Patient: Nano Sosa    Procedure(s) Performed: Procedure(s) (LRB):  COLONOSCOPY (N/A)    Final Anesthesia Type: general      Patient location during evaluation: GI PACU  Patient participation: Yes- Able to Participate  Level of consciousness: awake and alert  Post-procedure vital signs: reviewed and stable  Pain management: adequate  Airway patency: patent    PONV status at discharge: No PONV  Anesthetic complications: no      Cardiovascular status: stable  Respiratory status: unassisted and spontaneous ventilation  Hydration status: euvolemic  Follow-up not needed.          Vitals Value Taken Time   /57 02/13/23 1012   Temp 36.7 °C (98.1 °F) 02/13/23 0952   Pulse 49 02/13/23 1012   Resp 16 02/13/23 1012   SpO2 99 % 02/13/23 1012         Event Time   Out of Recovery 10:21:00         Pain/Debi Score: No data recorded

## 2023-02-16 LAB
FINAL PATHOLOGIC DIAGNOSIS: NORMAL
GROSS: NORMAL
Lab: NORMAL

## 2023-02-22 ENCOUNTER — PATIENT MESSAGE (OUTPATIENT)
Dept: GASTROENTEROLOGY | Facility: CLINIC | Age: 68
End: 2023-02-22
Payer: MEDICARE

## 2023-02-23 ENCOUNTER — PATIENT MESSAGE (OUTPATIENT)
Dept: GASTROENTEROLOGY | Facility: CLINIC | Age: 68
End: 2023-02-23
Payer: MEDICARE

## 2023-02-27 ENCOUNTER — TELEPHONE (OUTPATIENT)
Dept: GASTROENTEROLOGY | Facility: CLINIC | Age: 68
End: 2023-02-27
Payer: MEDICARE

## 2023-03-02 ENCOUNTER — OFFICE VISIT (OUTPATIENT)
Dept: GASTROENTEROLOGY | Facility: CLINIC | Age: 68
End: 2023-03-02
Payer: MEDICARE

## 2023-03-02 VITALS
SYSTOLIC BLOOD PRESSURE: 117 MMHG | WEIGHT: 172.69 LBS | DIASTOLIC BLOOD PRESSURE: 72 MMHG | BODY MASS INDEX: 27.1 KG/M2 | HEIGHT: 67 IN | TEMPERATURE: 98 F | HEART RATE: 58 BPM

## 2023-03-02 DIAGNOSIS — M81.0 OSTEOPOROSIS, UNSPECIFIED OSTEOPOROSIS TYPE, UNSPECIFIED PATHOLOGICAL FRACTURE PRESENCE: ICD-10-CM

## 2023-03-02 DIAGNOSIS — K51.90 ULCERATIVE COLITIS WITHOUT COMPLICATIONS, UNSPECIFIED LOCATION: Primary | ICD-10-CM

## 2023-03-02 PROCEDURE — 3008F PR BODY MASS INDEX (BMI) DOCUMENTED: ICD-10-PCS | Mod: CPTII,S$GLB,, | Performed by: INTERNAL MEDICINE

## 2023-03-02 PROCEDURE — 1101F PR PT FALLS ASSESS DOC 0-1 FALLS W/OUT INJ PAST YR: ICD-10-PCS | Mod: CPTII,S$GLB,, | Performed by: INTERNAL MEDICINE

## 2023-03-02 PROCEDURE — 99215 PR OFFICE/OUTPT VISIT, EST, LEVL V, 40-54 MIN: ICD-10-PCS | Mod: S$GLB,,, | Performed by: INTERNAL MEDICINE

## 2023-03-02 PROCEDURE — 1160F RVW MEDS BY RX/DR IN RCRD: CPT | Mod: CPTII,S$GLB,, | Performed by: INTERNAL MEDICINE

## 2023-03-02 PROCEDURE — 1126F AMNT PAIN NOTED NONE PRSNT: CPT | Mod: CPTII,S$GLB,, | Performed by: INTERNAL MEDICINE

## 2023-03-02 PROCEDURE — 99215 OFFICE O/P EST HI 40 MIN: CPT | Mod: S$GLB,,, | Performed by: INTERNAL MEDICINE

## 2023-03-02 PROCEDURE — 3074F SYST BP LT 130 MM HG: CPT | Mod: CPTII,S$GLB,, | Performed by: INTERNAL MEDICINE

## 2023-03-02 PROCEDURE — 1160F PR REVIEW ALL MEDS BY PRESCRIBER/CLIN PHARMACIST DOCUMENTED: ICD-10-PCS | Mod: CPTII,S$GLB,, | Performed by: INTERNAL MEDICINE

## 2023-03-02 PROCEDURE — 1159F PR MEDICATION LIST DOCUMENTED IN MEDICAL RECORD: ICD-10-PCS | Mod: CPTII,S$GLB,, | Performed by: INTERNAL MEDICINE

## 2023-03-02 PROCEDURE — 3074F PR MOST RECENT SYSTOLIC BLOOD PRESSURE < 130 MM HG: ICD-10-PCS | Mod: CPTII,S$GLB,, | Performed by: INTERNAL MEDICINE

## 2023-03-02 PROCEDURE — 3008F BODY MASS INDEX DOCD: CPT | Mod: CPTII,S$GLB,, | Performed by: INTERNAL MEDICINE

## 2023-03-02 PROCEDURE — 1126F PR PAIN SEVERITY QUANTIFIED, NO PAIN PRESENT: ICD-10-PCS | Mod: CPTII,S$GLB,, | Performed by: INTERNAL MEDICINE

## 2023-03-02 PROCEDURE — 3288F PR FALLS RISK ASSESSMENT DOCUMENTED: ICD-10-PCS | Mod: CPTII,S$GLB,, | Performed by: INTERNAL MEDICINE

## 2023-03-02 PROCEDURE — 3288F FALL RISK ASSESSMENT DOCD: CPT | Mod: CPTII,S$GLB,, | Performed by: INTERNAL MEDICINE

## 2023-03-02 PROCEDURE — 3078F DIAST BP <80 MM HG: CPT | Mod: CPTII,S$GLB,, | Performed by: INTERNAL MEDICINE

## 2023-03-02 PROCEDURE — 3078F PR MOST RECENT DIASTOLIC BLOOD PRESSURE < 80 MM HG: ICD-10-PCS | Mod: CPTII,S$GLB,, | Performed by: INTERNAL MEDICINE

## 2023-03-02 PROCEDURE — 1159F MED LIST DOCD IN RCRD: CPT | Mod: CPTII,S$GLB,, | Performed by: INTERNAL MEDICINE

## 2023-03-02 PROCEDURE — 1101F PT FALLS ASSESS-DOCD LE1/YR: CPT | Mod: CPTII,S$GLB,, | Performed by: INTERNAL MEDICINE

## 2023-03-02 RX ORDER — IBUPROFEN 200 MG
400 TABLET ORAL EVERY 8 HOURS PRN
COMMUNITY
End: 2023-12-05

## 2023-03-02 NOTE — PROGRESS NOTES
[facility]          Inflammatory Bowel Disease          New Patient Note         TODAY'S VISIT DATE:  3/2/2023    Reason for Consult:    Chief Complaint   Patient presents with    Ulcerative Colitis     PCP: Tana Washington      Referring MD:   Leatha Self    History of Present Illness:  Nano Sosa who is a 67 y.o. female seen today at the [facility] on 03/02/2023. Pertinent past medical history includes Hashimito's thyroiditis, migraines, osteoporosis, ulcerative colitis, colon adenomas. She was doing well until 2004 when she had bloody diarrhea and had a colonoscopy and diagnosed with left sided ulcerative colitis and was prescribed oral steroids and a suppository though pt concerned about taking medicine and did not take this.  In 2004 in [place] she met with a holistic doctor and she was given a expensive/extensive regimen and it resolved all of her symptoms within 6-8 mos. She attributes her initial symptoms within 6-12 mos after increased stress related to take care of elderly parent.   She then was taken care of by Dr. Quesada at [facility] and transferred her care to [facility] in 2011 and saw Dr. Miller at which time she was having 2-3 BMs/d with occ BRB painless on TP.   Colonoscopy 11/2014 c/w localized mild inflammation with edema and granularity in the rectum with 3 cm rectal polyp 10 cm proximal to anus that was biopsied and c/w tubular adenoma and rectal biopsies c/w mild chronic active inflammation with remaind er of the colon and TI endoscopically normal. On 12/24/14, 7/20/16, Dr. Peña did transanal endoscopic rectal resection (TAMIS) of the polyp. Colonoscopy 12/19/16 showed 4 mm sigmoid tubular adenoma and 6 mm rectal tubular adenoma both removed completely with jumbo forceps. Remainder of the colon was normal though not biopsies taken. Colonoscopy 1/7/19 c/w 5 mm flat sigmoid tubular adenoma removed with hot snare,   cecum lipoma and post- polypectomy mid rectal scar. I met patient for first time for open access surveillance colonoscopy 23 at which time there was some patchy erytehma with punctate hemorrhage in the distal 5 cm of the rectum, localized hemicircumferential scarring 10 cm from anal verge with remainder of the colon normal. There were 2 small diminutive polyps removed from the descending and ascending colon (tubular adenomas), TI normal. Rectal biopsies reactive/regenerative changes. Currently 2-3 soft BMs/d, no blood, no urgency, no nocturnal BMs. Patient is having 2 soft BMs/d, no blood, no urgency, no nocturnal BMs.     Prior Pertinent Surgeries:   None    Pertinent Endoscopy/Imagin23 colonoscopy: some patchy erytehma with punctate hemorrhage in the distal 5 cm of the rectum, localized hemicircumferential scarring 10 cm from anal verge with remainder of the colon normal. There were 2 small diminutive polyps removed from the descending and ascending colon (tubular adenomas), TI normal. Rectal biopsies reactive/regenerative changes    Therapeutic Drug Monitoring Labs:  NA    Prior IBD Therapies:  Holistic treatments- specifics not know- effective     Current IBD Therapies:  None    Vaccinations:  Immunization History   Administered Date(s) Administered    COVID-19, MRNA, LN-S, PF (Pfizer) (Gray Cap) 2022    COVID-19, MRNA, LN-S, PF (Pfizer) (Purple Cap) 2021, 03/15/2021, 10/07/2021    COVID-19, mRNA, LNP-S, bivalent booster, PF (PFIZER OMICRON) 10/06/2022    Influenza (FLUAD) - Quadrivalent - Adjuvanted - PF *Preferred* (65+) 10/12/2020, 10/07/2021, 10/06/2022    Influenza - Trivalent (ADULT) 2013    Influenza Split 2013    Pneumococcal Conjugate - 13 Valent 10/12/2020    Pneumococcal Polysaccharide - 23 Valent 2022    Tdap 2020    Zoster 2015    Zoster Recombinant 2022, 10/31/2022     Influenza (inactive):  recommended annually  HPV: NA      Meningococcal: NA  Hepatitis B: will check immunity     Hepatitis A:  will check immunity     MMR (live vaccine): will check immunity          Chickenpox status/Varicella (live vaccine):  will check immunity       Review of Systems   Constitutional:  Negative for chills, fever and weight loss.   HENT:          No oral ulcers, dysphagia, oral thrush   Eyes:  Negative for blurred vision, pain and redness.   Respiratory:  Negative for cough and shortness of breath.    Cardiovascular:  Negative for chest pain.   Gastrointestinal:  Negative for abdominal pain, heartburn, nausea and vomiting.   Genitourinary:  Negative for dysuria and hematuria.   Musculoskeletal:  Negative for back pain and joint pain.   Skin:  Negative for rash.   Psychiatric/Behavioral:  Negative for depression. The patient is not nervous/anxious and does not have insomnia.      Medical/Surgical History:    has a past medical history of General anesthetics causing adverse effect in therapeutic use, Goiter, Hashimoto's disease, Migraine, Osteopenia, Rectal mass, Ulcerative colitis, Urinary tract infection, and Vitamin D deficiency.    has a past surgical history that includes Facial cosmetic surgery; Bunionectomy; f.e.s.s. (12/5/2014); colon sx; Colonoscopy (N/A, 12/19/2016); Total abdominal hysterectomy (1994); Colonoscopy (N/A, 1/7/2019); and Colonoscopy (N/A, 2/13/2023).     Family History:   family history includes Breast cancer in her maternal aunt; Colon cancer in her maternal aunt; Heart disease in her maternal uncle; Hypertension in her maternal uncle; Migraines in her mother; Rheum arthritis in her mother.     Social History:    reports that she quit smoking about 23 years ago. Her smoking use included cigarettes. She has a 30.00 pack-year smoking history. She has never been exposed to tobacco smoke. She has never used smokeless tobacco. She reports current alcohol use of about 6.0 standard drinks per week. She reports that she does not use  "drugs.     Review of patient's allergies indicates:   Allergen Reactions    Penicillins Shortness Of Breath     Throat swelling    Poison ivy extract Dermatitis, Hives, Itching, Rash and Swelling    Poison sumac extract Blisters, Dermatitis, Hives, Itching, Rash and Swelling    Sulfa (sulfonamide antibiotics) Hives and Rash    Codeine Itching     Hallucinations, sweats    Erythromycin Itching and Swelling    Tessalon [benzonatate] Swelling    Unable to assess      TUNA--Flushing of skin     Current Medications:   Outpatient Medications Marked as Taking for the 3/2/23 encounter (Office Visit) with Srinivas Linares MD   Medication Sig Dispense Refill    b complex vitamins tablet Take 1 tablet by mouth once daily.      ibuprofen (ADVIL,MOTRIN) 200 MG tablet Take 400 mg by mouth every 8 (eight) hours as needed for Pain.      multivitamin capsule Take 1 capsule by mouth once daily.      vitamin D (VITAMIN D3) 1000 units Tab Take 4,000 Units by mouth once daily.        Vital Signs:  /72 (BP Location: Left arm, Patient Position: Sitting)   Pulse (!) 58   Temp 97.7 °F (36.5 °C)   Ht 5' 7" (1.702 m)   Wt 78.3 kg (172 lb 11.2 oz)   PF 99 L/min   BMI 27.05 kg/m²      Physical Exam  Constitutional:       General: She is not in acute distress.  Cardiovascular:      Rate and Rhythm: Normal rate and regular rhythm.      Pulses: Normal pulses.      Heart sounds: Normal heart sounds. No murmur heard.  Pulmonary:      Effort: Pulmonary effort is normal.      Breath sounds: Normal breath sounds.   Abdominal:      General: Bowel sounds are normal. There is no distension.      Palpations: Abdomen is soft.      Tenderness: There is no abdominal tenderness.     Labs: Reviewed  No results found for: CRP, CALPROTECTIN  No results found for: HEPBSAG, HEPBCAB  No results found for: TBGOLDPLUS  Lab Results   Component Value Date    [id number] 46 10/31/2022    VCTSVJRG01 607 08/20/2011     Lab Results   Component Value Date    WBC " 6.88 05/18/2022    HGB 12.1 05/18/2022    HCT 38.8 05/18/2022    MCV 96 05/18/2022     05/18/2022     Lab Results   Component Value Date    CREATININE 0.7 05/18/2022    ALBUMIN 4.0 05/18/2022    BILITOT 0.9 05/18/2022    ALKPHOS 73 05/18/2022    AST 19 05/18/2022    ALT 18 05/18/2022     Assessment/Plan:  Nano Sosa is a 67 y.o. female with ulcerative colitis (unclear distribution, rectum/possible left colon) S/P TAMIS of rectal adenoma and sporadic colon adenomas, Hashimito's thyroiditis, migraines, osteoporosis, who is naive to all IBD meds and most recent colonoscopy showed no active inflammation but few adenomas that were removed. Today we discussed that depending on where the distribution of her UC was initially determines her risk of colon cancer and surveillance. She will try to find old records from prior to see if we can get more info regarding distribution of disease. She had had dysplasia in setting of active colitis removed in the past as well as sporadic adenomas. Given  her high risk of colon cancer I recommended flex sig yearly and colonoscopy q 3-5 years.  She was also educated about UC and symptoms to monitor for and to let us know if she has any bowel habit changes.     # Ulcerative colitis (unclear distribution, rectum/possible left colon) S/P TAMIS of rectal adenoma and sporadic colon adenomas    - I had a long discussion with patient regarding epidemiology, potential etiologies, associations and triggers (avoiding NSAIDS, using antibiotics with caution,stress and smoking effects on disease state), diagnosis, management goals and treatment options   - flex sig 2/2024   - colonoscopy 2/2026  - stool calprotectin  - pt advised to avoid all NSAIDs (Advil, Ibuprofen, Motrin, Aspirin, Naprosyn, Aleve)  - smoking status: former smoker  - basic labs: CBC, CMP, HCV Ab, HIV, TSH, TTG IgA/total serum IgA, fasting lipids  - drug monitoring labs: TB quantiferon, Hep B testing (HBsAg,  HBtotalcoreAb)    # Colon polyps:  - high risk CRC   - pt had dysplasia in setting of active colitis removed in the past as well as sporadic adenomas  - flex sig yearly   - colonoscopy q 3-5 years.    # IBD specific health maintenance:  CRC risk: sx 2004, distribution not clear  Skin exam: use sunblock/hats/sunprotective clothing-  Risk for osteopenia/osteoporosis- postmenopausal  Pap smear- defer to GYN  Vitamin D- check vit D  Vaccines: will check immunity to hep A, B, varicella and MMR    Follow up: 3/2023    Thank you again for sending Nano Sosa to see Dr. Srinivas Linares today at the [facility]. Please don't hesitate to contact Dr. Linares if there are any questions regarding this evaluation, or if you have any other patients with inflammatory bowel disease for whom you would like a consultation. You can reach Dr. Linares at [phone] or by email at [email or web address]    Srinivas Linares MD  Department of Gastroenterology  Medical Director, Inflammatory Bowel Disease        Answers submitted by the patient for this visit:  New Patient Questionnaire  (Submitted on 3/1/2023)  Joint pain? : Yes

## 2023-03-02 NOTE — PATIENT INSTRUCTIONS
- labs fasting- please schedule with pt at primary care wellness  - flex sig in 2/2024- I have placed reminder to put in orders in future  - turn fecal calprotectin  - align or culturelle  - start calcium (caltrate D) 7015-2150 mg daily, weight bearing exercise  - referral to endocrinology for Hashimotos and osteoporosis- Dr. Templeton

## 2023-03-02 NOTE — PROGRESS NOTES
"IBD PATIENT INTAKE:    COVID symptoms in the last 7 days (runny nose, sore throat, congestion, cough, fever): No  PCP: Tana Washington  If not PCP-  number given to establish [phone]: No    ALLERGIES REVIEWED:  Yes    CHIEF COMPLAINT:    Chief Complaint   Patient presents with    Ulcerative Colitis       VITAL SIGNS:  /72 (BP Location: Left arm, Patient Position: Sitting)   Pulse (!) 58   Temp 97.7 °F (36.5 °C)   Ht 5' 7" (1.702 m)   Wt 78.3 kg (172 lb 11.2 oz)   PF 99 L/min   BMI 27.05 kg/m²      Change in medical, surgical, family or social history: No    IBD THERAPY (name, dose/frequency):  none  Last dose:  no    Next dose:  no  Infusion/Pharmacy: NA    NSAIDs (aspirin, ibuprofen-advil or motrin, naproxen-aleve, diclofenac-voltaren, BC powder, excedrin, goodies): Yes  mg PRN take total of 2 tabs per month     Alternative/Complementary Medications (i.e. probiotics, turmeric, fish oil, aloe vera):      no  Name/dose:  none    Vitamins:   Vit D:  4000 IU daily      Vit B-12:  no   Folic Acid: no       Calcium: no     Iron:  no      MVI: yes    Antibiotics (past 30 Days):  no  If yes   Indication:  Name of antibiotic:  Completion date:     REVIEWED MEDICATION LIST RECONCILED INCLUDING ABOVE MEDS:  yes                  Answers submitted by the patient for this visit:  New Patient Questionnaire  (Submitted on 3/1/2023)  cough: Yes  on going cough   Joint pain? : Yes    "

## 2023-03-06 ENCOUNTER — PATIENT MESSAGE (OUTPATIENT)
Dept: GASTROENTEROLOGY | Facility: CLINIC | Age: 68
End: 2023-03-06
Payer: MEDICARE

## 2023-03-07 ENCOUNTER — LAB VISIT (OUTPATIENT)
Dept: LAB | Facility: HOSPITAL | Age: 68
End: 2023-03-07
Attending: INTERNAL MEDICINE
Payer: MEDICARE

## 2023-03-07 DIAGNOSIS — D64.9 ANEMIA, UNSPECIFIED TYPE: ICD-10-CM

## 2023-03-07 DIAGNOSIS — K51.90 ULCERATIVE COLITIS WITHOUT COMPLICATIONS, UNSPECIFIED LOCATION: Primary | ICD-10-CM

## 2023-03-07 DIAGNOSIS — K51.90 ULCERATIVE COLITIS WITHOUT COMPLICATIONS, UNSPECIFIED LOCATION: ICD-10-CM

## 2023-03-07 PROCEDURE — 86480 TB TEST CELL IMMUN MEASURE: CPT | Performed by: INTERNAL MEDICINE

## 2023-03-08 LAB
GAMMA INTERFERON BACKGROUND BLD IA-ACNC: 0.01 IU/ML
M TB IFN-G CD4+ BCKGRND COR BLD-ACNC: 0.01 IU/ML
MITOGEN IGNF BCKGRD COR BLD-ACNC: 9.99 IU/ML
TB GOLD PLUS: NEGATIVE
TB2 - NIL: 0.01 IU/ML

## 2023-06-08 ENCOUNTER — OFFICE VISIT (OUTPATIENT)
Dept: OPTOMETRY | Facility: CLINIC | Age: 68
End: 2023-06-08
Payer: MEDICARE

## 2023-06-08 DIAGNOSIS — Z13.5 GLAUCOMA SCREENING: ICD-10-CM

## 2023-06-08 DIAGNOSIS — H52.4 PRESBYOPIA: ICD-10-CM

## 2023-06-08 DIAGNOSIS — H25.13 NUCLEAR SCLEROSIS, BILATERAL: Primary | ICD-10-CM

## 2023-06-08 PROCEDURE — 92014 COMPRE OPH EXAM EST PT 1/>: CPT | Mod: S$GLB,,, | Performed by: OPTOMETRIST

## 2023-06-08 PROCEDURE — 1160F RVW MEDS BY RX/DR IN RCRD: CPT | Mod: CPTII,S$GLB,, | Performed by: OPTOMETRIST

## 2023-06-08 PROCEDURE — 92015 PR REFRACTION: ICD-10-PCS | Mod: S$GLB,,, | Performed by: OPTOMETRIST

## 2023-06-08 PROCEDURE — 3288F FALL RISK ASSESSMENT DOCD: CPT | Mod: CPTII,S$GLB,, | Performed by: OPTOMETRIST

## 2023-06-08 PROCEDURE — 1159F PR MEDICATION LIST DOCUMENTED IN MEDICAL RECORD: ICD-10-PCS | Mod: CPTII,S$GLB,, | Performed by: OPTOMETRIST

## 2023-06-08 PROCEDURE — 1126F PR PAIN SEVERITY QUANTIFIED, NO PAIN PRESENT: ICD-10-PCS | Mod: CPTII,S$GLB,, | Performed by: OPTOMETRIST

## 2023-06-08 PROCEDURE — 1101F PT FALLS ASSESS-DOCD LE1/YR: CPT | Mod: CPTII,S$GLB,, | Performed by: OPTOMETRIST

## 2023-06-08 PROCEDURE — 1159F MED LIST DOCD IN RCRD: CPT | Mod: CPTII,S$GLB,, | Performed by: OPTOMETRIST

## 2023-06-08 PROCEDURE — 3288F PR FALLS RISK ASSESSMENT DOCUMENTED: ICD-10-PCS | Mod: CPTII,S$GLB,, | Performed by: OPTOMETRIST

## 2023-06-08 PROCEDURE — 99999 PR PBB SHADOW E&M-EST. PATIENT-LVL II: ICD-10-PCS | Mod: PBBFAC,,, | Performed by: OPTOMETRIST

## 2023-06-08 PROCEDURE — 92015 DETERMINE REFRACTIVE STATE: CPT | Mod: S$GLB,,, | Performed by: OPTOMETRIST

## 2023-06-08 PROCEDURE — 92014 PR EYE EXAM, EST PATIENT,COMPREHESV: ICD-10-PCS | Mod: S$GLB,,, | Performed by: OPTOMETRIST

## 2023-06-08 PROCEDURE — 1101F PR PT FALLS ASSESS DOC 0-1 FALLS W/OUT INJ PAST YR: ICD-10-PCS | Mod: CPTII,S$GLB,, | Performed by: OPTOMETRIST

## 2023-06-08 PROCEDURE — 99999 PR PBB SHADOW E&M-EST. PATIENT-LVL II: CPT | Mod: PBBFAC,,, | Performed by: OPTOMETRIST

## 2023-06-08 PROCEDURE — 1160F PR REVIEW ALL MEDS BY PRESCRIBER/CLIN PHARMACIST DOCUMENTED: ICD-10-PCS | Mod: CPTII,S$GLB,, | Performed by: OPTOMETRIST

## 2023-06-08 PROCEDURE — 1126F AMNT PAIN NOTED NONE PRSNT: CPT | Mod: CPTII,S$GLB,, | Performed by: OPTOMETRIST

## 2023-06-08 NOTE — PROGRESS NOTES
HPI    67 Y/o female is here for routine eye exam with no C/o about ocular health      Pt denies pain and discomfort   No f/f    Eye med: no gtt   Last edited by En Bennett MA on 6/8/2023  7:36 AM.            Assessment /Plan     For exam results, see Encounter Report.    Nuclear sclerosis, bilateral    Glaucoma screening    Presbyopia        1. Small cats OU--pt wishes new Rx (wears dist only for night driving, and otc readers).  Discussed CRIZAL for glare  2. Cluster Migraine hx    PLAN:    rtc 1 yr

## 2023-07-28 ENCOUNTER — PATIENT MESSAGE (OUTPATIENT)
Dept: GASTROENTEROLOGY | Facility: CLINIC | Age: 68
End: 2023-07-28
Payer: MEDICARE

## 2023-07-31 ENCOUNTER — PATIENT MESSAGE (OUTPATIENT)
Dept: INTERNAL MEDICINE | Facility: CLINIC | Age: 68
End: 2023-07-31
Payer: MEDICARE

## 2023-07-31 ENCOUNTER — OFFICE VISIT (OUTPATIENT)
Dept: ENDOCRINOLOGY | Facility: CLINIC | Age: 68
End: 2023-07-31
Payer: MEDICARE

## 2023-07-31 VITALS
WEIGHT: 167.56 LBS | HEART RATE: 53 BPM | DIASTOLIC BLOOD PRESSURE: 70 MMHG | OXYGEN SATURATION: 98 % | BODY MASS INDEX: 26.24 KG/M2 | SYSTOLIC BLOOD PRESSURE: 110 MMHG

## 2023-07-31 DIAGNOSIS — Z87.19 HISTORY OF ULCERATIVE COLITIS: ICD-10-CM

## 2023-07-31 DIAGNOSIS — R79.9 ABNORMAL FINDING OF BLOOD CHEMISTRY, UNSPECIFIED: ICD-10-CM

## 2023-07-31 DIAGNOSIS — Z86.39 HISTORY OF HASHIMOTO THYROIDITIS: Primary | ICD-10-CM

## 2023-07-31 DIAGNOSIS — E55.9 MILD VITAMIN D DEFICIENCY: ICD-10-CM

## 2023-07-31 DIAGNOSIS — E66.9 OBESITY (BMI 30-39.9): ICD-10-CM

## 2023-07-31 DIAGNOSIS — M81.0 OSTEOPOROSIS, UNSPECIFIED OSTEOPOROSIS TYPE, UNSPECIFIED PATHOLOGICAL FRACTURE PRESENCE: ICD-10-CM

## 2023-07-31 DIAGNOSIS — M81.8 OTHER OSTEOPOROSIS WITHOUT CURRENT PATHOLOGICAL FRACTURE: ICD-10-CM

## 2023-07-31 DIAGNOSIS — E04.1 THYROID NODULE: ICD-10-CM

## 2023-07-31 PROCEDURE — 99204 OFFICE O/P NEW MOD 45 MIN: CPT | Mod: GC,S$GLB,, | Performed by: INTERNAL MEDICINE

## 2023-07-31 PROCEDURE — 3008F PR BODY MASS INDEX (BMI) DOCUMENTED: ICD-10-PCS | Mod: CPTII,S$GLB,, | Performed by: INTERNAL MEDICINE

## 2023-07-31 PROCEDURE — 99999 PR PBB SHADOW E&M-EST. PATIENT-LVL III: CPT | Mod: PBBFAC,,, | Performed by: INTERNAL MEDICINE

## 2023-07-31 PROCEDURE — 1126F AMNT PAIN NOTED NONE PRSNT: CPT | Mod: CPTII,S$GLB,, | Performed by: INTERNAL MEDICINE

## 2023-07-31 PROCEDURE — 3074F SYST BP LT 130 MM HG: CPT | Mod: CPTII,S$GLB,, | Performed by: INTERNAL MEDICINE

## 2023-07-31 PROCEDURE — 1126F PR PAIN SEVERITY QUANTIFIED, NO PAIN PRESENT: ICD-10-PCS | Mod: CPTII,S$GLB,, | Performed by: INTERNAL MEDICINE

## 2023-07-31 PROCEDURE — 1101F PR PT FALLS ASSESS DOC 0-1 FALLS W/OUT INJ PAST YR: ICD-10-PCS | Mod: CPTII,S$GLB,, | Performed by: INTERNAL MEDICINE

## 2023-07-31 PROCEDURE — 99204 PR OFFICE/OUTPT VISIT, NEW, LEVL IV, 45-59 MIN: ICD-10-PCS | Mod: GC,S$GLB,, | Performed by: INTERNAL MEDICINE

## 2023-07-31 PROCEDURE — 1101F PT FALLS ASSESS-DOCD LE1/YR: CPT | Mod: CPTII,S$GLB,, | Performed by: INTERNAL MEDICINE

## 2023-07-31 PROCEDURE — 3288F FALL RISK ASSESSMENT DOCD: CPT | Mod: CPTII,S$GLB,, | Performed by: INTERNAL MEDICINE

## 2023-07-31 PROCEDURE — 3078F DIAST BP <80 MM HG: CPT | Mod: CPTII,S$GLB,, | Performed by: INTERNAL MEDICINE

## 2023-07-31 PROCEDURE — 3288F PR FALLS RISK ASSESSMENT DOCUMENTED: ICD-10-PCS | Mod: CPTII,S$GLB,, | Performed by: INTERNAL MEDICINE

## 2023-07-31 PROCEDURE — 3074F PR MOST RECENT SYSTOLIC BLOOD PRESSURE < 130 MM HG: ICD-10-PCS | Mod: CPTII,S$GLB,, | Performed by: INTERNAL MEDICINE

## 2023-07-31 PROCEDURE — 3008F BODY MASS INDEX DOCD: CPT | Mod: CPTII,S$GLB,, | Performed by: INTERNAL MEDICINE

## 2023-07-31 PROCEDURE — 3078F PR MOST RECENT DIASTOLIC BLOOD PRESSURE < 80 MM HG: ICD-10-PCS | Mod: CPTII,S$GLB,, | Performed by: INTERNAL MEDICINE

## 2023-07-31 PROCEDURE — 99999 PR PBB SHADOW E&M-EST. PATIENT-LVL III: ICD-10-PCS | Mod: PBBFAC,,, | Performed by: INTERNAL MEDICINE

## 2023-07-31 RX ORDER — CALCIUM CARBONATE 600 MG
600 TABLET ORAL ONCE
COMMUNITY

## 2023-07-31 NOTE — PROGRESS NOTES
I have seen the patient, reviewed the Fellow's history and physical, assessment, plan, and progress note. I have personally interviewed and examined the patient at bedside and agree with the findings.     CC: Hashimoto's, osteopenia.    TSH has been consistently normal, so thyroid hormone replacement isn't indicated.    She will be due for an updated DXA soon. If she meets criteria for osteoporosis either based on T-scores or FRAX, then treatment would be indicated. However, she has plans for tooth extraction followed by implants potentially, so all her dental work would need to be completed and healed before we would consider anti-resorptive therapy. Optimize calcium and Vitamin D for now. Encouraged continued weight-bearing exercise.        Garrick Templeton MD  Endocrinology Staff

## 2023-07-31 NOTE — PATIENT INSTRUCTIONS
"Osteoporosis medications  These are the medications we discussed for osteoporosis treatment:    - Bisphosphonates:         - these slow down bone loss         - oral forms (Fosamax and Actonel are examples) - taken once weekly or once monthly         - IV form (Reclast) - given intravenously once yearly    - Prolia (denosumab):          - slows down bone loss           - it is a subcutaneous injection (under the skin) every 6 months, given in the office    - Forteo (teriparatide) or Tymlos (abaloparatide):           - medications that "build bone" or increases bone formation           - subcutaneous injection (under the skin) taken once daily that you self-administer at home    For more information on medications: https://www.nof.org/patients/treatment/medicationadherence/         For calcium intake:  I advise you get 1200 mg per day of calcium, split up over the day into 2 to 4 divided doses.  This amount includes calcium from both dietary sources and/or calcium supplements, and it is best to get smaller amounts throughout the day rather than all of the calcium at one time; this allows for better absorption of the calcium.     To estimate calcium content from a nutrition label, the label lists the % calcium in that food.   For example, the label for an 8 oz glass of milk lists the calcium content as 30%.  This is equivalent to 300 mg of calcium (multiply the % by 10 to get the mg of calcium per serving).  It is best to try to get the majority of calcium intake from the diet.  I've included a table below of some calcium-rich foods.    If you are not getting enough calcium in the diet, then you can add low doses of calcium supplements.  For calcium supplements, your body can only absorb about 500-600 mg of calcium at one time.  Thus, it is best to split the tablets up over the course of a day.  It is also best to avoid taking calcium supplements at the same time as a calcium-rich food to maximize your calcium " absorption.  Calcium carbonate is best taken with or after a meal.  Calcium citrate can be taken on an empty stomach or with/after a meal.  Please look at any multivitamin you are taking as these often usually contain calcium as well.    Estimated Calcium Content of Foods:  Produce  Serving Size Estimated Calcium*    Johnna greens, frozen 8 oz 360 mg   Broccoli francisco javier 8 oz 200 mg   Kale, frozen 8 oz 180 mg   Soy Beans, green, boiled 8 oz 175 mg   Bok Leelee, cooked, boiled 8 oz 160 mg   Figs, dried 2 figs 65 mg   Broccoli, fresh, cooked 8 oz 60 mg   Oranges 1 whole 55 mg   Seafood Serving Size Estimated Calcium*    Sardines, canned with bones 3 oz 325 mg   [place], canned with bones 3 oz 180 mg   Shrimp, canned 3 oz 125 mg   Dairy Serving Size Estimated Calcium*    Ricotta, part-skim 4 oz 335 mg   Yogurt, plain, low-fat 6 oz 310 mg   Milk, skim, low-fat, whole 8 oz 300 mg   Yogurt with fruit, low-fat 6 oz 260 mg   Mozzarella, part-skim 1 oz 210 mg   Cheddar 1 oz 205 mg   Yogurt, Greek 6 oz 200 mg   American Cheese 1 oz 195 mg   Feta Cheese 4 oz 140 mg   Cottage Cheese, 2% 4 oz 105 mg   Frozen yogurt, vanilla 8 oz 105 mg   Ice Cream, vanilla 8 oz 85 mg   Parmesan 1 tbsp 55 mg   Fortified Food Serving Size Estimated Calcium*   Borden milk, rice milk or soy milk, fortified 8 oz 300 mg   Orange juice and other fruit juices, fortified 8 oz 300 mg   Tofu, prepared with calcium 4 oz 205 mg   Waffle, frozen, fortified 2 pieces 200 mg   Oatmeal, fortified 1 packet 140 mg   English muffin, fortified 1 muffin 100 mg   Cereal, fortified 8 oz 100-1,000 mg   Other Serving Size Estimated Calcium*   Mac & cheese, frozen 1 package 325 mg   Pizza, cheese, frozen 1 serving 115 mg   Pudding, chocolate, prepared with 2% milk 4 oz 160 mg   Beans, baked, canned 4 oz 160 mg   *The calcium content listed for most foods is estimated and can vary due to multiple factors. Check the food label to determine how much calcium is in a particular  product.  If you read the nutrition label for a food source, it lists the % calcium in that food.  For an 8 oz glass of milk, for example, the label states calcium 30%.  This is equivalent to 300 mg of calcium (multiply the listed number by 10).   **Table from the National Osteoporosis Foundation

## 2023-07-31 NOTE — ASSESSMENT & PLAN NOTE
Key History and Diagnostic Findings  Previous FNAs of thyroid nodules showing atypia vs. FLUS in 2016    Plan  - Will proceed with surveillance ultrasound of Thyroid.

## 2023-07-31 NOTE — ASSESSMENT & PLAN NOTE
Well controlled  Not on thyroid medication  Recommend yearly TSH surveillance  No indication for thyroid replacement
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
## 2023-07-31 NOTE — ASSESSMENT & PLAN NOTE
Recommend scheduled DXA due in October  She has a PCP visit in September and can schedule at that time.

## 2023-08-02 ENCOUNTER — PATIENT MESSAGE (OUTPATIENT)
Dept: INTERNAL MEDICINE | Facility: CLINIC | Age: 68
End: 2023-08-02
Payer: MEDICARE

## 2023-08-02 ENCOUNTER — LAB VISIT (OUTPATIENT)
Dept: LAB | Facility: HOSPITAL | Age: 68
End: 2023-08-02
Payer: MEDICARE

## 2023-08-02 DIAGNOSIS — E66.9 OBESITY (BMI 30-39.9): ICD-10-CM

## 2023-08-02 DIAGNOSIS — Z87.19 HISTORY OF ULCERATIVE COLITIS: ICD-10-CM

## 2023-08-02 DIAGNOSIS — R79.9 ABNORMAL FINDING OF BLOOD CHEMISTRY, UNSPECIFIED: ICD-10-CM

## 2023-08-02 DIAGNOSIS — Z86.39 HISTORY OF HASHIMOTO THYROIDITIS: ICD-10-CM

## 2023-08-02 LAB
ALBUMIN SERPL BCP-MCNC: 4.1 G/DL (ref 3.5–5.2)
ALP SERPL-CCNC: 55 U/L (ref 55–135)
ALT SERPL W/O P-5'-P-CCNC: 14 U/L (ref 10–44)
ANION GAP SERPL CALC-SCNC: 6 MMOL/L (ref 8–16)
AST SERPL-CCNC: 17 U/L (ref 10–40)
BASOPHILS # BLD AUTO: 0.03 K/UL (ref 0–0.2)
BASOPHILS NFR BLD: 0.5 % (ref 0–1.9)
BILIRUB SERPL-MCNC: 0.9 MG/DL (ref 0.1–1)
BUN SERPL-MCNC: 16 MG/DL (ref 8–23)
CALCIUM SERPL-MCNC: 9.9 MG/DL (ref 8.7–10.5)
CHLORIDE SERPL-SCNC: 106 MMOL/L (ref 95–110)
CHOLEST SERPL-MCNC: 178 MG/DL (ref 120–199)
CHOLEST/HDLC SERPL: 2.4 {RATIO} (ref 2–5)
CO2 SERPL-SCNC: 27 MMOL/L (ref 23–29)
CREAT SERPL-MCNC: 0.8 MG/DL (ref 0.5–1.4)
DIFFERENTIAL METHOD: ABNORMAL
EOSINOPHIL # BLD AUTO: 0.1 K/UL (ref 0–0.5)
EOSINOPHIL NFR BLD: 0.8 % (ref 0–8)
ERYTHROCYTE [DISTWIDTH] IN BLOOD BY AUTOMATED COUNT: 12.8 % (ref 11.5–14.5)
EST. GFR  (NO RACE VARIABLE): >60 ML/MIN/1.73 M^2
ESTIMATED AVG GLUCOSE: 97 MG/DL (ref 68–131)
GLUCOSE SERPL-MCNC: 87 MG/DL (ref 70–110)
HBA1C MFR BLD: 5 % (ref 4–5.6)
HCT VFR BLD AUTO: 36.2 % (ref 37–48.5)
HDLC SERPL-MCNC: 75 MG/DL (ref 40–75)
HDLC SERPL: 42.1 % (ref 20–50)
HGB BLD-MCNC: 11.6 G/DL (ref 12–16)
IMM GRANULOCYTES # BLD AUTO: 0.01 K/UL (ref 0–0.04)
IMM GRANULOCYTES NFR BLD AUTO: 0.2 % (ref 0–0.5)
LDLC SERPL CALC-MCNC: 88.2 MG/DL (ref 63–159)
LYMPHOCYTES # BLD AUTO: 1.9 K/UL (ref 1–4.8)
LYMPHOCYTES NFR BLD: 30.1 % (ref 18–48)
MCH RBC QN AUTO: 30.8 PG (ref 27–31)
MCHC RBC AUTO-ENTMCNC: 32 G/DL (ref 32–36)
MCV RBC AUTO: 96 FL (ref 82–98)
MONOCYTES # BLD AUTO: 0.3 K/UL (ref 0.3–1)
MONOCYTES NFR BLD: 5.4 % (ref 4–15)
NEUTROPHILS # BLD AUTO: 4 K/UL (ref 1.8–7.7)
NEUTROPHILS NFR BLD: 63 % (ref 38–73)
NONHDLC SERPL-MCNC: 103 MG/DL
NRBC BLD-RTO: 0 /100 WBC
PLATELET # BLD AUTO: 205 K/UL (ref 150–450)
PMV BLD AUTO: 11.3 FL (ref 9.2–12.9)
POTASSIUM SERPL-SCNC: 4.4 MMOL/L (ref 3.5–5.1)
PROT SERPL-MCNC: 7.1 G/DL (ref 6–8.4)
RBC # BLD AUTO: 3.77 M/UL (ref 4–5.4)
SODIUM SERPL-SCNC: 139 MMOL/L (ref 136–145)
TRIGL SERPL-MCNC: 74 MG/DL (ref 30–150)
TSH SERPL DL<=0.005 MIU/L-ACNC: 1.95 UIU/ML (ref 0.4–4)
WBC # BLD AUTO: 6.32 K/UL (ref 3.9–12.7)

## 2023-08-02 PROCEDURE — 80061 LIPID PANEL: CPT | Performed by: INTERNAL MEDICINE

## 2023-08-02 PROCEDURE — 80053 COMPREHEN METABOLIC PANEL: CPT | Performed by: INTERNAL MEDICINE

## 2023-08-02 PROCEDURE — 85025 COMPLETE CBC W/AUTO DIFF WBC: CPT | Performed by: INTERNAL MEDICINE

## 2023-08-02 PROCEDURE — 36415 COLL VENOUS BLD VENIPUNCTURE: CPT | Performed by: INTERNAL MEDICINE

## 2023-08-02 PROCEDURE — 83036 HEMOGLOBIN GLYCOSYLATED A1C: CPT | Performed by: INTERNAL MEDICINE

## 2023-08-02 PROCEDURE — 84443 ASSAY THYROID STIM HORMONE: CPT | Performed by: INTERNAL MEDICINE

## 2023-08-10 ENCOUNTER — PATIENT MESSAGE (OUTPATIENT)
Dept: INFECTIOUS DISEASES | Facility: CLINIC | Age: 68
End: 2023-08-10
Payer: MEDICARE

## 2023-08-11 ENCOUNTER — HOSPITAL ENCOUNTER (OUTPATIENT)
Dept: RADIOLOGY | Facility: HOSPITAL | Age: 68
Discharge: HOME | End: 2023-08-11
Attending: STUDENT IN AN ORGANIZED HEALTH CARE EDUCATION/TRAINING PROGRAM
Payer: MEDICARE

## 2023-08-11 DIAGNOSIS — E04.1 THYROID NODULE: ICD-10-CM

## 2023-08-11 PROCEDURE — 76536 US EXAM OF HEAD AND NECK: CPT | Mod: TC

## 2023-08-11 PROCEDURE — 76536 US EXAM OF HEAD AND NECK: CPT | Mod: 26,,, | Performed by: STUDENT IN AN ORGANIZED HEALTH CARE EDUCATION/TRAINING PROGRAM

## 2023-08-11 PROCEDURE — 76536 US SOFT TISSUE HEAD NECK THYROID: ICD-10-PCS | Mod: 26,,, | Performed by: STUDENT IN AN ORGANIZED HEALTH CARE EDUCATION/TRAINING PROGRAM

## 2023-08-15 ENCOUNTER — PATIENT MESSAGE (OUTPATIENT)
Dept: ENDOCRINOLOGY | Facility: CLINIC | Age: 68
End: 2023-08-15
Payer: MEDICARE

## 2023-08-16 ENCOUNTER — PATIENT MESSAGE (OUTPATIENT)
Dept: ENDOCRINOLOGY | Facility: HOSPITAL | Age: 68
End: 2023-08-16
Payer: MEDICARE

## 2023-08-16 ENCOUNTER — PATIENT MESSAGE (OUTPATIENT)
Dept: ENDOCRINOLOGY | Facility: CLINIC | Age: 68
End: 2023-08-16
Payer: MEDICARE

## 2023-08-16 DIAGNOSIS — E04.1 THYROID NODULE: Primary | ICD-10-CM

## 2023-09-01 ENCOUNTER — OFFICE VISIT (OUTPATIENT)
Dept: INTERNAL MEDICINE | Facility: CLINIC | Age: 68
End: 2023-09-01
Payer: MEDICARE

## 2023-09-01 VITALS
OXYGEN SATURATION: 98 % | WEIGHT: 167.13 LBS | HEART RATE: 56 BPM | HEIGHT: 67 IN | DIASTOLIC BLOOD PRESSURE: 69 MMHG | SYSTOLIC BLOOD PRESSURE: 105 MMHG | BODY MASS INDEX: 26.23 KG/M2

## 2023-09-01 DIAGNOSIS — Z76.89 ENCOUNTER TO ESTABLISH CARE: Primary | ICD-10-CM

## 2023-09-01 DIAGNOSIS — D51.8 OTHER VITAMIN B12 DEFICIENCY ANEMIAS: ICD-10-CM

## 2023-09-01 DIAGNOSIS — Z78.0 ASYMPTOMATIC MENOPAUSAL STATE: ICD-10-CM

## 2023-09-01 DIAGNOSIS — D64.9 ANEMIA, UNSPECIFIED TYPE: ICD-10-CM

## 2023-09-01 DIAGNOSIS — I87.2 VENOUS INSUFFICIENCY: ICD-10-CM

## 2023-09-01 DIAGNOSIS — Z12.31 ENCOUNTER FOR SCREENING MAMMOGRAM FOR BREAST CANCER: ICD-10-CM

## 2023-09-01 PROCEDURE — 3288F PR FALLS RISK ASSESSMENT DOCUMENTED: ICD-10-PCS | Mod: CPTII,S$GLB,, | Performed by: INTERNAL MEDICINE

## 2023-09-01 PROCEDURE — 3288F FALL RISK ASSESSMENT DOCD: CPT | Mod: CPTII,S$GLB,, | Performed by: INTERNAL MEDICINE

## 2023-09-01 PROCEDURE — 3008F PR BODY MASS INDEX (BMI) DOCUMENTED: ICD-10-PCS | Mod: CPTII,S$GLB,, | Performed by: INTERNAL MEDICINE

## 2023-09-01 PROCEDURE — 1126F PR PAIN SEVERITY QUANTIFIED, NO PAIN PRESENT: ICD-10-PCS | Mod: CPTII,S$GLB,, | Performed by: INTERNAL MEDICINE

## 2023-09-01 PROCEDURE — 99214 PR OFFICE/OUTPT VISIT, EST, LEVL IV, 30-39 MIN: ICD-10-PCS | Mod: S$GLB,,, | Performed by: INTERNAL MEDICINE

## 2023-09-01 PROCEDURE — 99999 PR PBB SHADOW E&M-EST. PATIENT-LVL IV: ICD-10-PCS | Mod: PBBFAC,,, | Performed by: INTERNAL MEDICINE

## 2023-09-01 PROCEDURE — 3044F HG A1C LEVEL LT 7.0%: CPT | Mod: CPTII,S$GLB,, | Performed by: INTERNAL MEDICINE

## 2023-09-01 PROCEDURE — 3074F SYST BP LT 130 MM HG: CPT | Mod: CPTII,S$GLB,, | Performed by: INTERNAL MEDICINE

## 2023-09-01 PROCEDURE — 3008F BODY MASS INDEX DOCD: CPT | Mod: CPTII,S$GLB,, | Performed by: INTERNAL MEDICINE

## 2023-09-01 PROCEDURE — 1126F AMNT PAIN NOTED NONE PRSNT: CPT | Mod: CPTII,S$GLB,, | Performed by: INTERNAL MEDICINE

## 2023-09-01 PROCEDURE — 99214 OFFICE O/P EST MOD 30 MIN: CPT | Mod: S$GLB,,, | Performed by: INTERNAL MEDICINE

## 2023-09-01 PROCEDURE — 3044F PR MOST RECENT HEMOGLOBIN A1C LEVEL <7.0%: ICD-10-PCS | Mod: CPTII,S$GLB,, | Performed by: INTERNAL MEDICINE

## 2023-09-01 PROCEDURE — 3078F PR MOST RECENT DIASTOLIC BLOOD PRESSURE < 80 MM HG: ICD-10-PCS | Mod: CPTII,S$GLB,, | Performed by: INTERNAL MEDICINE

## 2023-09-01 PROCEDURE — 1101F PT FALLS ASSESS-DOCD LE1/YR: CPT | Mod: CPTII,S$GLB,, | Performed by: INTERNAL MEDICINE

## 2023-09-01 PROCEDURE — 1101F PR PT FALLS ASSESS DOC 0-1 FALLS W/OUT INJ PAST YR: ICD-10-PCS | Mod: CPTII,S$GLB,, | Performed by: INTERNAL MEDICINE

## 2023-09-01 PROCEDURE — 3074F PR MOST RECENT SYSTOLIC BLOOD PRESSURE < 130 MM HG: ICD-10-PCS | Mod: CPTII,S$GLB,, | Performed by: INTERNAL MEDICINE

## 2023-09-01 PROCEDURE — 99999 PR PBB SHADOW E&M-EST. PATIENT-LVL IV: CPT | Mod: PBBFAC,,, | Performed by: INTERNAL MEDICINE

## 2023-09-01 PROCEDURE — 3078F DIAST BP <80 MM HG: CPT | Mod: CPTII,S$GLB,, | Performed by: INTERNAL MEDICINE

## 2023-09-01 NOTE — PROGRESS NOTES
Subjective:       Patient ID: Nano Sosa is a 68 y.o. female.    Chief Complaint: Establish Care and Annual Exam    HPI    Presents to establish care and for annual exam.     Having some lower extremity swelling and varicose veins on lower extremities.     Had mild anemia on labs.     PMHx:  Osteoporosis not currently on medications.     Health Maintenance:  Colon Cancer Screening: Colonoscopy in February 2023 with polyps removed. Due again 2028  Mammogram: Due in November. Order today   Hep C: negative 2023   Lipids: normal 2023   Vaccines:  up to date     Review of Systems   Constitutional:  Negative for activity change, appetite change and chills.   HENT:  Negative for ear pain, sinus pressure/congestion and sneezing.    Respiratory:  Negative for cough and shortness of breath.    Cardiovascular:  Negative for chest pain, palpitations and leg swelling.   Gastrointestinal:  Negative for abdominal distention, abdominal pain, constipation, diarrhea, nausea and vomiting.   Genitourinary:  Negative for dysuria and hematuria.   Musculoskeletal:  Negative for arthralgias, back pain and myalgias.   Neurological:  Negative for dizziness and headaches.   Psychiatric/Behavioral:  Negative for agitation. The patient is not nervous/anxious.            Past Medical History:   Diagnosis Date    General anesthetics causing adverse effect in therapeutic use     wakes up hyperventilating/chills    Goiter     Hashimoto's disease     Migraine     Osteopenia     Rectal mass     Ulcerative colitis     Urinary tract infection     Vitamin D deficiency      Past Surgical History:   Procedure Laterality Date    BUNIONECTOMY      right    colon sx      COLONOSCOPY N/A 12/19/2016    Procedure: COLONOSCOPY;  Surgeon: Alli Peña MD;  Location: [address]);  Service: Endoscopy;  Laterality: N/A;    COLONOSCOPY N/A 1/7/2019    Procedure: COLONOSCOPY;  Surgeon: Vel Self MD;  Location: [address]);   Service: Endoscopy;  Laterality: N/A;  Pt requests Dr. Peña, Pt ok with Dr. Self    COLONOSCOPY N/A 2/13/2023    Procedure: COLONOSCOPY;  Surgeon: Srinivas Linares MD;  Location: [facility] ([address] [place] [address]);  Service: Endoscopy;  Laterality: N/A;  instructions via portal -   pt request Sutab  chgd from [facility] to [facility]--1/17  Precall complete- [place]    f.e.s.s.  12/5/2014    FACIAL COSMETIC SURGERY      TOTAL ABDOMINAL HYSTERECTOMY  1994    [facility]      Patient Active Problem List   Diagnosis    Thyroid nodule    History of Hashimoto thyroiditis    Environmental allergies    History of colon polyps    Mild vitamin D deficiency    Rectal mass    Pulmonary nodules    Colon polyp    Other osteoporosis without current pathological fracture        Objective:      Physical Exam  Constitutional:       Appearance: Normal appearance.   HENT:      Head: Normocephalic.      Right Ear: Tympanic membrane normal.      Left Ear: Tympanic membrane normal.      Nose: Nose normal.   Cardiovascular:      Rate and Rhythm: Normal rate and regular rhythm.      Pulses: Normal pulses.      Heart sounds: Normal heart sounds.   Pulmonary:      Effort: Pulmonary effort is normal.      Breath sounds: Normal breath sounds.   Abdominal:      General: Abdomen is flat. Bowel sounds are normal.      Palpations: Abdomen is soft.   Musculoskeletal:         General: Normal range of motion.      Cervical back: Normal range of motion and neck supple.   Skin:     General: Skin is warm and dry.   Neurological:      General: No focal deficit present.      Mental Status: She is alert and oriented to person, place, and time.   Psychiatric:         Mood and Affect: Mood normal.         Assessment:       Problem List Items Addressed This Visit    None  Visit Diagnoses       Encounter to establish care    -  Primary    Anemia, unspecified type        Relevant Orders    Iron and TIBC    VITAMIN B12    FERRITIN    CBC W/ AUTO DIFFERENTIAL    RETICULOCYTES    Venous  insufficiency        Relevant Orders    CV Ultrasound doppler venous legs bilat    Ambulatory referral/consult to Vascular Surgery    Other vitamin B12 deficiency anemias        Relevant Orders    VITAMIN B12    Encounter for screening mammogram for breast cancer        Relevant Orders    Mammo Digital Screening Bilat    Asymptomatic menopausal state        Relevant Orders    DXA Bone Density Axial Skeleton 1 or more sites            Plan:         Nano was seen today for establish care and annual exam.    Diagnoses and all orders for this visit:    Encounter to establish care  Colon Cancer Screening: Colonoscopy in February 2023 with polyps removed. Due again 2028  Mammogram: Due in November. Order today   Hep C: negative 2023   Lipids: normal 2023   Vaccines:  up to date     Anemia, unspecified type  Repeat labs and iron and B12     Venous insufficiency  -     CV Ultrasound doppler venous legs bilat; Future  -     Ambulatory referral/consult to Vascular Surgery; Future    Encounter for screening mammogram for breast cancer  -     Mammo Digital Screening Bilat; Future    Asymptomatic menopausal state  -     DXA Bone Density Axial Skeleton 1 or more sites; Future                 Lauren Fountain MD   Internal Medicine   Primary Care

## 2023-09-05 ENCOUNTER — PATIENT MESSAGE (OUTPATIENT)
Dept: INTERNAL MEDICINE | Facility: CLINIC | Age: 68
End: 2023-09-05
Payer: MEDICARE

## 2023-09-06 RX ORDER — FLUTICASONE PROPIONATE 50 MCG
1 SPRAY, SUSPENSION (ML) NASAL DAILY
Qty: 16 G | Refills: 3 | Status: SHIPPED | OUTPATIENT
Start: 2023-09-06 | End: 2023-09-06

## 2023-09-06 RX ORDER — FLUTICASONE PROPIONATE 50 MCG
1 SPRAY, SUSPENSION (ML) NASAL DAILY
Qty: 16 G | Refills: 3 | Status: SHIPPED | OUTPATIENT
Start: 2023-09-06

## 2023-09-09 ENCOUNTER — LAB VISIT (OUTPATIENT)
Dept: LAB | Facility: HOSPITAL | Age: 68
End: 2023-09-09
Payer: MEDICARE

## 2023-09-09 DIAGNOSIS — D51.8 OTHER VITAMIN B12 DEFICIENCY ANEMIAS: ICD-10-CM

## 2023-09-09 DIAGNOSIS — D64.9 ANEMIA, UNSPECIFIED TYPE: ICD-10-CM

## 2023-09-09 LAB
BASOPHILS # BLD AUTO: 0.03 K/UL (ref 0–0.2)
BASOPHILS NFR BLD: 0.3 % (ref 0–1.9)
DIFFERENTIAL METHOD: ABNORMAL
EOSINOPHIL # BLD AUTO: 0 K/UL (ref 0–0.5)
EOSINOPHIL NFR BLD: 0.4 % (ref 0–8)
ERYTHROCYTE [DISTWIDTH] IN BLOOD BY AUTOMATED COUNT: 12.7 % (ref 11.5–14.5)
FERRITIN SERPL-MCNC: 216 NG/ML (ref 20–300)
HCT VFR BLD AUTO: 34.3 % (ref 37–48.5)
HGB BLD-MCNC: 11 G/DL (ref 12–16)
IMM GRANULOCYTES # BLD AUTO: 0.05 K/UL (ref 0–0.04)
IMM GRANULOCYTES NFR BLD AUTO: 0.5 % (ref 0–0.5)
IRON SERPL-MCNC: 129 UG/DL (ref 30–160)
LYMPHOCYTES # BLD AUTO: 3.3 K/UL (ref 1–4.8)
LYMPHOCYTES NFR BLD: 32.3 % (ref 18–48)
MCH RBC QN AUTO: 30.6 PG (ref 27–31)
MCHC RBC AUTO-ENTMCNC: 32.1 G/DL (ref 32–36)
MCV RBC AUTO: 95 FL (ref 82–98)
MONOCYTES # BLD AUTO: 0.6 K/UL (ref 0.3–1)
MONOCYTES NFR BLD: 6 % (ref 4–15)
NEUTROPHILS # BLD AUTO: 6.1 K/UL (ref 1.8–7.7)
NEUTROPHILS NFR BLD: 60.5 % (ref 38–73)
NRBC BLD-RTO: 0 /100 WBC
PLATELET # BLD AUTO: 266 K/UL (ref 150–450)
PMV BLD AUTO: 10.9 FL (ref 9.2–12.9)
RBC # BLD AUTO: 3.6 M/UL (ref 4–5.4)
RETICS/RBC NFR AUTO: 1.2 % (ref 0.5–2.5)
SATURATED IRON: 38 % (ref 20–50)
TOTAL IRON BINDING CAPACITY: 339 UG/DL (ref 250–450)
TRANSFERRIN SERPL-MCNC: 229 MG/DL (ref 200–375)
VIT B12 SERPL-MCNC: >2000 PG/ML (ref 210–950)
WBC # BLD AUTO: 10.05 K/UL (ref 3.9–12.7)

## 2023-09-09 PROCEDURE — 83540 ASSAY OF IRON: CPT | Performed by: INTERNAL MEDICINE

## 2023-09-09 PROCEDURE — 82728 ASSAY OF FERRITIN: CPT | Performed by: INTERNAL MEDICINE

## 2023-09-09 PROCEDURE — 82607 VITAMIN B-12: CPT | Performed by: INTERNAL MEDICINE

## 2023-09-09 PROCEDURE — 36415 COLL VENOUS BLD VENIPUNCTURE: CPT | Performed by: INTERNAL MEDICINE

## 2023-09-09 PROCEDURE — 85025 COMPLETE CBC W/AUTO DIFF WBC: CPT | Performed by: INTERNAL MEDICINE

## 2023-09-09 PROCEDURE — 85045 AUTOMATED RETICULOCYTE COUNT: CPT | Performed by: INTERNAL MEDICINE

## 2023-09-09 PROCEDURE — 84466 ASSAY OF TRANSFERRIN: CPT | Performed by: INTERNAL MEDICINE

## 2023-09-14 DIAGNOSIS — M79.89 PAIN AND SWELLING OF LOWER EXTREMITY, UNSPECIFIED LATERALITY: Primary | ICD-10-CM

## 2023-09-14 DIAGNOSIS — M79.606 PAIN AND SWELLING OF LOWER EXTREMITY, UNSPECIFIED LATERALITY: Primary | ICD-10-CM

## 2023-09-14 DIAGNOSIS — I80.03 PHLEBITIS AND THROMBOPHLEBITIS OF SUPERFICIAL VESSELS OF LOWER EXTREMITIES, BILATERAL: ICD-10-CM

## 2023-09-21 ENCOUNTER — HOSPITAL ENCOUNTER (OUTPATIENT)
Dept: RADIOLOGY | Facility: OTHER | Age: 68
Discharge: HOME | End: 2023-09-21
Attending: SURGERY
Payer: MEDICARE

## 2023-09-21 DIAGNOSIS — M79.89 PAIN AND SWELLING OF LOWER EXTREMITY, UNSPECIFIED LATERALITY: ICD-10-CM

## 2023-09-21 DIAGNOSIS — M79.606 PAIN AND SWELLING OF LOWER EXTREMITY, UNSPECIFIED LATERALITY: ICD-10-CM

## 2023-09-21 DIAGNOSIS — I80.03 PHLEBITIS AND THROMBOPHLEBITIS OF SUPERFICIAL VESSELS OF LOWER EXTREMITIES, BILATERAL: ICD-10-CM

## 2023-09-21 PROCEDURE — 93970 US LOWER EXTREMITY VEINS BILATERAL INSUFFICIENCY: ICD-10-PCS | Mod: 26,,, | Performed by: RADIOLOGY

## 2023-09-21 PROCEDURE — 93970 EXTREMITY STUDY: CPT | Mod: 26,,, | Performed by: RADIOLOGY

## 2023-09-21 PROCEDURE — 93970 EXTREMITY STUDY: CPT | Mod: TC

## 2023-09-22 ENCOUNTER — PATIENT MESSAGE (OUTPATIENT)
Dept: INTERNAL MEDICINE | Facility: CLINIC | Age: 68
End: 2023-09-22
Payer: MEDICARE

## 2023-09-22 DIAGNOSIS — D64.9 ANEMIA, UNSPECIFIED TYPE: Primary | ICD-10-CM

## 2023-10-04 ENCOUNTER — PATIENT MESSAGE (OUTPATIENT)
Dept: INTERNAL MEDICINE | Facility: CLINIC | Age: 68
End: 2023-10-04
Payer: MEDICARE

## 2023-11-01 ENCOUNTER — OFFICE VISIT (OUTPATIENT)
Dept: VASCULAR SURGERY | Facility: CLINIC | Age: 68
End: 2023-11-01
Payer: MEDICARE

## 2023-11-01 VITALS
BODY MASS INDEX: 28.22 KG/M2 | SYSTOLIC BLOOD PRESSURE: 114 MMHG | HEART RATE: 63 BPM | OXYGEN SATURATION: 95 % | WEIGHT: 179.81 LBS | HEIGHT: 67 IN | DIASTOLIC BLOOD PRESSURE: 58 MMHG

## 2023-11-01 DIAGNOSIS — I78.1 ASYMPTOMATIC SPIDER VEINS OF BOTH LOWER EXTREMITIES: ICD-10-CM

## 2023-11-01 DIAGNOSIS — I83.893 VARICOSE VEINS OF LEG WITH SWELLING, BILATERAL: ICD-10-CM

## 2023-11-01 DIAGNOSIS — I83.813 VARICOSE VEINS OF BILATERAL LOWER EXTREMITIES WITH PAIN: Primary | ICD-10-CM

## 2023-11-01 DIAGNOSIS — I87.2 VENOUS INSUFFICIENCY: ICD-10-CM

## 2023-11-01 PROCEDURE — 99203 OFFICE O/P NEW LOW 30 MIN: CPT | Mod: S$GLB,,, | Performed by: SURGERY

## 2023-11-01 PROCEDURE — 3074F SYST BP LT 130 MM HG: CPT | Mod: CPTII,S$GLB,, | Performed by: SURGERY

## 2023-11-01 PROCEDURE — 3288F FALL RISK ASSESSMENT DOCD: CPT | Mod: CPTII,S$GLB,, | Performed by: SURGERY

## 2023-11-01 PROCEDURE — 1126F PR PAIN SEVERITY QUANTIFIED, NO PAIN PRESENT: ICD-10-PCS | Mod: CPTII,S$GLB,, | Performed by: SURGERY

## 2023-11-01 PROCEDURE — 3044F PR MOST RECENT HEMOGLOBIN A1C LEVEL <7.0%: ICD-10-PCS | Mod: CPTII,S$GLB,, | Performed by: SURGERY

## 2023-11-01 PROCEDURE — 99203 PR OFFICE/OUTPT VISIT, NEW, LEVL III, 30-44 MIN: ICD-10-PCS | Mod: S$GLB,,, | Performed by: SURGERY

## 2023-11-01 PROCEDURE — 1159F PR MEDICATION LIST DOCUMENTED IN MEDICAL RECORD: ICD-10-PCS | Mod: CPTII,S$GLB,, | Performed by: SURGERY

## 2023-11-01 PROCEDURE — 3044F HG A1C LEVEL LT 7.0%: CPT | Mod: CPTII,S$GLB,, | Performed by: SURGERY

## 2023-11-01 PROCEDURE — 3078F PR MOST RECENT DIASTOLIC BLOOD PRESSURE < 80 MM HG: ICD-10-PCS | Mod: CPTII,S$GLB,, | Performed by: SURGERY

## 2023-11-01 PROCEDURE — 3008F PR BODY MASS INDEX (BMI) DOCUMENTED: ICD-10-PCS | Mod: CPTII,S$GLB,, | Performed by: SURGERY

## 2023-11-01 PROCEDURE — 3074F PR MOST RECENT SYSTOLIC BLOOD PRESSURE < 130 MM HG: ICD-10-PCS | Mod: CPTII,S$GLB,, | Performed by: SURGERY

## 2023-11-01 PROCEDURE — 1101F PT FALLS ASSESS-DOCD LE1/YR: CPT | Mod: CPTII,S$GLB,, | Performed by: SURGERY

## 2023-11-01 PROCEDURE — 3008F BODY MASS INDEX DOCD: CPT | Mod: CPTII,S$GLB,, | Performed by: SURGERY

## 2023-11-01 PROCEDURE — 3078F DIAST BP <80 MM HG: CPT | Mod: CPTII,S$GLB,, | Performed by: SURGERY

## 2023-11-01 PROCEDURE — 1126F AMNT PAIN NOTED NONE PRSNT: CPT | Mod: CPTII,S$GLB,, | Performed by: SURGERY

## 2023-11-01 PROCEDURE — 3288F PR FALLS RISK ASSESSMENT DOCUMENTED: ICD-10-PCS | Mod: CPTII,S$GLB,, | Performed by: SURGERY

## 2023-11-01 PROCEDURE — 1159F MED LIST DOCD IN RCRD: CPT | Mod: CPTII,S$GLB,, | Performed by: SURGERY

## 2023-11-01 PROCEDURE — 1101F PR PT FALLS ASSESS DOC 0-1 FALLS W/OUT INJ PAST YR: ICD-10-PCS | Mod: CPTII,S$GLB,, | Performed by: SURGERY

## 2023-11-01 NOTE — PROGRESS NOTES
Vel Campos MD, RPVI                                 [facility]                           [facility]                             11/01/2023    HPI:  Nano Sosa is a 68 y.o. female with   Patient Active Problem List   Diagnosis    Thyroid nodule    History of Hashimoto thyroiditis    Environmental allergies    History of colon polyps    Mild vitamin D deficiency    Rectal mass    Pulmonary nodules    Colon polyp    Other osteoporosis without current pathological fracture    being managed by PCP and specialists who is here today for evaluation of BLE edema and varicose veins.  Patient states location is BLE occurring for months.  Associated signs and symptoms include varicose veins.  Quality is heavy and severity is 5/10.  Symptoms began months ago.  Alleviating factors include elevation.  Worsening factors include dependency.  Patient has been wearing compression stockings for greater than 3 months.  +FH of venous disease.  Symptoms do limit patient's functional status and daily activities.  no DVT history.  no venous interventions.  + low sodium diet.  no excessive water intake.    Migraine with aura: no  PFO/ASD/right to left shunt: no  Pregnant: no  Breastfeeding: no    no MI  no Stroke  Tobacco use: denies    Past Medical History:   Diagnosis Date    General anesthetics causing adverse effect in therapeutic use     wakes up hyperventilating/chills    Goiter     Hashimoto's disease     Migraine     Osteopenia     Rectal mass     Ulcerative colitis     Urinary tract infection     Vitamin D deficiency      Past Surgical History:   Procedure Laterality Date    BUNIONECTOMY      right    colon sx      COLONOSCOPY N/A 12/19/2016    Procedure: COLONOSCOPY;  Surgeon: Alli Peña MD;  Location: [facility] ([address] [place] [address]);  Service: Endoscopy;  Laterality: N/A;    COLONOSCOPY N/A 1/7/2019    Procedure: COLONOSCOPY;  Surgeon: Vel Self MD;  Location: [facility] ([facility]);  Service: Endoscopy;  Laterality: N/A;  Pt requests Dr. Peña, Pt ok with Dr. Self    COLONOSCOPY N/A 2023    Procedure: COLONOSCOPY;  Surgeon: Srinivas Linares MD;  Location: [facility] ([facility]);  Service: Endoscopy;  Laterality: N/A;  instructions via portal -   pt request Sutab  [facility]  [place]- [place]    f.e.s.s.  2014    FACIAL COSMETIC SURGERY      TOTAL ABDOMINAL HYSTERECTOMY      ISIDRO     Family History   Problem Relation Age of Onset    Rheum arthritis Mother     Migraines Mother     Breast cancer Maternal Aunt     Heart disease Maternal Uncle     Hypertension Maternal Uncle     Colon cancer Maternal Aunt     Anesthesia problems Neg Hx     Diabetes Neg Hx     Ovarian cancer Neg Hx      Social History     Socioeconomic History    Marital status: Single   Tobacco Use    Smoking status: Former     Current packs/day: 0.00     Average packs/day: 1 pack/day for 30.0 years (30.0 ttl pk-yrs)     Types: Cigarettes     Start date: 1969     Quit date: 1999     Years since quittin.2     Passive exposure: Never    Smokeless tobacco: Never   Substance and Sexual Activity    Alcohol use: Yes     Alcohol/week: 6.0 standard drinks of alcohol     Types: 4 Glasses of wine, 2 Standard drinks or equivalent per week     Comment: weekends    Drug use: No    Sexual activity: Not Currently     Partners: Male     Birth control/protection: See Surgical Hx   Social History Narrative    Walks, does yoga and rides bike for exercise.      Social Determinants of Health     Financial Resource Strain: Low Risk  (2023)    Overall Financial Resource Strain (CARDIA)     Difficulty of Paying Living Expenses: Not hard at all   Food Insecurity: No Food Insecurity (2023)    Hunger Vital Sign     Worried About Running Out of Food in the Last Year: Never true     Ran Out of Food in the Last Year: Never true   Transportation Needs: No Transportation Needs  (1/12/2023)    PRAPARE - Transportation     Lack of Transportation (Medical): No     Lack of Transportation (Non-Medical): No   Physical Activity: Sufficiently Active (1/12/2023)    Exercise Vital Sign     Days of Exercise per Week: 4 days     Minutes of Exercise per Session: 60 min   Stress: No Stress Concern Present (1/12/2023)    [employer] [employer] - Occupational Stress Questionnaire     Feeling of Stress : Not at all   Social Connections: Unknown (1/12/2023)    Social Connection and Isolation Panel [NHANES]     Frequency of Communication with Friends and Family: Three times a week     Frequency of Social Gatherings with Friends and Family: Three times a week     Attends Tenriism Services: Never     Active Member of Clubs or Organizations: No     Attends Club or Organization Meetings: Never   Housing Stability: Unknown (1/12/2023)    Housing Stability Vital Sign     Unable to Pay for Housing in the Last Year: No     Number of Places Lived in the Last Year: 1       Current Outpatient Medications:     b complex vitamins tablet, Take 1 tablet by mouth once daily., Disp: , Rfl:     calcium carbonate (OS-CONRAD) 600 mg calcium (1,500 mg) Tab, Take 600 mg by mouth once., Disp: , Rfl:     fluticasone propionate (FLONASE) 50 mcg/actuation nasal spray, 1 spray (50 mcg total) by Each Nostril route once daily., Disp: 16 g, Rfl: 3    ibuprofen (ADVIL,MOTRIN) 200 MG tablet, Take 400 mg by mouth every 8 (eight) hours as needed for Pain., Disp: , Rfl:     multivitamin capsule, Take 1 capsule by mouth once daily., Disp: , Rfl:     vitamin D (VITAMIN D3) 1000 units Tab, Take 4,000 Units by mouth once daily., Disp: , Rfl:     REVIEW OF SYSTEMS:  General: No fevers or chills; ENT: No sore throat; Allergy and Immunology: no persistent infections; Hematological and Lymphatic: No history of bleeding or easy bruising; Endocrine: negative; Respiratory: no cough, shortness of breath, or wheezing; Cardiovascular: no  "chest pain or dyspnea on exertion; Gastrointestinal: no abdominal pain/back, change in bowel habits, or bloody stools; Genito-Urinary: no dysuria, trouble voiding, or hematuria; Musculoskeletal: edema; Neurological: no TIA or stroke symptoms; Psychiatric: no nervousness, anxiety or depression.    PHYSICAL EXAM:      Pulse: 63         General appearance:  Alert, well-appearing, and in no distress.  Oriented to person, place, and time                    Neurological: Normal speech, no focal findings noted; CN II - XII grossly intact. RLE with sensation to light touch, LLE with sensation to light touch.            Musculoskeletal: Digits/nail without cyanosis/clubbing.  Strength 5/5 BLE.                    Neck: Supple, no significant adenopathy                  Chest:  No wheezes, symmetric air entry. No use of accessory muscles               Cardiac: Normal rate and regular rhythm            Abdomen: Soft, nontender, nondistended      Extremities:   2+ R DP pulse, 2+ L DP pulse      1+ RLE edema, 1+ LLE edema    Skin:  RLE no ulcer; LLE no ulcer      RLE + spider veins, LLE + spider veins      RLE + varicose veins, LLE + varicose veins    CEAP 3/3    VCSS 6    LAB RESULTS:  No results found for: "CBC"  No results found for: "LABPROT", "INR"  Lab Results   Component Value Date     08/02/2023    K 4.4 08/02/2023     08/02/2023    CO2 27 08/02/2023    GLU 87 08/02/2023    BUN 16 08/02/2023    CREATININE 0.8 08/02/2023    CALCIUM 9.9 08/02/2023    ANIONGAP 6 (L) 08/02/2023    EGFRNONAA >60.0 05/18/2022     Lab Results   Component Value Date    WBC 10.05 09/09/2023    RBC 3.60 (L) 09/09/2023    HGB 11.0 (L) 09/09/2023    HCT 34.3 (L) 09/09/2023    MCV 95 09/09/2023    MCH 30.6 09/09/2023    MCHC 32.1 09/09/2023    RDW 12.7 09/09/2023     09/09/2023    MPV 10.9 09/09/2023    GRAN 6.1 09/09/2023    GRAN 60.5 09/09/2023    LYMPH 3.3 09/09/2023    LYMPH 32.3 09/09/2023    MONO 0.6 09/09/2023    MONO 6.0 " 09/09/2023    EOS 0.0 09/09/2023    BASO 0.03 09/09/2023    EOSINOPHIL 0.4 09/09/2023    BASOPHIL 0.3 09/09/2023    DIFFMETHOD Automated 09/09/2023     .  Lab Results   Component Value Date    HGBA1C 5.0 08/02/2023       IMAGING:  All pertinent imaging has been reviewed and interpreted independently.    Venous US 9/2023 Impression:  FINDINGS:  Deep venous system:     There is evidence of flow, compressibility and augmentation within bilateral common femoral, femoral, and popliteal veins.  Flow is seen within bilateral peroneal, posterior tibial and anterior tibial veins.     Superficial venous system:     Right leg:     Reflux times greater saphenous vein up to 4433 milliseconds.     The maximal diameter within the right greater saphenous vein is 6 mm.     The maximal diameter within the right lesser saphenous vein is 4mm.     Left leg:     Reflux times greater saphenous vein up to 3252 milliseconds.     The maximal diameter within the left greater saphenous vein is 7 mm.     The maximal diameter within the left lesser saphenous vein is 3mm.        Impression:     1.  No evidence of deep venous thrombosis in either lower extremity.     2.  Hemodynamically significant venous reflux right and left greater saphenous veins.       IMP/PLAN:  68 y.o. female with   Patient Active Problem List   Diagnosis    Thyroid nodule    History of Hashimoto thyroiditis    Environmental allergies    History of colon polyps    Mild vitamin D deficiency    Rectal mass    Pulmonary nodules    Colon polyp    Other osteoporosis without current pathological fracture    being managed by PCP and specialists who is here today for evaluation of BLE edema, symptomatic varicose veins and asymptomatic spider veins.    -Rec BLE stab phlebectomy for varicose veins  -Future spider vein BLE sclerotherapy  -recommend compression with Rx stockings, elevation, dietary changes associated with water and sodium intake discussed at length with  patient  -Exercise     I spent 12 minutes evaluating this patient and greater than 50% of the time was spent counseling, coordinator care and discussing the plan of care.  All questions were answered and patient stated understanding with agreement with the above treatment plan.    Vel Campos MD [facility]  Vascular and Endovascular Surgery

## 2023-11-10 ENCOUNTER — TELEPHONE (OUTPATIENT)
Dept: ENDOSCOPY | Facility: HOSPITAL | Age: 68
End: 2023-11-10
Payer: MEDICARE

## 2023-11-10 DIAGNOSIS — Z86.010 HISTORY OF COLON POLYPS: ICD-10-CM

## 2023-11-10 DIAGNOSIS — K51.919 ULCERATIVE COLITIS WITH COMPLICATION, UNSPECIFIED LOCATION: Primary | ICD-10-CM

## 2023-11-10 NOTE — TELEPHONE ENCOUNTER
"----- Message from Melanie Anderson sent at 2023  3:25 PM CST -----    ----- Message -----  From: Kirti Bhatti RN  Sent: 2023   9:19 AM CST  To: [facility] Staff; #    Procedure: Flex sig    Diagnosis: Ulcerative colitis w/ history of polyps    Procedure Timin-12 weeks; 2024    #If within 4 weeks selected, please vicente as high priority#    #If greater than 12 weeks, please select "4-12 weeks" and delay sending until 2 months prior to requested date#     Provider: Dr. FREDI Linares    Location: [address]    Additional Scheduling Information: No scheduling concerns    Prep Specifications:Standard prep    Have you attached a patient to this message: Yes        "

## 2023-11-10 NOTE — TELEPHONE ENCOUNTER
Spoke to patient to schedule procedure(s) Flexible Sigmoidoscopy (Flex Sig)       Physician to perform procedure(s) Dr. DOMINICK Linares  Date of Procedure (s) 2/20/24  Arrival Time 1:40 PM  Time of Procedure(s) 2:40 PM   Location of Procedure(s) [address]  Type of Rx Prep sent to patient: Enem  Instructions provided to patient via [facility]    Patient was informed on the following information and verbalized understanding. Screening questionnaire reviewed with patient and complete. If procedure requires anesthesia, a responsible adult needs to be present to accompany the patient home, patient cannot drive after receiving anesthesia. Appointment details are tentative, especially check-in time. Patient will receive a prep-op call 4 days prior to confirm check-in time for procedure. If applicable the patient should contact their pharmacy to verify Rx for procedure prep is ready for pick-up. Patient was advised to call the scheduling department at [phone] if pharmacy states no Rx is available. Patient was advised to call the endoscopy scheduling department if any questions or concerns arise.      [facility] Scheduling Department

## 2023-11-16 ENCOUNTER — HOSPITAL ENCOUNTER (OUTPATIENT)
Dept: ENDOCRINOLOGY | Facility: CLINIC | Age: 68
Discharge: HOME | End: 2023-11-16
Attending: STUDENT IN AN ORGANIZED HEALTH CARE EDUCATION/TRAINING PROGRAM
Payer: MEDICARE

## 2023-11-16 DIAGNOSIS — E04.1 THYROID NODULE: ICD-10-CM

## 2023-11-16 PROCEDURE — 10005 US FINE NEEDLE ASPIRATION THYROID, FIRST LESION: ICD-10-PCS | Mod: S$GLB,,, | Performed by: INTERNAL MEDICINE

## 2023-11-16 PROCEDURE — 88173 CYTOPATH EVAL FNA REPORT: CPT | Mod: 26,,, | Performed by: PATHOLOGY

## 2023-11-16 PROCEDURE — 10005 FNA BX W/US GDN 1ST LES: CPT | Mod: S$GLB,,, | Performed by: INTERNAL MEDICINE

## 2023-11-16 PROCEDURE — 88173 PR  INTERPRETATION OF FNA SMEAR: ICD-10-PCS | Mod: 26,,, | Performed by: PATHOLOGY

## 2023-11-16 PROCEDURE — 88173 CYTOPATH EVAL FNA REPORT: CPT | Performed by: PATHOLOGY

## 2023-11-20 LAB
FINAL PATHOLOGIC DIAGNOSIS: ABNORMAL
Lab: ABNORMAL

## 2023-11-21 ENCOUNTER — TELEPHONE (OUTPATIENT)
Dept: ENDOCRINOLOGY | Facility: CLINIC | Age: 68
End: 2023-11-21
Payer: MEDICARE

## 2023-12-04 PROCEDURE — 99284 EMERGENCY DEPT VISIT MOD MDM: CPT | Mod: 25

## 2023-12-04 PROCEDURE — 29505 APPLICATION LONG LEG SPLINT: CPT | Mod: LT

## 2023-12-05 ENCOUNTER — HOSPITAL ENCOUNTER (EMERGENCY)
Facility: HOSPITAL | Age: 68
Discharge: HOME | End: 2023-12-05
Attending: EMERGENCY MEDICINE
Payer: MEDICARE

## 2023-12-05 VITALS
HEART RATE: 54 BPM | TEMPERATURE: 98 F | OXYGEN SATURATION: 97 % | BODY MASS INDEX: 25.84 KG/M2 | WEIGHT: 165 LBS | SYSTOLIC BLOOD PRESSURE: 144 MMHG | DIASTOLIC BLOOD PRESSURE: 75 MMHG | RESPIRATION RATE: 18 BRPM
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
## 2023-12-05 DIAGNOSIS — S02.2XXA CLOSED FRACTURE OF NASAL BONE, INITIAL ENCOUNTER: Primary | ICD-10-CM

## 2023-12-05 DIAGNOSIS — S03.2XXA TOOTH AVULSION, INITIAL ENCOUNTER: ICD-10-CM

## 2023-12-05 DIAGNOSIS — S82.092A OTHER CLOSED FRACTURE OF LEFT PATELLA, INITIAL ENCOUNTER: ICD-10-CM

## 2023-12-05 PROCEDURE — 25000003 PHARM REV CODE 250: Performed by: EMERGENCY MEDICINE

## 2023-12-05 RX ORDER — IBUPROFEN 400 MG/1
400 TABLET ORAL EVERY 6 HOURS PRN
Qty: 20 TABLET | Refills: 0 | Status: SHIPPED | OUTPATIENT
Start: 2023-12-05 | End: 2024-03-01

## 2023-12-05 RX ORDER — ACETAMINOPHEN 325 MG/1
650 TABLET ORAL EVERY 6 HOURS PRN
Qty: 30 TABLET | Refills: 0 | Status: SHIPPED | OUTPATIENT
Start: 2023-12-05

## 2023-12-05 RX ORDER — ACETAMINOPHEN 500 MG
1000 TABLET ORAL
Status: COMPLETED | OUTPATIENT
Start: 2023-12-05 | End: 2023-12-05

## 2023-12-05 RX ADMIN — ACETAMINOPHEN 1000 MG: 500 TABLET ORAL at 01:12

## 2023-12-05 NOTE — ED PROVIDER NOTES
Encounter Date: 12/4/2023       History     Chief Complaint   Patient presents with    Fall     Ground level trip and fall at the airport around 2pm. Denies LOC and blood thinner use. C/o pain and swelling to L knee. Pain to nose and reports knocking 2 front teeth out. No bleeding at this time. Reports she already made apt with dentist     67 yo F w/ hx of Hashimito's thyroiditis and osteoporosis who presents to the ED after a ground-level fall. Pt reports she fell this afternoon at the airport prior to flying to [place]. She believes she tripped which caused her to fall onto her face. She knocked her front 2 teeth out. Her L knee has gradually become more swollen over the course of the day. Reports she had good ROM in her left leg earlier and was able to walk/stand on knee; now feels more discomfort and swelling that limits mobility. She was initially bleeding from her nose and mouth but that has since resolved. She took 1 aleve earlier for pain. Denies fever, chills, nausea, vomiting, chest pain, SOB, LOC, dizziness, and LH. Pt is not on any blood thinners.    In ED, /74, HR 58, satting 98% on RA, and afebrile.             Review of patient's allergies indicates:   Allergen Reactions    Penicillins Shortness Of Breath     Throat swelling    Poison ivy extract Dermatitis, Hives, Itching, Rash and Swelling    Poison sumac extract Blisters, Dermatitis, Hives, Itching, Rash and Swelling    Sulfa (sulfonamide antibiotics) Hives and Rash    Codeine Itching     Hallucinations, sweats    Erythromycin Itching and Swelling    Tessalon [benzonatate] Swelling    Unable to assess      TUNA--Flushing of skin     Past Medical History:   Diagnosis Date    General anesthetics causing adverse effect in therapeutic use     wakes up hyperventilating/chills    Goiter     Hashimoto's disease     Migraine     Osteopenia     Rectal mass     Ulcerative colitis     Urinary tract infection     Vitamin D deficiency      Past  Surgical History:   Procedure Laterality Date    BUNIONECTOMY      right    colon sx      COLONOSCOPY N/A 2016    Procedure: COLONOSCOPY;  Surgeon: Alli Peña MD;  Location: [facility] ([facility]);  Service: Endoscopy;  Laterality: N/A;    COLONOSCOPY N/A 2019    Procedure: COLONOSCOPY;  Surgeon: Vel Self MD;  Location: [facility] ENDO ([facility]);  Service: Endoscopy;  Laterality: N/A;  Pt requests Dr. Peña, Pt ok with Dr. Self    COLONOSCOPY N/A 2023    Procedure: COLONOSCOPY;  Surgeon: Srinivas Linares MD;  Location: [facility] ENDO ([facility]);  Service: Endoscopy;  Laterality: N/A;  instructions via portal -   pt request Sutab  jefgd from [facility] to [facility]--  Precall complete- [place]    f.e.s.s.  2014    FACIAL COSMETIC SURGERY      TOTAL ABDOMINAL HYSTERECTOMY      ISIDRO     Family History   Problem Relation Age of Onset    Rheum arthritis Mother     Migraines Mother     Breast cancer Maternal Aunt     Heart disease Maternal Uncle     Hypertension Maternal Uncle     Colon cancer Maternal Aunt     Anesthesia problems Neg Hx     Diabetes Neg Hx     Ovarian cancer Neg Hx      Social History     Tobacco Use    Smoking status: Former     Current packs/day: 0.00     Average packs/day: 1 pack/day for 30.0 years (30.0 ttl pk-yrs)     Types: Cigarettes     Start date: 1969     Quit date: 1999     Years since quittin.3     Passive exposure: Never    Smokeless tobacco: Never   Substance Use Topics    Alcohol use: Yes     Alcohol/week: 6.0 standard drinks of alcohol     Types: 4 Glasses of wine, 2 Standard drinks or equivalent per week     Comment: weekends    Drug use: No     Review of Systems   Constitutional:  Negative for chills and fever.   HENT:  Positive for dental problem and nosebleeds. Negative for congestion.    Respiratory:  Negative for cough and shortness of breath.    Cardiovascular:  Positive for leg swelling. Negative for chest pain.   Gastrointestinal:   Negative for abdominal pain, constipation, diarrhea, nausea and vomiting.   Genitourinary:  Negative for dysuria and frequency.   Musculoskeletal:  Negative for back pain and neck pain.        L knee swelling   Skin:  Positive for wound.   Neurological:  Negative for dizziness, weakness and light-headedness.       Physical Exam     Initial Vitals [12/04/23 2333]   BP Pulse Resp Temp SpO2   (!) 152/74 (!) 58 15 97.8 °F (36.6 °C) 98 %      MAP       --         Physical Exam    Constitutional: No distress.   HENT:   Head: Normocephalic and atraumatic.   Nose: Sinus tenderness and nasal deformity present.   Dried blood in nares  Absence of 2 upper front teeth   Eyes: EOM are normal. Pupils are equal, round, and reactive to light.   Cardiovascular:  Normal rate, regular rhythm and normal pulses.           Pulmonary/Chest: Effort normal and breath sounds normal. No respiratory distress.   Abdominal: Abdomen is soft and flat. She exhibits no distension. There is no abdominal tenderness.   Musculoskeletal:      Cervical back: Normal.      Thoracic back: Normal.      Lumbar back: Normal.      Right knee: Normal.      Left knee: Swelling present. Decreased range of motion. Tenderness present.      Comments: Able to bear weight      Neurological: She is alert and oriented to person, place, and time. She has normal strength. No sensory deficit.   Skin: Skin is warm and dry. Abrasion and bruising noted.         ED Course   Procedures  Labs Reviewed - No data to display       Imaging Results    None          Medications   acetaminophen tablet 1,000 mg (has no administration in time range)     Medical Decision Making  69 yo F presenting after fall today. Fell onto face and lost 2 front teeth. Nose bruised and swollen. Had epistaxis + bleeding from mouth earlier which has since resolved. L knee swelling that has gradually worsened throughout the day. Had good ROM earlier but now limited. Concern for knee fracture. CT head, CT  maxillofacial, and XR L knee ordered. Given acetaminophen.     Amount and/or Complexity of Data Reviewed  Radiology: ordered.    Risk  OTC drugs.  Prescription drug management.                                      Clinical Impression:  Final diagnoses:  [W19.XXXA] Elo Sosa MD  Resident  12/05/23 1253

## 2023-12-05 NOTE — ED TRIAGE NOTES
Nano Sosa, a 68 y.o. female presents to the ED w/ complaint of fall.  Pt had a fall at 2pm yesterday which resulted in her injuring her nose and knocking out a tooth.  Pt says she started having increased pain in her left knee.  Pt says she is not able to put much weight on her left leg due to pain.      Triage note:  Chief Complaint   Patient presents with    Fall     Ground level trip and fall at the airport around 2pm. Denies LOC and blood thinner use. C/o pain and swelling to L knee. Pain to nose and reports knocking 2 front teeth out. No bleeding at this time. Reports she already made apt with dentist     Review of patient's allergies indicates:   Allergen Reactions    Penicillins Shortness Of Breath     Throat swelling    Poison ivy extract Dermatitis, Hives, Itching, Rash and Swelling    Poison sumac extract Blisters, Dermatitis, Hives, Itching, Rash and Swelling    Sulfa (sulfonamide antibiotics) Hives and Rash    Codeine Itching     Hallucinations, sweats    Erythromycin Itching and Swelling    Tessalon [benzonatate] Swelling    Unable to assess      TUNA--Flushing of skin     Past Medical History:   Diagnosis Date    General anesthetics causing adverse effect in therapeutic use     wakes up hyperventilating/chills    Goiter     Hashimoto's disease     Migraine     Osteopenia     Rectal mass     Ulcerative colitis     Urinary tract infection     Vitamin D deficiency

## 2023-12-05 NOTE — DISCHARGE INSTRUCTIONS
Follow-up with ENT for your nasal bone fracture.  It is minimally displaced and will likely not be surgical.      Follow-up with the orthopedic doctors within 1 week for your patellar fracture.    You can be weight-bearing as tolerated on your left knee with crutches.    You can take the knee immobilizer off to shower but otherwise attempted to keep it on.     Follow-up tomorrow with the dentist for your tooth avulsions.      You can take Tylenol and or ibuprofen at home for pain control.

## 2023-12-06 ENCOUNTER — OFFICE VISIT (OUTPATIENT)
Dept: OTOLARYNGOLOGY | Facility: CLINIC | Age: 68
End: 2023-12-06
Payer: MEDICARE

## 2023-12-06 ENCOUNTER — PATIENT MESSAGE (OUTPATIENT)
Dept: GASTROENTEROLOGY | Facility: CLINIC | Age: 68
End: 2023-12-06
Payer: MEDICARE

## 2023-12-06 ENCOUNTER — OFFICE VISIT (OUTPATIENT)
Dept: ORTHOPEDICS | Facility: CLINIC | Age: 68
End: 2023-12-06
Payer: MEDICARE

## 2023-12-06 VITALS
HEART RATE: 57 BPM | WEIGHT: 192.88 LBS | DIASTOLIC BLOOD PRESSURE: 75 MMHG | RESPIRATION RATE: 18 BRPM | BODY MASS INDEX: 30.21 KG/M2 | SYSTOLIC BLOOD PRESSURE: 109 MMHG

## 2023-12-06 VITALS — HEART RATE: 63 BPM | DIASTOLIC BLOOD PRESSURE: 59 MMHG | SYSTOLIC BLOOD PRESSURE: 128 MMHG

## 2023-12-06 DIAGNOSIS — S02.2XXA CLOSED FRACTURE OF NASAL BONE, INITIAL ENCOUNTER: ICD-10-CM

## 2023-12-06 DIAGNOSIS — S82.092A OTHER CLOSED FRACTURE OF LEFT PATELLA, INITIAL ENCOUNTER: ICD-10-CM

## 2023-12-06 PROCEDURE — 3044F HG A1C LEVEL LT 7.0%: CPT | Mod: CPTII,S$GLB,, | Performed by: OTOLARYNGOLOGY

## 2023-12-06 PROCEDURE — 3074F SYST BP LT 130 MM HG: CPT | Mod: CPTII,S$GLB,, | Performed by: PHYSICIAN ASSISTANT

## 2023-12-06 PROCEDURE — 1160F PR REVIEW ALL MEDS BY PRESCRIBER/CLIN PHARMACIST DOCUMENTED: ICD-10-PCS | Mod: CPTII,S$GLB,, | Performed by: OTOLARYNGOLOGY

## 2023-12-06 PROCEDURE — 99203 PR OFFICE/OUTPT VISIT, NEW, LEVL III, 30-44 MIN: ICD-10-PCS | Mod: S$GLB,,, | Performed by: PHYSICIAN ASSISTANT

## 2023-12-06 PROCEDURE — 3288F FALL RISK ASSESSMENT DOCD: CPT | Mod: CPTII,S$GLB,, | Performed by: OTOLARYNGOLOGY

## 2023-12-06 PROCEDURE — 3044F HG A1C LEVEL LT 7.0%: CPT | Mod: CPTII,S$GLB,, | Performed by: PHYSICIAN ASSISTANT

## 2023-12-06 PROCEDURE — 3078F DIAST BP <80 MM HG: CPT | Mod: CPTII,S$GLB,, | Performed by: PHYSICIAN ASSISTANT

## 2023-12-06 PROCEDURE — 3074F SYST BP LT 130 MM HG: CPT | Mod: CPTII,S$GLB,, | Performed by: OTOLARYNGOLOGY

## 2023-12-06 PROCEDURE — 3078F DIAST BP <80 MM HG: CPT | Mod: CPTII,S$GLB,, | Performed by: OTOLARYNGOLOGY

## 2023-12-06 PROCEDURE — 1159F MED LIST DOCD IN RCRD: CPT | Mod: CPTII,S$GLB,, | Performed by: OTOLARYNGOLOGY

## 2023-12-06 PROCEDURE — 1159F MED LIST DOCD IN RCRD: CPT | Mod: CPTII,S$GLB,, | Performed by: PHYSICIAN ASSISTANT

## 2023-12-06 PROCEDURE — 3044F PR MOST RECENT HEMOGLOBIN A1C LEVEL <7.0%: ICD-10-PCS | Mod: CPTII,S$GLB,, | Performed by: OTOLARYNGOLOGY

## 2023-12-06 PROCEDURE — 3074F PR MOST RECENT SYSTOLIC BLOOD PRESSURE < 130 MM HG: ICD-10-PCS | Mod: CPTII,S$GLB,, | Performed by: PHYSICIAN ASSISTANT

## 2023-12-06 PROCEDURE — 99999 PR PBB SHADOW E&M-EST. PATIENT-LVL III: ICD-10-PCS | Mod: PBBFAC,,, | Performed by: OTOLARYNGOLOGY

## 2023-12-06 PROCEDURE — 1159F PR MEDICATION LIST DOCUMENTED IN MEDICAL RECORD: ICD-10-PCS | Mod: CPTII,S$GLB,, | Performed by: OTOLARYNGOLOGY

## 2023-12-06 PROCEDURE — 1101F PR PT FALLS ASSESS DOC 0-1 FALLS W/OUT INJ PAST YR: ICD-10-PCS | Mod: CPTII,S$GLB,, | Performed by: OTOLARYNGOLOGY

## 2023-12-06 PROCEDURE — 3044F PR MOST RECENT HEMOGLOBIN A1C LEVEL <7.0%: ICD-10-PCS | Mod: CPTII,S$GLB,, | Performed by: PHYSICIAN ASSISTANT

## 2023-12-06 PROCEDURE — 99999 PR PBB SHADOW E&M-EST. PATIENT-LVL IV: ICD-10-PCS | Mod: PBBFAC,,, | Performed by: PHYSICIAN ASSISTANT

## 2023-12-06 PROCEDURE — 3288F PR FALLS RISK ASSESSMENT DOCUMENTED: ICD-10-PCS | Mod: CPTII,S$GLB,, | Performed by: OTOLARYNGOLOGY

## 2023-12-06 PROCEDURE — 1125F PR PAIN SEVERITY QUANTIFIED, PAIN PRESENT: ICD-10-PCS | Mod: CPTII,S$GLB,, | Performed by: OTOLARYNGOLOGY

## 2023-12-06 PROCEDURE — 99999 PR PBB SHADOW E&M-EST. PATIENT-LVL IV: CPT | Mod: PBBFAC,,, | Performed by: PHYSICIAN ASSISTANT

## 2023-12-06 PROCEDURE — 99999 PR PBB SHADOW E&M-EST. PATIENT-LVL III: CPT | Mod: PBBFAC,,, | Performed by: OTOLARYNGOLOGY

## 2023-12-06 PROCEDURE — 3078F PR MOST RECENT DIASTOLIC BLOOD PRESSURE < 80 MM HG: ICD-10-PCS | Mod: CPTII,S$GLB,, | Performed by: PHYSICIAN ASSISTANT

## 2023-12-06 PROCEDURE — 1125F AMNT PAIN NOTED PAIN PRSNT: CPT | Mod: CPTII,S$GLB,, | Performed by: PHYSICIAN ASSISTANT

## 2023-12-06 PROCEDURE — 3008F BODY MASS INDEX DOCD: CPT | Mod: CPTII,S$GLB,, | Performed by: OTOLARYNGOLOGY

## 2023-12-06 PROCEDURE — 1159F PR MEDICATION LIST DOCUMENTED IN MEDICAL RECORD: ICD-10-PCS | Mod: CPTII,S$GLB,, | Performed by: PHYSICIAN ASSISTANT

## 2023-12-06 PROCEDURE — 3074F PR MOST RECENT SYSTOLIC BLOOD PRESSURE < 130 MM HG: ICD-10-PCS | Mod: CPTII,S$GLB,, | Performed by: OTOLARYNGOLOGY

## 2023-12-06 PROCEDURE — 1101F PT FALLS ASSESS-DOCD LE1/YR: CPT | Mod: CPTII,S$GLB,, | Performed by: OTOLARYNGOLOGY

## 2023-12-06 PROCEDURE — 99204 OFFICE O/P NEW MOD 45 MIN: CPT | Mod: S$GLB,,, | Performed by: OTOLARYNGOLOGY

## 2023-12-06 PROCEDURE — 1160F RVW MEDS BY RX/DR IN RCRD: CPT | Mod: CPTII,S$GLB,, | Performed by: PHYSICIAN ASSISTANT

## 2023-12-06 PROCEDURE — 1160F RVW MEDS BY RX/DR IN RCRD: CPT | Mod: CPTII,S$GLB,, | Performed by: OTOLARYNGOLOGY

## 2023-12-06 PROCEDURE — 1125F AMNT PAIN NOTED PAIN PRSNT: CPT | Mod: CPTII,S$GLB,, | Performed by: OTOLARYNGOLOGY

## 2023-12-06 PROCEDURE — 99203 OFFICE O/P NEW LOW 30 MIN: CPT | Mod: S$GLB,,, | Performed by: PHYSICIAN ASSISTANT

## 2023-12-06 PROCEDURE — 1160F PR REVIEW ALL MEDS BY PRESCRIBER/CLIN PHARMACIST DOCUMENTED: ICD-10-PCS | Mod: CPTII,S$GLB,, | Performed by: PHYSICIAN ASSISTANT

## 2023-12-06 PROCEDURE — 99204 PR OFFICE/OUTPT VISIT, NEW, LEVL IV, 45-59 MIN: ICD-10-PCS | Mod: S$GLB,,, | Performed by: OTOLARYNGOLOGY

## 2023-12-06 PROCEDURE — 1125F PR PAIN SEVERITY QUANTIFIED, PAIN PRESENT: ICD-10-PCS | Mod: CPTII,S$GLB,, | Performed by: PHYSICIAN ASSISTANT

## 2023-12-06 PROCEDURE — 3078F PR MOST RECENT DIASTOLIC BLOOD PRESSURE < 80 MM HG: ICD-10-PCS | Mod: CPTII,S$GLB,, | Performed by: OTOLARYNGOLOGY

## 2023-12-06 PROCEDURE — 3008F PR BODY MASS INDEX (BMI) DOCUMENTED: ICD-10-PCS | Mod: CPTII,S$GLB,, | Performed by: OTOLARYNGOLOGY

## 2023-12-06 NOTE — PROGRESS NOTES
SUBJECTIVE:     Chief Complaint & History of Present Illness:  Nano Sosa is a New patient 68 y.o. female who is seen here today with a complaint of    Chief Complaint   Patient presents with    Left Knee - Pain, Injury    .  Fracture of the left patella.  Patient is here today is a follow-up visit from the emergency room yesterday.  Suffered a fall onto her anterior knee while at an airport.  She was able to stand and ambulate following the fall and actually flew from [place] to [place] and was seen in the emergency room that evening.  X-rays at that time demonstrate minimally displaced vertical fracture of the lateral left patella.  Patient was placed in a knee immobilizer and crutch walking is here today for continuing care treatment.  She states that her pain has subsided substantially since being in the brace and minimizing her weight-bearing her swelling while still present has subsided to some degree.  She does have some ecchymosis in or about the knee and some generalized swelling in the lower leg  On a scale of 1-10, with 10 being worst pain imaginable, he rates this pain as 4 on good days and 6 on bad days.  she describes the pain as tender and sore.    Review of patient's allergies indicates:   Allergen Reactions    Penicillins Shortness Of Breath     Throat swelling    Poison ivy extract Dermatitis, Hives, Itching, Rash and Swelling    Poison sumac extract Blisters, Dermatitis, Hives, Itching, Rash and Swelling    Sulfa (sulfonamide antibiotics) Hives and Rash    Codeine Itching     Hallucinations, sweats    Erythromycin Itching and Swelling    Tessalon [benzonatate] Swelling    Unable to assess      TUNA--Flushing of skin         Current Outpatient Medications   Medication Sig Dispense Refill    acetaminophen (TYLENOL) 325 MG tablet Take 2 tablets (650 mg total) by mouth every 6 (six) hours as needed for Pain. 30 tablet 0    b complex vitamins tablet Take 1 tablet by mouth once daily.       calcium carbonate (OS-CONRAD) 600 mg calcium (1,500 mg) Tab Take 600 mg by mouth once.      fluticasone propionate (FLONASE) 50 mcg/actuation nasal spray 1 spray (50 mcg total) by Each Nostril route once daily. 16 g 3    ibuprofen (ADVIL,MOTRIN) 400 MG tablet Take 1 tablet (400 mg total) by mouth every 6 (six) hours as needed (pain). 20 tablet 0    multivitamin capsule Take 1 capsule by mouth once daily.      vitamin D (VITAMIN D3) 1000 units Tab Take 4,000 Units by mouth once daily.       No current facility-administered medications for this visit.       Past Medical History:   Diagnosis Date    General anesthetics causing adverse effect in therapeutic use     wakes up hyperventilating/chills    Goiter     Hashimoto's disease     Migraine     Osteopenia     Rectal mass     Ulcerative colitis     Urinary tract infection     Vitamin D deficiency        Past Surgical History:   Procedure Laterality Date    BUNIONECTOMY      right    colon sx      COLONOSCOPY N/A 12/19/2016    Procedure: COLONOSCOPY;  Surgeon: Alli Peña MD;  Location: [facility] ([address] [place] [address]);  Service: Endoscopy;  Laterality: N/A;    COLONOSCOPY N/A 1/7/2019    Procedure: COLONOSCOPY;  Surgeon: Vel Self MD;  [facility]  Service: Endoscopy;  Laterality: N/A;  Pt requests Dr. Peña, Pt ok with Dr. Self    COLONOSCOPY N/A 2/13/2023    Procedure: COLONOSCOPY;  Surgeon: Srinivas Linares MD;  Location: [address]);  Service: Endoscopy;  Laterality: N/A;  instructions via portal -   pt request Sutab  chgd from [facility]--1/17  Precall complete- [place]    f.e.s.s.  12/5/2014    FACIAL COSMETIC SURGERY      TOTAL ABDOMINAL HYSTERECTOMY  1994    [facility]       Vital Signs (Most Recent)  Vitals:    12/06/23 0812   BP: (!) 128/59   Pulse: 63           Review of Systems:  ROS:  Constitutional: no fever or chills  Eyes: no visual changes  ENT: no nasal congestion or sore throat  Respiratory: no cough or shortness  of breath, positive for pulmonary nodules  Cardiovascular: no chest pain or palpitations  Gastrointestinal: no nausea or vomiting, tolerating diet  Genitourinary: no hematuria or dysuria  Integument/Breast: no rash or pruritis  Hematologic/Lymphatic: no easy bruising or lymphadenopathy  Musculoskeletal: no arthralgias or myalgias  Neurological: no seizures or tremors  Behavioral/Psych: no auditory or visual hallucinations  Endocrine: no heat or cold intolerance, positive Hashimoto's thyroiditis vitamin-D deficiency osteoporosis without fracture                OBJECTIVE:     PHYSICAL EXAM:    Weight:  (no wt. pt. is on crutches fx lt.knee), General Appearance: Well nourished, well developed, in no acute distress.  Neurological: Mood & affect are normal.    left  Knee Exam:  Knee Range of Motion:  Not tested secondary to fracture   Effusion:  Mild  Condition of skin:intact  Location of tenderness:Patella and Patellar tendon   Strength:  Not tested secondary to fracture  Stability:  stable to testing, Lachman: stable, LCL: stable, MCL: stable, and PCL: stable  Varus /Valgus stress:  normal    RADIOGRAPHS:  X-rays from emergency room reviewed by me today demonstrate minimally displaced vertical patella fracture of the lateral aspect of the left patella joint spaces are well maintained    ASSESSMENT/PLAN:       ICD-10-CM ICD-9-CM   1. Other closed fracture of left patella, initial encounter  S82.092A 822.0       Plan: We discussed with the patient at length all the different treatment options available for  the knee including anti-inflammatories, acetaminophen, rest, ice, knee strengthening exercise, occasional cortisone injections for temporary relief, Viscosupplimentation injections, arthroscopic debridement osteotomy, and finally knee arthroplasty.   Will place patient in a hinged knee brace locked in extension she will continue crutches for support and stability toe-touch weight-bearing.  Continue ice rest and  elevation  Follow-up in 2 weeks with new x-rays sooner symptoms dictate

## 2023-12-06 NOTE — PROGRESS NOTES
Ms. Sosa     Vitals:    23 0958   BP: 109/75   Pulse: (!) 57   Resp: 18       Chief Complaint:  Nasal trauma     HPI:   is a 68-year-old white female who presents 2 days status post [facility] [place] when on her way home from a visit.  She was treated initially by EMTs and placed on the plane to come home.  She then visited the ED at [facility] where she was evaluated.  She sustained a fracture of her left patella and also nasal fracture and no dorsal nasal abrasions.  I have reviewed her maxillofacial CT scans in their entirety.  She has slight nasal congestion though not severe.  She is a past patient of mine having undergone FESS 9 years ago in 2014.  She has been doing well on her sinus rinses.    Review of Systems:  Constitutional:   weight loss or weight gain: Negative  Allergy/Immunologic:   Negative  Nasal Congestion/Obstruction:   Negative  Nosebleeds:   Negative  Sinus infections:   Negative  Headache/Facial Pain:   Positive for facial pain and swelling  Snoring/ABHILASH:   Negative  Throat: Infections/Pain:   Negative  Hoarseness/Speech Disturbance:   Negative  Trauma Hx:  Positive as above    Cardiovascular:  M/I Angina: Negative  Hypertension: Negative  Endocrine:    DM/Steroids: Negative  GI:   Dysphagia/Reflux: Negative  :   GYN Pregnancy: Negative  Renal:   Dialysis: Negative  Lymphatic:   Neck Mass/Lymphadenopathy: Negative  Muscoloskeletal:   Negative  Hematologic:   Bleeding Disorders/Anemia: Negative  Neurologic:    Cranial/Neuralgia: Negative  Pulmonary:   Asthma/SOB/Cough: Negative  Skin Disorders: Negative    Past Medical/Surgical/Family/Social History:    ENT Surgery: Negative  Occupational Exposure: Negative   Problems: Negative  Cancer: Negative    Past Family History:   Family history of Cancer: Negative    Past Social History:   Tobacco: Nonsmoker   Alcohol:  Once weekly Social Drinker      Allergies and medications:  Reviewed per med card.    Physical Examination:  Ears:   External auditory canals:  Clear   Hearing: Grossly intact   Tympanic Membranes: Clear  Nose:   External:  Her external nose show significant edema with some stellate like abrasions in the area of her rhinion.   Intranasal:  Her septum appears relatively straight with no evidence of septal hematoma.  She does have some intranasal edema present.  Mouth:   Intraorally: Lips, teeth, and gums:  She is missing her 2 front incisors which were knocked out with her fall.   Oropharynx: Normal   Mucosa: Normal   Tongue: Normal  Throat:      Palate: Normal palate with elevation   Tonsils: Normal   Posterior Pharynx: Normal  Fiberoptic exam: Not performed  Head/Face:     Inspection: Normal and atraumatic   Palpation/Percussion: Non tender   Facial strength: Normal and symmetric   Salivary glands: Normal  Neck: Supple  Thyroid: No masses  Lymphatics: No nodes  Respiratory:   Effort: Normal  Eyes:   Ocular Mobility: Normal   Vision: Grossly intact  Neuro/Psych:   Cranial Nerves: Grossly Intact   Orientation: Normal   Mood/Affect: Normal      Assessment/Plan:  I have discussed my findings with her in detail as well as my recommendations for treatment.  I have recommended that she avoid nose blowing for the next week to 10 days.  I have encouraged her to use nasal saline sprays regularly and avoid her sinus rinses for one-week.  She may return to clinic in 3-4 weeks after her swelling has resolved if she requires any follow-up examination.  I have also recommended that she place bacitracin ointment to her nasal abrasions twice daily for the next week to 10 days.

## 2023-12-18 ENCOUNTER — PATIENT MESSAGE (OUTPATIENT)
Dept: ORTHOPEDICS | Facility: CLINIC | Age: 68
End: 2023-12-18
Payer: MEDICARE

## 2023-12-18 ENCOUNTER — PATIENT MESSAGE (OUTPATIENT)
Dept: ENDOCRINOLOGY | Facility: CLINIC | Age: 68
End: 2023-12-18
Payer: MEDICARE

## 2023-12-18 LAB
MISCELLANEOUS TEST NAME: NORMAL
REFERENCE LAB: NORMAL
SPECIMEN TYPE: NORMAL
TEST RESULT: NORMAL

## 2023-12-18 NOTE — TELEPHONE ENCOUNTER
Spoke with patient over the phone regarding report of molecular markers.         Plan for regular yearly follow up in July/August. She has DXA scheduled in january

## 2023-12-20 ENCOUNTER — OFFICE VISIT (OUTPATIENT)
Dept: ORTHOPEDICS | Facility: CLINIC | Age: 68
End: 2023-12-20
Payer: MEDICARE

## 2023-12-20 ENCOUNTER — IMMUNIZATION (OUTPATIENT)
Dept: INTERNAL MEDICINE | Facility: CLINIC | Age: 68
End: 2023-12-20
Payer: MEDICARE

## 2023-12-20 ENCOUNTER — HOSPITAL ENCOUNTER (OUTPATIENT)
Dept: RADIOLOGY | Facility: HOSPITAL | Age: 68
Discharge: HOME | End: 2023-12-20
Attending: PHYSICIAN ASSISTANT
Payer: MEDICARE

## 2023-12-20 DIAGNOSIS — S82.092A OTHER CLOSED FRACTURE OF LEFT PATELLA, INITIAL ENCOUNTER: ICD-10-CM

## 2023-12-20 DIAGNOSIS — Z23 NEED FOR VACCINATION: Primary | ICD-10-CM

## 2023-12-20 DIAGNOSIS — S82.025D CLOSED NONDISPLACED LONGITUDINAL FRACTURE OF LEFT PATELLA WITH ROUTINE HEALING, SUBSEQUENT ENCOUNTER: Primary | ICD-10-CM

## 2023-12-20 PROCEDURE — 99213 PR OFFICE/OUTPT VISIT, EST, LEVL III, 20-29 MIN: ICD-10-PCS | Mod: S$GLB,,, | Performed by: PHYSICIAN ASSISTANT

## 2023-12-20 PROCEDURE — 90480 COVID-19 VAC, MRNA 2023 (MODERNA)(PF) 50 MCG/0.5 ML IM SUSR (12+): ICD-10-PCS | Mod: S$GLB,,, | Performed by: INTERNAL MEDICINE

## 2023-12-20 PROCEDURE — 91322 COVID-19 VAC, MRNA 2023 (MODERNA)(PF) 50 MCG/0.5 ML IM SUSR (12+): ICD-10-PCS | Mod: S$GLB,,, | Performed by: INTERNAL MEDICINE

## 2023-12-20 PROCEDURE — 1125F PR PAIN SEVERITY QUANTIFIED, PAIN PRESENT: ICD-10-PCS | Mod: CPTII,S$GLB,, | Performed by: PHYSICIAN ASSISTANT

## 2023-12-20 PROCEDURE — 3044F PR MOST RECENT HEMOGLOBIN A1C LEVEL <7.0%: ICD-10-PCS | Mod: CPTII,S$GLB,, | Performed by: PHYSICIAN ASSISTANT

## 2023-12-20 PROCEDURE — 73560 XR KNEE 1 OR 2 VIEW LEFT: ICD-10-PCS | Mod: 26,LT,, | Performed by: RADIOLOGY

## 2023-12-20 PROCEDURE — 3288F FALL RISK ASSESSMENT DOCD: CPT | Mod: CPTII,S$GLB,, | Performed by: PHYSICIAN ASSISTANT

## 2023-12-20 PROCEDURE — 99999 PR PBB SHADOW E&M-EST. PATIENT-LVL III: CPT | Mod: PBBFAC,,, | Performed by: PHYSICIAN ASSISTANT

## 2023-12-20 PROCEDURE — 90480 ADMN SARSCOV2 VAC 1/ONLY CMP: CPT | Mod: S$GLB,,, | Performed by: INTERNAL MEDICINE

## 2023-12-20 PROCEDURE — 1101F PR PT FALLS ASSESS DOC 0-1 FALLS W/OUT INJ PAST YR: ICD-10-PCS | Mod: CPTII,S$GLB,, | Performed by: PHYSICIAN ASSISTANT

## 2023-12-20 PROCEDURE — 1159F MED LIST DOCD IN RCRD: CPT | Mod: CPTII,S$GLB,, | Performed by: PHYSICIAN ASSISTANT

## 2023-12-20 PROCEDURE — 1101F PT FALLS ASSESS-DOCD LE1/YR: CPT | Mod: CPTII,S$GLB,, | Performed by: PHYSICIAN ASSISTANT

## 2023-12-20 PROCEDURE — 99999 PR PBB SHADOW E&M-EST. PATIENT-LVL III: ICD-10-PCS | Mod: PBBFAC,,, | Performed by: PHYSICIAN ASSISTANT

## 2023-12-20 PROCEDURE — 3044F HG A1C LEVEL LT 7.0%: CPT | Mod: CPTII,S$GLB,, | Performed by: PHYSICIAN ASSISTANT

## 2023-12-20 PROCEDURE — 3288F PR FALLS RISK ASSESSMENT DOCUMENTED: ICD-10-PCS | Mod: CPTII,S$GLB,, | Performed by: PHYSICIAN ASSISTANT

## 2023-12-20 PROCEDURE — 73560 X-RAY EXAM OF KNEE 1 OR 2: CPT | Mod: TC,LT

## 2023-12-20 PROCEDURE — 91322 SARSCOV2 VAC 50 MCG/0.5ML IM: CPT | Mod: S$GLB,,, | Performed by: INTERNAL MEDICINE

## 2023-12-20 PROCEDURE — 99213 OFFICE O/P EST LOW 20 MIN: CPT | Mod: S$GLB,,, | Performed by: PHYSICIAN ASSISTANT

## 2023-12-20 PROCEDURE — 1160F RVW MEDS BY RX/DR IN RCRD: CPT | Mod: CPTII,S$GLB,, | Performed by: PHYSICIAN ASSISTANT

## 2023-12-20 PROCEDURE — 1125F AMNT PAIN NOTED PAIN PRSNT: CPT | Mod: CPTII,S$GLB,, | Performed by: PHYSICIAN ASSISTANT

## 2023-12-20 PROCEDURE — 1160F PR REVIEW ALL MEDS BY PRESCRIBER/CLIN PHARMACIST DOCUMENTED: ICD-10-PCS | Mod: CPTII,S$GLB,, | Performed by: PHYSICIAN ASSISTANT

## 2023-12-20 PROCEDURE — 73560 X-RAY EXAM OF KNEE 1 OR 2: CPT | Mod: 26,LT,, | Performed by: RADIOLOGY

## 2023-12-20 PROCEDURE — 1159F PR MEDICATION LIST DOCUMENTED IN MEDICAL RECORD: ICD-10-PCS | Mod: CPTII,S$GLB,, | Performed by: PHYSICIAN ASSISTANT

## 2023-12-20 NOTE — PROGRESS NOTES
SUBJECTIVE:     Chief Complaint & History of Present Illness:  Nano Sosa is a Established patient 68 y.o. female who is seen here today with a complaint of    Chief Complaint   Patient presents with    Left Knee - Pain    .  Patient is here today for continuing care treatment for patellar fracture of the left knee initially suffered 12/05/2023.  She was last seen treated the clinic for this 12/06/2023 that time we would placed her in a hinged knee brace locked in extension toe-touch weight-bearing with crutch walking.  She is done very well since our last visit is having little to no pain in or about the knee the bruising has subsided substantially and her swelling has just about returned to baseline  On a scale of 1-10, with 10 being worst pain imaginable, he rates this pain as 1 on good days and 4 on bad days.  she describes the pain as achy.    Review of patient's allergies indicates:   Allergen Reactions    Penicillins Shortness Of Breath     Throat swelling    Poison ivy extract Dermatitis, Hives, Itching, Rash and Swelling    Poison sumac extract Blisters, Dermatitis, Hives, Itching, Rash and Swelling    Sulfa (sulfonamide antibiotics) Hives and Rash    Codeine Itching     Hallucinations, sweats    Erythromycin Itching and Swelling    Tessalon [benzonatate] Swelling    Unable to assess      TUNA--Flushing of skin         Current Outpatient Medications   Medication Sig Dispense Refill    acetaminophen (TYLENOL) 325 MG tablet Take 2 tablets (650 mg total) by mouth every 6 (six) hours as needed for Pain. 30 tablet 0    b complex vitamins tablet Take 1 tablet by mouth once daily.      calcium carbonate (OS-CONRAD) 600 mg calcium (1,500 mg) Tab Take 600 mg by mouth once.      fluticasone propionate (FLONASE) 50 mcg/actuation nasal spray 1 spray (50 mcg total) by Each Nostril route once daily. 16 g 3    multivitamin capsule Take 1 capsule by mouth once daily.      vitamin D (VITAMIN D3) 1000 units Tab Take  4,000 Units by mouth once daily.      ibuprofen (ADVIL,MOTRIN) 400 MG tablet Take 1 tablet (400 mg total) by mouth every 6 (six) hours as needed (pain). (Patient not taking: Reported on 12/20/2023) 20 tablet 0     No current facility-administered medications for this visit.       Past Medical History:   Diagnosis Date    General anesthetics causing adverse effect in therapeutic use     wakes up hyperventilating/chills    Goiter     Hashimoto's disease     Migraine     Osteopenia     Rectal mass     Ulcerative colitis     Urinary tract infection     Vitamin D deficiency        Past Surgical History:   Procedure Laterality Date    BUNIONECTOMY      right    colon sx      COLONOSCOPY N/A 12/19/2016    Procedure: COLONOSCOPY;  Surgeon: Alli Peña MD;  [facility]  Service: Endoscopy;  Laterality: N/A;    COLONOSCOPY N/A 1/7/2019    Procedure: COLONOSCOPY;  Surgeon: Vel Self MD;  [facility]  Service: Endoscopy;  Laterality: N/A;  Pt requests Dr. Peña, Pt ok with Dr. Self    COLONOSCOPY N/A 2/13/2023    Procedure: COLONOSCOPY;  Surgeon: Srinivas Linares MD;  [facility]: [facility]  Service: Endoscopy;  Laterality: N/A;  instructions via portal -   pt request Sutab  chgd from Aramis Ramsay-SHYAM--1/17  Precall complete- [place]    f.e.s.s.  12/5/2014    FACIAL COSMETIC SURGERY      TOTAL ABDOMINAL HYSTERECTOMY  1994    [facility]       Vital Signs (Most Recent)  There were no vitals filed for this visit.        Review of Systems:  ROS:  Constitutional: no fever or chills  Eyes: no visual changes  ENT: no nasal congestion or sore throat  Respiratory: no cough or shortness of breath, positive for pulmonary nodules  Cardiovascular: no chest pain or palpitations  Gastrointestinal: no nausea or vomiting, tolerating diet  Genitourinary: no hematuria or dysuria  Integument/Breast: no rash or pruritis  Hematologic/Lymphatic: no easy bruising or  lymphadenopathy  Musculoskeletal: no arthralgias or myalgias  Neurological: no seizures or tremors  Behavioral/Psych: no auditory or visual hallucinations  Endocrine: no heat or cold intolerance, positive Hashimoto's thyroiditis vitamin-D deficiency osteoporosis without fracture                OBJECTIVE:     PHYSICAL EXAM:     , General Appearance: Well nourished, well developed, in no acute distress.  Neurological: Mood & affect are normal.    left  Knee Exam:  Knee Range of Motion:  Not tested secondary to fracture   Effusion:Mild  Condition of skin:  Intact bruising is subsiding  Location of tenderness:Patella and Patellar tendon   Strength:  Not tested secondary to fracture  Stability:  stable to testing  Varus /Valgus stress:  normal    RADIOGRAPHS:  X-rays taken today films reviewed by me demonstrate fracture remains in good alignment with no change in angulation or rotation no new callus formation can it be seen within the fracture line    ASSESSMENT/PLAN:       ICD-10-CM ICD-9-CM   1. Other closed fracture of left patella, initial encounter  S82.092A 822.0       Plan:  Open hinged knee brace with 35° of flexion patient will remain toe-touch weight-bearing crutches for safety and stability.  Follow-up in 2 weeks with new x-rays sooner symptoms dictate

## 2023-12-25 NOTE — TELEPHONE ENCOUNTER
----- Message from Eva Shah MD sent at 5/8/2018  4:31 PM CDT -----  Regarding: Referral  Ami,    This patient is interested in seeing you for weight loss.      Eva  
..Patient referred by Dr. Shah for Health coaching (weight loss,  lifestyle changes).  Called patient and gave an explanation about Health Coaching program and invited participation.   Patient states she would like to participate.  Set appointment for 5/29/18 at 730.  Gave direct contact number to call if he/ she has any questions [phone].   Ami Kraus RN  Health       
none

## 2023-12-27 ENCOUNTER — PATIENT MESSAGE (OUTPATIENT)
Dept: INTERNAL MEDICINE | Facility: CLINIC | Age: 68
End: 2023-12-27
Payer: MEDICARE

## 2023-12-31 RX ORDER — FERROUS SULFATE 325(65) MG
325 TABLET ORAL DAILY
Qty: 90 TABLET | Refills: 3 | Status: SHIPPED | OUTPATIENT
Start: 2023-12-31 | End: 2024-12-25

## 2024-01-02 ENCOUNTER — HOSPITAL ENCOUNTER (OUTPATIENT)
Dept: RADIOLOGY | Facility: HOSPITAL | Age: 69
Discharge: HOME | End: 2024-01-02
Attending: INTERNAL MEDICINE
Payer: MEDICARE

## 2024-01-02 VITALS — HEIGHT: 67 IN | BODY MASS INDEX: 26.68 KG/M2 | WEIGHT: 170 LBS

## 2024-01-02 DIAGNOSIS — Z12.31 ENCOUNTER FOR SCREENING MAMMOGRAM FOR BREAST CANCER: ICD-10-CM

## 2024-01-02 PROCEDURE — 77067 SCR MAMMO BI INCL CAD: CPT | Mod: 26,,, | Performed by: RADIOLOGY

## 2024-01-02 PROCEDURE — 77067 SCR MAMMO BI INCL CAD: CPT | Mod: TC

## 2024-01-02 PROCEDURE — 77063 BREAST TOMOSYNTHESIS BI: CPT | Mod: 26,,, | Performed by: RADIOLOGY

## 2024-01-05 ENCOUNTER — OFFICE VISIT (OUTPATIENT)
Dept: ORTHOPEDICS | Facility: CLINIC | Age: 69
End: 2024-01-05
Payer: MEDICARE

## 2024-01-05 ENCOUNTER — HOSPITAL ENCOUNTER (OUTPATIENT)
Dept: RADIOLOGY | Facility: HOSPITAL | Age: 69
Discharge: HOME | End: 2024-01-05
Attending: PHYSICIAN ASSISTANT
Payer: MEDICARE

## 2024-01-05 DIAGNOSIS — S82.025D CLOSED NONDISPLACED LONGITUDINAL FRACTURE OF LEFT PATELLA WITH ROUTINE HEALING, SUBSEQUENT ENCOUNTER: Primary | ICD-10-CM

## 2024-01-05 DIAGNOSIS — S82.025D CLOSED NONDISPLACED LONGITUDINAL FRACTURE OF LEFT PATELLA WITH ROUTINE HEALING, SUBSEQUENT ENCOUNTER: ICD-10-CM

## 2024-01-05 PROCEDURE — 1159F MED LIST DOCD IN RCRD: CPT | Mod: CPTII,S$GLB,, | Performed by: PHYSICIAN ASSISTANT

## 2024-01-05 PROCEDURE — 1160F RVW MEDS BY RX/DR IN RCRD: CPT | Mod: CPTII,S$GLB,, | Performed by: PHYSICIAN ASSISTANT

## 2024-01-05 PROCEDURE — 99999 PR PBB SHADOW E&M-EST. PATIENT-LVL III: CPT | Mod: PBBFAC,,, | Performed by: PHYSICIAN ASSISTANT

## 2024-01-05 PROCEDURE — 73560 X-RAY EXAM OF KNEE 1 OR 2: CPT | Mod: TC,LT

## 2024-01-05 PROCEDURE — 1126F AMNT PAIN NOTED NONE PRSNT: CPT | Mod: CPTII,S$GLB,, | Performed by: PHYSICIAN ASSISTANT

## 2024-01-05 PROCEDURE — 73560 X-RAY EXAM OF KNEE 1 OR 2: CPT | Mod: 26,LT,, | Performed by: RADIOLOGY

## 2024-01-05 PROCEDURE — 99213 OFFICE O/P EST LOW 20 MIN: CPT | Mod: S$GLB,,, | Performed by: PHYSICIAN ASSISTANT

## 2024-01-05 NOTE — PROGRESS NOTES
SUBJECTIVE:     Chief Complaint & History of Present Illness:  Nano Sosa is a Established patient 68 y.o. female who is seen here today with a complaint of    Chief Complaint   Patient presents with    Left Knee - Pain, Injury    .  She is a patient well-known to me was last seen treated the clinic for this condition 12/20/2023 that time she had been making very good progress tolerating her brace well she returns today he is having little to no pain in or about the knee she has been weight-bearing well with her crutches she has no more swelling no tenderness to palpation  On a scale of 1-10, with 10 being worst pain imaginable, he rates this pain as 1 on good days and 2 on bad days.  she describes the pain as sore.    Review of patient's allergies indicates:   Allergen Reactions    Penicillins Shortness Of Breath     Throat swelling    Poison ivy extract Dermatitis, Hives, Itching, Rash and Swelling    Poison sumac extract Blisters, Dermatitis, Hives, Itching, Rash and Swelling    Sulfa (sulfonamide antibiotics) Hives and Rash    Codeine Itching     Hallucinations, sweats    Erythromycin Itching and Swelling    Tessalon [benzonatate] Swelling    Unable to assess      TUNA--Flushing of skin         Current Outpatient Medications   Medication Sig Dispense Refill    acetaminophen (TYLENOL) 325 MG tablet Take 2 tablets (650 mg total) by mouth every 6 (six) hours as needed for Pain. 30 tablet 0    b complex vitamins tablet Take 1 tablet by mouth once daily.      calcium carbonate (OS-CONRAD) 600 mg calcium (1,500 mg) Tab Take 600 mg by mouth once.      ferrous sulfate (FEOSOL) 325 mg (65 mg iron) Tab tablet Take 1 tablet (325 mg total) by mouth once daily. 90 tablet 3    fluticasone propionate (FLONASE) 50 mcg/actuation nasal spray 1 spray (50 mcg total) by Each Nostril route once daily. 16 g 3    ibuprofen (ADVIL,MOTRIN) 400 MG tablet Take 1 tablet (400 mg total) by mouth every 6 (six) hours as needed (pain). 20  tablet 0    multivitamin capsule Take 1 capsule by mouth once daily.      vitamin D (VITAMIN D3) 1000 units Tab Take 4,000 Units by mouth once daily.       No current facility-administered medications for this visit.       Past Medical History:   Diagnosis Date    General anesthetics causing adverse effect in therapeutic use     wakes up hyperventilating/chills    Goiter     Hashimoto's disease     Migraine     Osteopenia     Rectal mass     Ulcerative colitis     Urinary tract infection     Vitamin D deficiency        Past Surgical History:   Procedure Laterality Date    BUNIONECTOMY      right    colon sx      COLONOSCOPY N/A 12/19/2016    Procedure: COLONOSCOPY;  Surgeon: Alli Peña MD;  Location: [facility] ([address] [place] [address]);  Service: Endoscopy;  Laterality: N/A;    COLONOSCOPY N/A 1/7/2019    Procedure: COLONOSCOPY;  Surgeon: Vel Self MD;  [facility]  Service: Endoscopy;  Laterality: N/A;  Pt requests Dr. Peña, Pt ok with Dr. Self    COLONOSCOPY N/A 2/13/2023    Procedure: COLONOSCOPY;  Surgeon: Srinivas Linares MD;  Location: [facility] ([address] [place] [address]);  Service: Endoscopy;  Laterality: N/A;  instructions via portal -   pt request Sutab  chgd from Aramis to Ramsay---1/17  Precall complete- [place]    f.e.s.s.  12/5/2014    FACIAL COSMETIC SURGERY      TOTAL ABDOMINAL HYSTERECTOMY  1994    [facility]       Vital Signs (Most Recent)  There were no vitals filed for this visit.        Review of Systems:  ROS:  Constitutional: no fever or chills  Eyes: no visual changes  ENT: no nasal congestion or sore throat  Respiratory: no cough or shortness of breath, positive for pulmonary nodules  Cardiovascular: no chest pain or palpitations  Gastrointestinal: no nausea or vomiting, tolerating diet  Genitourinary: no hematuria or dysuria  Integument/Breast: no rash or pruritis  Hematologic/Lymphatic: no easy bruising or lymphadenopathy  Musculoskeletal: no arthralgias or myalgias  Neurological: no  seizures or tremors  Behavioral/Psych: no auditory or visual hallucinations  Endocrine: no heat or cold intolerance, positive Hashimoto's thyroiditis vitamin-D deficiency osteoporosis without fracture                OBJECTIVE:     PHYSICAL EXAM:     , General Appearance: Well nourished, well developed, in no acute distress.  Neurological: Mood & affect are normal.    left  Knee Exam:  Knee Range of Motion:0-115 degrees flexion   Effusion:none  Condition of skin:intact  Location of tenderness:None   Strength:5 of 5  Stability:  Lachman: stable, LCL: stable, MCL: stable, PCL: stable, and posteromedial (dial): stable  Varus /Valgus stress:  normal  Jose R:   negative/negative    RADIOGRAPHS:  X-rays taken today films reviewed by me demonstrate fracture remains in good alignment with no change in angulation and rotation there is new callus formation within the fracture site    ASSESSMENT/PLAN:       ICD-10-CM ICD-9-CM   1. Closed nondisplaced longitudinal fracture of left patella with routine healing, subsequent encounter  S82.025D V54.16       Plan: We discussed with the patient at length all the different treatment options available for  the knee including anti-inflammatories, acetaminophen, rest, ice, knee strengthening exercise, occasional cortisone injections for temporary relief, Viscosupplimentation injections, arthroscopic debridement osteotomy, and finally knee arthroplasty.   Patient will continue with the knee brace for activities involving higher stresses (yoga) slowly weaning off of the next 2 weeks.  She is weight-bearing as tolerated from this point follow-up p.r.n.

## 2024-01-07 ENCOUNTER — PATIENT MESSAGE (OUTPATIENT)
Dept: INTERNAL MEDICINE | Facility: CLINIC | Age: 69
End: 2024-01-07
Payer: MEDICARE

## 2024-01-17 ENCOUNTER — HOSPITAL ENCOUNTER (OUTPATIENT)
Dept: RADIOLOGY | Facility: CLINIC | Age: 69
Discharge: HOME | End: 2024-01-17
Attending: INTERNAL MEDICINE
Payer: MEDICARE

## 2024-01-17 DIAGNOSIS — Z78.0 ASYMPTOMATIC MENOPAUSAL STATE: ICD-10-CM

## 2024-01-17 PROCEDURE — 77080 DXA BONE DENSITY AXIAL: CPT | Mod: 26,,, | Performed by: INTERNAL MEDICINE

## 2024-01-17 PROCEDURE — 77080 DXA BONE DENSITY AXIAL: CPT | Mod: TC

## 2024-01-30 DIAGNOSIS — Z00.00 ENCOUNTER FOR MEDICARE ANNUAL WELLNESS EXAM: ICD-10-CM

## 2024-01-30 NOTE — PROGRESS NOTES
"Subjective:       Patient ID: Nano Sosa is a 67 y.o. female.    Chief Complaint: Dysuria (/)    Pt here for possible UTI. Messaged on 8-8-22 stating, "I think I have a urinary track/bladder infection. Can i come in to give a urine sample? Peeing a lot at night/urgency but not much pee". Has been ongoing for 2 months. Has been drinking plenty of water.    I have reviewed the HPI/ROS info pt entered in portal prior to visit today below       Dysuria   This is a recurrent problem. The current episode started 1 to 4 weeks ago. The problem occurs intermittently. The problem has been waxing and waning. The quality of the pain is described as aching and burning. The pain is at a severity of 8/10. The pain is moderate. She is not sexually active. There is no history of pyelonephritis. Associated symptoms include frequency, sweats and urgency. Pertinent negatives include no chills, flank pain, hematuria, nausea, vomiting or constipation. She has tried nothing for the symptoms. The treatment provided no relief. There is no history of catheterization, diabetes insipidus, diabetes mellitus, genitourinary reflux, hypertension, kidney stones, recurrent UTIs, a single kidney, STD, urinary stasis or a urological procedure.     Review of Systems   Constitutional: Negative for activity change, appetite change, chills, diaphoresis, fatigue, fever and unexpected weight change.   Respiratory: Negative for chest tightness and shortness of breath.    Cardiovascular: Negative for chest pain, palpitations, leg swelling and claudication.   Gastrointestinal: Negative for abdominal pain, blood in stool, constipation, diarrhea, nausea, vomiting, reflux and fecal incontinence.   Endocrine: Negative for cold intolerance, heat intolerance, polydipsia, polyphagia and polyuria.   Genitourinary: Positive for dysuria, frequency, nocturia, pelvic pain and urgency. Negative for bladder incontinence, decreased urine volume, difficulty urinating, " PROCEDURE/SURGERY CLEARANCE FORM      Encounter Date: 1/30/2024    Patient: Kristin Balderrama  YOB: 1991    CARDIOLOGIST:  John Leon M.D.    REFERRING DOCTOR:  No ref. provider found      PROCEDURE/SURGERY CLEARANCE FORM    Date: 1/30/2024   Patient Name: Kristin Balderrama    Dear Surgeon or Proceduralist,      Thank you for your request for cardiac stratification of our mutual patient Kristin Balderrama 1991. We have reviewed their [facility] records; and to the best of our understanding this patient has not had stenting, ablation, cardiothoracic surgery or hospitalization for cardiovascular reasons in the past 6 months.  Kristin Balderrama has been seen within the past 18 months and is considered to have non-modifiable cardiac risk for this low-risk procedure/surgery. They may proceed from a cardiovascular standpoint and may hold their antiplatelet/anticoagulation as briefly as possible. Please have patient resume this medication when hemodynamically stable to do so.     Aspirin or Prasugrel   - hold 7 days prior to procedure/surgery, resume when hemodynamically stable      Clopidrogrel or Ticagrelor  - hold 7 days for all neurological procedures, hold 5 days prior to all other procedure/surgery,  resume when hemodynamically stable     Warfarin - hold 7 days for all neurological procedures, hold 5 days prior to all other procedure/surgery and coordinate with [facility] Anticoagulation [facility] ([phone]) INR testing and dose management.      Pradaxa/Xarelto/Eliquis/Savesya - hold 1 day prior to procedure for low bleeding risk procedure, 2 days for high bleeding risk procedure, or consider holding 3 days or longer for patients with reduced kidney function (CrCl <30mL/min) or spinal/cranial surgeries/procedures.      If they have a mechanical heart valve, please coordinate with [facility] Anticoagulation Service ([phone]) the proper management of their anticoagulant in the  enuresis, flank pain and hematuria.   Musculoskeletal: Negative for arthralgias, back pain, leg pain and myalgias.   Allergic/Immunologic: Negative for environmental allergies and food allergies.   Neurological: Negative for dizziness, light-headedness, numbness and headaches.   Hematological: Negative for adenopathy. Does not bruise/bleed easily.   Psychiatric/Behavioral: Negative for suicidal ideas.     Review of patient's allergies indicates:   Allergen Reactions    Penicillins Shortness Of Breath     Throat swelling    Poison ivy extract Dermatitis, Hives, Itching, Rash and Swelling    Poison sumac extract Blisters, Dermatitis, Hives, Itching, Rash and Swelling    Sulfa (sulfonamide antibiotics) Hives and Rash    Codeine Itching     Hallucinations, sweats    Erythromycin Itching and Swelling    Tessalon [benzonatate] Swelling    Unable to assess      TUNA--Flushing of skin       Current Outpatient Medications:     b complex vitamins tablet, Take 1 tablet by mouth once daily., Disp: , Rfl:     cholecalciferol, vitamin D3, 75 mcg (3,000 unit) Tab, Take 1 tablet by mouth once daily., Disp: , Rfl:     multivitamin capsule, Take 1 capsule by mouth once daily., Disp: , Rfl:     diclofenac sodium 1 % Gel, Apply 2 g topically 4 (four) times daily., Disp: 400 g, Rfl: 0    Patient Active Problem List   Diagnosis    Thyroid nodule    History of Hashimoto thyroiditis    Environmental allergies    History of colon polyps    Mild vitamin D deficiency    Osteopenia    Rectal mass    Special screening for malignant neoplasms, colon    Right ankle instability    Pulmonary nodules    Screening for colon cancer    Nasal fracture     Past Medical History:   Diagnosis Date    General anesthetics causing adverse effect in therapeutic use     wakes up hyperventilating/chills    Goiter     Hashimoto's disease     Migraine     Osteopenia     Rectal mass     Ulcerative colitis     Urinary tract infection   periprocedural or perioperative period.      Some patients have higher risk for cardiovascular complications or holding medication. If our patient has had prior complications of holding antiplatelet or anticoagulants in the past and we have seen them after these events, we have addressed these concerns with the patient. They are at an unknown degree of increased risk for recurrent complication.  You may hold anticoagulation/antiplatelets for the procedure or surgery if the benefits of the procedure or surgery outweigh this nonmodifiable risk.      If Kristin Balderrama 1991 has new symptoms of heart failure decompensation, unstable arrythmia, or angina please reach out and we will assess the patient.      If you have other patient-specific concerns, please feel free to reach out to the patient's cardiologist directly at [phone].     Thank you,     John Leon M.D.  Electronically Signed    [facility]             "   Vitamin D deficiency      Past Surgical History:   Procedure Laterality Date    BUNIONECTOMY      right    colon sx      COLONOSCOPY N/A 2016    Procedure: COLONOSCOPY;  Surgeon: Alli Peña MD;  Location: [facility] ([address] [place] [address]);  Service: Endoscopy;  Laterality: N/A;    COLONOSCOPY N/A 2019    Procedure: COLONOSCOPY;  Surgeon: Vel Self MD;  Location: [facility] ([address] [place] [address]);  Service: Endoscopy;  Laterality: N/A;  Pt requests Dr. Peña, Pt ok with Dr. Self    f.e.s.s.  2014    FACIAL COSMETIC SURGERY      TOTAL ABDOM[facility]CTOMY      [facility]     Social History     Socioeconomic History    Marital status: Single   Tobacco Use    Smoking status: Former Smoker     Packs/day: 1.00     Years: 30.00     Pack years: 30.00     Types: Cigarettes     Quit date: 1999     Years since quittin.0    Smokeless tobacco: Never Used   Substance and Sexual Activity    Alcohol use: Yes     Alcohol/week: 6.0 standard drinks     Types: 4 Glasses of wine, 2 Standard drinks or equivalent per week     Comment: weekends    Drug use: No    Sexual activity: Not Currently     Partners: Male     Birth control/protection: See Surgical Hx     Family History   Problem Relation Age of Onset    Rheum arthritis Mother     Migraines Mother     Breast cancer Maternal Aunt     Heart disease Maternal Uncle     Hypertension Maternal Uncle     Colon cancer Maternal Aunt     Anesthesia problems Neg Hx     Diabetes Neg Hx     Ovarian cancer Neg Hx            Objective:       Vitals:    22 1335   BP: 124/68   Pulse: 64   Resp: 18   Temp: 97.9 °F (36.6 °C)   Weight: 98.3 kg (216 lb 11.4 oz)   Height: 5' 7" (1.702 m)   PainSc:   4   PainLoc: Abdomen     Body mass index is 33.94 kg/m².    Physical Exam  Vitals and nursing note reviewed.   Constitutional:       Appearance: She is obese.   HENT:      Head: Normocephalic.      Nose: Nose normal.      Mouth/Throat:      Mouth: Mucous " membranes are moist.   Eyes:      Conjunctiva/sclera: Conjunctivae normal.   Cardiovascular:      Rate and Rhythm: Normal rate and regular rhythm.      Pulses: Normal pulses.      Heart sounds: Normal heart sounds.   Pulmonary:      Effort: Pulmonary effort is normal.      Breath sounds: Normal breath sounds.   Abdominal:      General: Abdomen is flat. Bowel sounds are normal.      Palpations: Abdomen is soft.      Tenderness: There is no right CVA tenderness or left CVA tenderness.   Musculoskeletal:         General: Normal range of motion.      Cervical back: Normal range of motion.   Skin:     General: Skin is warm and dry.      Capillary Refill: Capillary refill takes less than 2 seconds.   Neurological:      General: No focal deficit present.      Mental Status: She is alert and oriented to person, place, and time.   Psychiatric:         Mood and Affect: Mood normal.         Behavior: Behavior normal.         Thought Content: Thought content normal.         Judgment: Judgment normal.         Assessment:       Problem List Items Addressed This Visit    None     Visit Diagnoses     Dysuria    -  Primary    Relevant Orders    POCT urinalysis, dipstick or tablet reag (Completed)    Urine culture    Nocturia        Relevant Orders    POCT urinalysis, dipstick or tablet reag (Completed)    Urine culture    Urinary urgency        Relevant Orders    POCT urinalysis, dipstick or tablet reag (Completed)    Urine culture    BMI 33.0-33.9,adult        Obesity (BMI 30-39.9)        Need for shingles vaccine        Relevant Orders    (In Office Administered) Zoster Recombinant Vaccine    Acute lower UTI        Relevant Medications    nitrofurantoin (MACRODANTIN) 100 MG capsule          Plan:       Nano was seen today for dysuria.    Diagnoses and all orders for this visit:    Dysuria  -     POCT urinalysis, dipstick or tablet reag  -     Urine culture    Nocturia  -     POCT urinalysis, dipstick or tablet reag  -     Urine  culture    Urinary urgency  -     POCT urinalysis, dipstick or tablet reag  -     Urine culture    BMI 33.0-33.9,adult  BMI reviewed    Obesity (BMI 30-39.9)  BMI reviewed.    Diet and exercise to lose weight.    Need for shingles vaccine  -     (In Office Administered) Zoster Recombinant Vaccine    Acute lower UTI  -     nitrofurantoin (MACRODANTIN) 100 MG capsule; Take 1 capsule (100 mg total) by mouth every 12 (twelve) hours. for 7 days    Meds as prescribed.    Drink plenty of water to flush bladder.    Avoid caffeine, sugary drinks, and ETOH.    Wipe from front to back.    Do not hold bladder when needing to void for long periods.    Will call with culture results when received and if therapy needs to change    Self care instructions provided in AVS    Follow up if symptoms worsen or fail to improve.

## 2024-02-01 ENCOUNTER — PATIENT MESSAGE (OUTPATIENT)
Dept: VASCULAR SURGERY | Facility: CLINIC | Age: 69
End: 2024-02-01
Payer: MEDICARE

## 2024-02-02 ENCOUNTER — TELEPHONE (OUTPATIENT)
Dept: PODIATRY | Facility: CLINIC | Age: 69
End: 2024-02-02
Payer: MEDICARE

## 2024-02-06 ENCOUNTER — OFFICE VISIT (OUTPATIENT)
Dept: PODIATRY | Facility: CLINIC | Age: 69
End: 2024-02-06
Payer: MEDICARE

## 2024-02-06 VITALS
SYSTOLIC BLOOD PRESSURE: 122 MMHG | BODY MASS INDEX: 30.29 KG/M2 | HEIGHT: 67 IN | HEART RATE: 57 BPM | DIASTOLIC BLOOD PRESSURE: 68 MMHG | WEIGHT: 193 LBS

## 2024-02-06 DIAGNOSIS — M79.672 PAIN IN BOTH FEET: ICD-10-CM

## 2024-02-06 DIAGNOSIS — M21.619 BUNION OF GREAT TOE: Primary | ICD-10-CM

## 2024-02-06 DIAGNOSIS — M79.671 PAIN IN BOTH FEET: ICD-10-CM

## 2024-02-06 PROCEDURE — 1125F AMNT PAIN NOTED PAIN PRSNT: CPT | Mod: CPTII,S$GLB,, | Performed by: PODIATRIST

## 2024-02-06 PROCEDURE — 3288F FALL RISK ASSESSMENT DOCD: CPT | Mod: CPTII,S$GLB,, | Performed by: PODIATRIST

## 2024-02-06 PROCEDURE — 3074F SYST BP LT 130 MM HG: CPT | Mod: CPTII,S$GLB,, | Performed by: PODIATRIST

## 2024-02-06 PROCEDURE — 3008F BODY MASS INDEX DOCD: CPT | Mod: CPTII,S$GLB,, | Performed by: PODIATRIST

## 2024-02-06 PROCEDURE — 1159F MED LIST DOCD IN RCRD: CPT | Mod: CPTII,S$GLB,, | Performed by: PODIATRIST

## 2024-02-06 PROCEDURE — 99999 PR PBB SHADOW E&M-EST. PATIENT-LVL III: CPT | Mod: PBBFAC,,, | Performed by: PODIATRIST

## 2024-02-06 PROCEDURE — 1100F PTFALLS ASSESS-DOCD GE2>/YR: CPT | Mod: CPTII,S$GLB,, | Performed by: PODIATRIST

## 2024-02-06 PROCEDURE — 99203 OFFICE O/P NEW LOW 30 MIN: CPT | Mod: S$GLB,,, | Performed by: PODIATRIST

## 2024-02-06 PROCEDURE — 3078F DIAST BP <80 MM HG: CPT | Mod: CPTII,S$GLB,, | Performed by: PODIATRIST

## 2024-02-07 ENCOUNTER — TELEPHONE (OUTPATIENT)
Dept: VASCULAR SURGERY | Facility: CLINIC | Age: 69
End: 2024-02-07
Payer: MEDICARE

## 2024-02-07 NOTE — TELEPHONE ENCOUNTER
Spoke with patient and informed that she is auth for procedure and we will attempt to get her added in if possible, potentially on 2/16/24.

## 2024-02-09 ENCOUNTER — PATIENT MESSAGE (OUTPATIENT)
Dept: ENDOSCOPY | Facility: HOSPITAL | Age: 69
End: 2024-02-09
Payer: MEDICARE

## 2024-02-13 NOTE — PROGRESS NOTES
Subjective:      Patient ID: Nano Sosa is a 68 y.o. female.    Chief Complaint:   Foot Pain (Left foot pain by 5th metatarsal and great toe, had imaging done - screw backing out of right bunion )    Nano is a 68 y.o. female who presents to the podiatry clinic  with complaint of  bilateral foot pain. Onset of the symptoms was several months ago. Precipitating event: none known. Current symptoms include: ability to bear weight, but with some pain. Aggravating factors: any weight bearing. Symptoms have been well-controlled. Patient has had no prior foot problems. Evaluation to date: none. Treatment to date: avoidance of offending activity. Patients rates pain 5/10 on pain scale.    Review of Systems   Constitutional: Negative for chills, decreased appetite, fever and malaise/fatigue.   HENT:  Negative for congestion, hearing loss, nosebleeds and tinnitus.    Eyes:  Negative for double vision, pain, photophobia and visual disturbance.   Cardiovascular:  Negative for chest pain, claudication, cyanosis and leg swelling.   Respiratory:  Negative for cough, hemoptysis, shortness of breath and wheezing.    Endocrine: Negative for cold intolerance and heat intolerance.   Hematologic/Lymphatic: Negative for adenopathy and bleeding problem.   Skin:  Negative for color change, dry skin, itching, nail changes and suspicious lesions.   Musculoskeletal:  Positive for arthritis, joint pain and stiffness. Negative for myalgias.   Gastrointestinal:  Negative for abdominal pain, jaundice, nausea and vomiting.   Genitourinary:  Negative for dysuria, frequency and hematuria.   Neurological:  Negative for difficulty with concentration, loss of balance, numbness, paresthesias and sensory change.   Psychiatric/Behavioral:  Negative for altered mental status, hallucinations and suicidal ideas. The patient is not nervous/anxious.    Allergic/Immunologic: Negative for environmental allergies and persistent infections.          Objective:      Physical Exam  Vitals reviewed.   Constitutional:       Appearance: She is well-developed.   HENT:      Head: Normocephalic and atraumatic.   Cardiovascular:      Pulses:           Dorsalis pedis pulses are 2+ on the right side and 2+ on the left side.        Posterior tibial pulses are 2+ on the right side and 2+ on the left side.   Pulmonary:      Effort: Pulmonary effort is normal.   Musculoskeletal:         General: Normal range of motion.      Comments: Inspection and palpation of the muscles joints and bones of both lower extremities reveal that muscle strength for the anterior lateral and posterior muscle groups and intrinsic muscle groups of the foot are all 5 over 5 symmetrical.      Painful medial 1st MTPJ exostosis. Lateral deviation of hallux, non trackbound. No pain w/ ROM to 1st or 2nd MTPJs. No First ray hypermobility or sub second MT head callus. No lesser toe deformities or pain.      Skin:     General: Skin is warm and dry.      Capillary Refill: Capillary refill takes 2 to 3 seconds.      Comments: Skin turgor is normal bilaterally.  Skin texture is well hydrated to both lower extremities.  No lesions or rashes or wounds appreciated bilaterally.  Nail plates 1 through 5 bilaterally are within normal limits for length and thickness.  No nail clubbing or incurvation noted.   Neurological:      Mental Status: She is alert and oriented to person, place, and time.      Comments: Sharp dull light touch vibratory proprioceptive sensation are intact bilaterally.  Deep tendon reflexes to patellar and Achilles tendon are symmetrical 2 over 4 bilaterally.  No ankle clonus or Babinski reflexes noted bilaterally.  Coordination is normal to both feet and lower extremities.   Psychiatric:         Behavior: Behavior normal.           Assessment:       Encounter Diagnosis   Name Primary?    Bunion of great toe Yes     Independent visualization of imaging was performed.  Results were reviewed in  detail with patient.       Plan:       Nano was seen today for foot pain.    Diagnoses and all orders for this visit:    Bunion of great toe  -     X-Ray Foot Complete Bilateral; Future      I counseled the patient on her conditions, their implications and medical management.    The nature of the condition, options for management, as well as potential risks and complications were discussed in detail with patient. Patient was amenable to my recommendations and left my office fully informed and will follow up as instructed or sooner if necessary.      I recommended patient be fitted for orthoses.  I explained that orthoses may improve function of the foot, reduce pain, decrease pronation, increase efficiency of muscle function of the foot and ankle and prevent surgery.  Alternative forms of biomechanical control of the foot and ankle were discussed with the patient.      Follow-up to review radiographs and discuss conservative and surgical options and further detail for bunion deformity.

## 2024-02-15 ENCOUNTER — TELEPHONE (OUTPATIENT)
Dept: VASCULAR SURGERY | Facility: CLINIC | Age: 69
End: 2024-02-15
Payer: MEDICARE

## 2024-02-15 NOTE — TELEPHONE ENCOUNTER
Pt called wondering if there was any availability Friday (2/16) or Monday (2/19) to move here procedure up from 3/6. I informed pt that we have one procedure tomorrow that is still not approved and that I would speak to Lauren about this procedure. If procedure falls off, we may be able to move patient's appointment to tomorrow. Let pt know that I would call her back later today with more information.

## 2024-02-16 ENCOUNTER — PROCEDURE VISIT (OUTPATIENT)
Dept: VASCULAR SURGERY | Facility: CLINIC | Age: 69
End: 2024-02-16
Payer: MEDICARE

## 2024-02-16 ENCOUNTER — TELEPHONE (OUTPATIENT)
Dept: ENDOSCOPY | Facility: HOSPITAL | Age: 69
End: 2024-02-16
Payer: MEDICARE

## 2024-02-16 VITALS
HEART RATE: 56 BPM | DIASTOLIC BLOOD PRESSURE: 61 MMHG | SYSTOLIC BLOOD PRESSURE: 126 MMHG | HEIGHT: 67 IN | BODY MASS INDEX: 30.27 KG/M2 | WEIGHT: 192.88 LBS

## 2024-02-16 DIAGNOSIS — G89.18 PAIN ASSOCIATED WITH SURGICAL PROCEDURE: Primary | ICD-10-CM

## 2024-02-16 DIAGNOSIS — I83.813 VARICOSE VEINS OF BILATERAL LOWER EXTREMITIES WITH PAIN: ICD-10-CM

## 2024-02-16 DIAGNOSIS — I83.893 VARICOSE VEINS OF LEG WITH SWELLING, BILATERAL: ICD-10-CM

## 2024-02-16 PROCEDURE — 37766 PHLEB VEINS - EXTREM 20+: CPT | Mod: 50,S$GLB,, | Performed by: SURGERY

## 2024-02-16 RX ORDER — LIDOCAINE HYDROCHLORIDE 10 MG/ML
1 INJECTION INFILTRATION; PERINEURAL
Status: DISCONTINUED | OUTPATIENT
Start: 2024-02-16 | End: 2024-06-03

## 2024-02-16 NOTE — TELEPHONE ENCOUNTER
Spoke to patient to reschedule procedure(s) Flexible Sigmoidoscopy (Flex Sig)       Physician to perform procedure(s) Dr. DOMINICK Linares  Date of Procedure (s) 3/19/24  Arrival Time 1:40 PM  Time of Procedure(s) 2:40 PM   Location of Procedure(s) [address]  Type of Rx Prep sent to patient: Enema  Instructions provided to patient via [facility]    Patient was informed on the following information and verbalized understanding. Screening questionnaire reviewed with patient and complete. If procedure requires anesthesia, a responsible adult needs to be present to accompany the patient home, patient cannot drive after receiving anesthesia. Appointment details are tentative, especially check-in time. Patient will receive a prep-op call 7 days prior to confirm check-in time for procedure. If applicable the patient should contact their pharmacy to verify Rx for procedure prep is ready for pick-up. Patient was advised to call the scheduling department at [phone] if pharmacy states no Rx is available. Patient was advised to call the endoscopy scheduling department if any questions or concerns arise.      [facility] Scheduling Department

## 2024-02-17 NOTE — PROCEDURES
Date:  2/16/24    Pre-Procedure Diagnosis: Varicose veins of the bilateral lower extremity with pain and veins of the bilateral lower extremity with other complications.    Post-Procedure Diagnosis: Same    Procedure: Bilateral lower extremity stab phlebectomy greater 20 stabs    Surgeon: Vel Campos MD RPVI    Assistant: David Rodríguez MS3    Anesthesia: Local 1% Lidocaine with epinephrine    She was brought to the procedure room and placed supine on the procedure table.  A time out was performed.  The skin of the leg was prepped and draped in sterile fashion.  Symptomatic varicosities were marked preop.  I then proceeded to perform the stab phlebectomy of the bilateral lower extremity varicose veins to the anterior lower aspect of the limb.  The skin was anesthetized with tumescent, then a small skin inc was performed with a scalpel.  The spatula was then used to separate the subdermal tissue overlying the varicose vein.  The hook was then used to grasp the vein and the vein was removed with forceps and hemostats.  This was performed to 30 total areas.  Hemastasis was achieved with manual pressure and sterile dressings were applied.  Compression was then placed.    The patient then ambulated to the exam room without difficulty in stable condition.      Complications:  None

## 2024-02-21 NOTE — PROGRESS NOTES
Nano Sosa presented for a  Medicare AWV and comprehensive Health Risk Assessment today. The following components were reviewed and updated:    Medical history  Family History  Social history  Allergies and Current Medications  Health Risk Assessment  Health Maintenance  Care Team         ** See Completed Assessments for Annual Wellness Visit within the encounter summary.**         The following assessments were completed:  Living Situation  CAGE  Depression Screening  Timed Get Up and Go  Whisper Test  Cognitive Function Screening  Nutrition Screening  ADL Screening  PAQ Screening        Vitals:    02/22/24 0909   BP: 108/68   Pulse: (!) 56   SpO2: 97%   Weight: 86.5 kg (190 lb 11.2 oz)     Body mass index is 29.87 kg/m².  Physical Exam  Vitals and nursing note reviewed.   Constitutional:       Appearance: She is well-developed.   HENT:      Head: Normocephalic and atraumatic.      Right Ear: External ear normal.      Left Ear: External ear normal.      Nose: Nose normal.   Eyes:      Pupils: Pupils are equal, round, and reactive to light.   Cardiovascular:      Rate and Rhythm: Normal rate and regular rhythm.      Heart sounds: Normal heart sounds.   Pulmonary:      Effort: Pulmonary effort is normal.      Breath sounds: Normal breath sounds.   Abdominal:      General: Bowel sounds are normal.      Palpations: Abdomen is soft.   Musculoskeletal:         General: Normal range of motion.      Cervical back: Normal range of motion.   Skin:     General: Skin is warm and dry.   Neurological:      Mental Status: She is alert and oriented to person, place, and time.             Diagnoses and health risks identified today and associated recommendations/orders:    1. Encounter for preventive health examination  Health Maintenance updated   Records reviewed   Exam done     2. Encounter for Medicare annual wellness exam  - Ambulatory Referral/Consult to Enhanced Annual Wellness Visit (eAWV)    3. Age-related  osteoporosis without current pathological fracture  Stable and chronic. Encouraged strength training. Followed by PCP.    4. Anemia, unspecified type  Stable and chronic. Continue current medications. Followed by PCP.     5. Environmental allergies  Stable and chronic. Followed by PCP.    6. History of Hashimoto thyroiditis  Stable and chronic. Followed by PCP.    7. Ulcerative colitis without complications, unspecified location  Asymptomatic. Stable and chronic. Followed by PCP and GI.     Counseling and Referral of Other Preventative  (Italic type indicates deductible and co-insurance are waived)    Patient Name: Nano Sosa  Today's Date: 2/22/2024    Health Maintenance         Date Due Completion Date    RSV Vaccine (Age 60+ and Pregnant patients) (1 - 1-dose 60+ series) Never done ---    Mammogram 01/02/2025 1/2/2024    DEXA Scan 01/17/2026 1/17/2024    Hemoglobin A1c (Diabetic Prevention Screening) 08/02/2026 8/2/2023    Colorectal Cancer Screening 02/13/2028 2/13/2023    Lipid Panel 08/02/2028 8/2/2023          No orders of the defined types were placed in this encounter.    Provided Nano with a 5-10 year written screening schedule and personal prevention plan. Recommendations were developed using the USPSTF age appropriate recommendations. Education, counseling, and referrals were provided as needed. After Visit Summary printed and given to patient which includes a list of additional screenings\tests needed.    Follow up in about 1 year (around 2/22/2025) for medicare annual wellness visit.    Madiha Garcia NP    Review for Opioid Screening: Pt does not have Rx for Opioids     Review for Substance Use Disorders: Patient does not use substance      I offered to discuss advanced care planning, including how to pick a person who would make decisions for you if you were unable to make them for yourself, called a health care power of , and what kind of decisions you might make such as use of life  sustaining treatments such as ventilators and tube feeding when faced with a life limiting illness recorded on a living will that they will need to know. (How you want to be cared for as you near the end of your natural life)     X Patient is interested in learning more about how to make advanced directives.  I provided them paperwork and offered to discuss this with them.

## 2024-02-22 ENCOUNTER — TELEPHONE (OUTPATIENT)
Dept: PODIATRY | Facility: CLINIC | Age: 69
End: 2024-02-22
Payer: MEDICARE

## 2024-02-22 ENCOUNTER — PATIENT MESSAGE (OUTPATIENT)
Dept: PODIATRY | Facility: CLINIC | Age: 69
End: 2024-02-22
Payer: MEDICARE

## 2024-02-22 ENCOUNTER — OFFICE VISIT (OUTPATIENT)
Dept: INTERNAL MEDICINE | Facility: CLINIC | Age: 69
End: 2024-02-22
Payer: MEDICARE

## 2024-02-22 ENCOUNTER — HOSPITAL ENCOUNTER (OUTPATIENT)
Dept: RADIOLOGY | Facility: HOSPITAL | Age: 69
Discharge: HOME | End: 2024-02-22
Attending: PODIATRIST
Payer: MEDICARE

## 2024-02-22 VITALS
DIASTOLIC BLOOD PRESSURE: 68 MMHG | HEART RATE: 56 BPM | OXYGEN SATURATION: 97 % | WEIGHT: 190.69 LBS | BODY MASS INDEX: 29.87 KG/M2 | SYSTOLIC BLOOD PRESSURE: 108 MMHG

## 2024-02-22 DIAGNOSIS — Z86.39 HISTORY OF HASHIMOTO THYROIDITIS: ICD-10-CM

## 2024-02-22 DIAGNOSIS — Z91.09 ENVIRONMENTAL ALLERGIES: ICD-10-CM

## 2024-02-22 DIAGNOSIS — M81.0 AGE-RELATED OSTEOPOROSIS WITHOUT CURRENT PATHOLOGICAL FRACTURE: ICD-10-CM

## 2024-02-22 DIAGNOSIS — K51.90 ULCERATIVE COLITIS WITHOUT COMPLICATIONS, UNSPECIFIED LOCATION: ICD-10-CM

## 2024-02-22 DIAGNOSIS — M21.619 BUNION OF GREAT TOE: ICD-10-CM

## 2024-02-22 DIAGNOSIS — Z00.00 ENCOUNTER FOR MEDICARE ANNUAL WELLNESS EXAM: ICD-10-CM

## 2024-02-22 DIAGNOSIS — M21.619 BUNION OF GREAT TOE: Primary | ICD-10-CM

## 2024-02-22 DIAGNOSIS — Z00.00 ENCOUNTER FOR PREVENTIVE HEALTH EXAMINATION: Primary | ICD-10-CM

## 2024-02-22 DIAGNOSIS — D64.9 ANEMIA, UNSPECIFIED TYPE: ICD-10-CM

## 2024-02-22 PROCEDURE — G0439 PPPS, SUBSEQ VISIT: HCPCS | Mod: S$GLB,,, | Performed by: NURSE PRACTITIONER

## 2024-02-22 PROCEDURE — 73630 X-RAY EXAM OF FOOT: CPT | Mod: TC,50

## 2024-02-22 PROCEDURE — 1170F FXNL STATUS ASSESSED: CPT | Mod: CPTII,S$GLB,, | Performed by: NURSE PRACTITIONER

## 2024-02-22 PROCEDURE — 73630 X-RAY EXAM OF FOOT: CPT | Mod: 26,50,, | Performed by: STUDENT IN AN ORGANIZED HEALTH CARE EDUCATION/TRAINING PROGRAM

## 2024-02-22 PROCEDURE — 1100F PTFALLS ASSESS-DOCD GE2>/YR: CPT | Mod: CPTII,S$GLB,, | Performed by: NURSE PRACTITIONER

## 2024-02-22 PROCEDURE — 3078F DIAST BP <80 MM HG: CPT | Mod: CPTII,S$GLB,, | Performed by: NURSE PRACTITIONER

## 2024-02-22 PROCEDURE — 3288F FALL RISK ASSESSMENT DOCD: CPT | Mod: CPTII,S$GLB,, | Performed by: NURSE PRACTITIONER

## 2024-02-22 PROCEDURE — 3074F SYST BP LT 130 MM HG: CPT | Mod: CPTII,S$GLB,, | Performed by: NURSE PRACTITIONER

## 2024-02-22 PROCEDURE — 99999 PR PBB SHADOW E&M-EST. PATIENT-LVL III: CPT | Mod: PBBFAC,,, | Performed by: NURSE PRACTITIONER

## 2024-02-22 RX ORDER — ALPRAZOLAM 0.5 MG/1
0.5 TABLET ORAL
COMMUNITY
Start: 2024-02-16 | End: 2024-05-23

## 2024-02-22 NOTE — PATIENT INSTRUCTIONS
Counseling and Referral of Other Preventative  (Italic type indicates deductible and co-insurance are waived)    Patient Name: Nano Sosa  Today's Date: 2/22/2024    Health Maintenance       Date Due Completion Date    RSV Vaccine (Age 60+ and Pregnant patients) (1 - 1-dose 60+ series) Never done ---    Mammogram 01/02/2025 1/2/2024    DEXA Scan 01/17/2026 1/17/2024    Hemoglobin A1c (Diabetic Prevention Screening) 08/02/2026 8/2/2023    Colorectal Cancer Screening 02/13/2028 2/13/2023    Lipid Panel 08/02/2028 8/2/2023        No orders of the defined types were placed in this encounter.    The following information is provided to all patients.  This information is to help you find resources for any of the problems found today that may be affecting your health:                  Living healthy guide: www.[facility].[place].gov      Understanding Diabetes: www.diabetes.org      Eating healthy: www.cdc.gov/healthyweight      [facility] home safety checklist: www.cdc.gov/steadi/patient.html      Agency on Aging: www.goea.[place].[facility]      Alcoholics anonymous (AA): www.aa.org      Physical Activity: www.yamilka.nih.gov/st4oqaz      Tobacco use: www.quitwithusla.org

## 2024-02-22 NOTE — TELEPHONE ENCOUNTER
Spoke to pt and discussed 3/15 surgery date with Dr. Vaughn. Also advised pt to contact their PCP to schedule an appointment to be cleared for surgery. Then advised pt that someone will call them the day before surgery between 3p-5p with the surgery arrival time and instructions. Pt verbalized understanding and call ended.

## 2024-02-28 ENCOUNTER — OFFICE VISIT (OUTPATIENT)
Dept: INTERNAL MEDICINE | Facility: CLINIC | Age: 69
End: 2024-02-28
Payer: MEDICARE

## 2024-02-28 ENCOUNTER — TELEPHONE (OUTPATIENT)
Dept: PODIATRY | Facility: CLINIC | Age: 69
End: 2024-02-28
Payer: MEDICARE

## 2024-02-28 VITALS
HEART RATE: 62 BPM | SYSTOLIC BLOOD PRESSURE: 115 MMHG | BODY MASS INDEX: 30.13 KG/M2 | WEIGHT: 192 LBS | DIASTOLIC BLOOD PRESSURE: 72 MMHG | OXYGEN SATURATION: 99 % | HEIGHT: 67 IN

## 2024-02-28 DIAGNOSIS — Z01.810 PREOP CARDIOVASCULAR EXAM: Primary | ICD-10-CM

## 2024-02-28 DIAGNOSIS — M81.0 OSTEOPOROSIS, UNSPECIFIED OSTEOPOROSIS TYPE, UNSPECIFIED PATHOLOGICAL FRACTURE PRESENCE: ICD-10-CM

## 2024-02-28 DIAGNOSIS — D64.9 ANEMIA, UNSPECIFIED TYPE: ICD-10-CM

## 2024-02-28 PROCEDURE — 99999 PR PBB SHADOW E&M-EST. PATIENT-LVL III: CPT | Mod: PBBFAC,,, | Performed by: INTERNAL MEDICINE

## 2024-02-28 PROCEDURE — 3074F SYST BP LT 130 MM HG: CPT | Mod: CPTII,S$GLB,, | Performed by: INTERNAL MEDICINE

## 2024-02-28 PROCEDURE — 1125F AMNT PAIN NOTED PAIN PRSNT: CPT | Mod: CPTII,S$GLB,, | Performed by: INTERNAL MEDICINE

## 2024-02-28 PROCEDURE — 3288F FALL RISK ASSESSMENT DOCD: CPT | Mod: CPTII,S$GLB,, | Performed by: INTERNAL MEDICINE

## 2024-02-28 PROCEDURE — 3008F BODY MASS INDEX DOCD: CPT | Mod: CPTII,S$GLB,, | Performed by: INTERNAL MEDICINE

## 2024-02-28 PROCEDURE — 3078F DIAST BP <80 MM HG: CPT | Mod: CPTII,S$GLB,, | Performed by: INTERNAL MEDICINE

## 2024-02-28 PROCEDURE — 99214 OFFICE O/P EST MOD 30 MIN: CPT | Mod: S$GLB,,, | Performed by: INTERNAL MEDICINE

## 2024-02-28 PROCEDURE — 1101F PT FALLS ASSESS-DOCD LE1/YR: CPT | Mod: CPTII,S$GLB,, | Performed by: INTERNAL MEDICINE

## 2024-02-28 NOTE — H&P (VIEW-ONLY)
"Subjective:       Patient ID: Nano Sosa is a 68 y.o. female.    Chief Complaint: Pre-op Exam    HPI    Presents for pre op evaluation for upcoming bunionectomy     Surgical Risk Assessment   Active cardiac issues:  Active decompensated heart failure? No   Unstable angina?  No   Significant uncontrolled arrhythmias? No   Severe valvular heart disease-Aortic or Mitral Stenosis? No   Recent MI or coronary revascularization < 30 days? No     Cardiac Risk Factors  History of CAD/ischemic heart disease? No   History of cerebrovascular disease? No   History of compensated heart failure? No   Type 2 diabetes requiring insulin? No   Serum Creatinine > 2? No   Total cardiac risk factors 0     Functional mets  > 4        PMHx:  Osteoporosis not currently on medications. has met with endocrinology in the past. Was waiting to complete dental work before starting medication and then had patella fracture.     Varicose veins s/p stab phlebectomy in 11/2023     Hx of hashimoto's: Well controlled  Not on thyroid medication    HX of ulcerative colitis: Colonoscopy 11/2014 c/w localized mild inflammation with edema and granularity in the rectum with 3 cm rectal polyp 10 cm proximal to anus that was biopsied. Saw GI in 2023 who recommended the following:  "Given her high risk of colon cancer I recommended flex sig yearly and colonoscopy q 3-5 years.      Health Maintenance:  Colon Cancer Screening: Colonoscopy in February 2023 with polyps removed. Has scope scheduled for March 2024   Mammogram: Due in November. Order today   Hep C: negative 2023   Lipids: normal 2023   Vaccines:  up to date       Review of Systems   Constitutional:  Negative for activity change, appetite change and chills.   HENT:  Negative for ear pain, sinus pressure/congestion and sneezing.    Respiratory:  Negative for cough and shortness of breath.    Cardiovascular:  Negative for chest pain, palpitations and leg swelling.   Gastrointestinal:  Negative for " abdominal distention, abdominal pain, constipation, diarrhea, nausea and vomiting.   Genitourinary:  Negative for dysuria and hematuria.   Musculoskeletal:  Negative for arthralgias, back pain and myalgias.   Neurological:  Negative for dizziness and headaches.   Psychiatric/Behavioral:  Negative for agitation. The patient is not nervous/anxious.            Past Medical History:   Diagnosis Date    General anesthetics causing adverse effect in therapeutic use     wakes up hyperventilating/chills    Goiter     Hashimoto's disease     Migraine     Osteopenia     Rectal mass     Ulcerative colitis     Urinary tract infection     Vitamin D deficiency      Past Surgical History:   Procedure Laterality Date    BUNIONECTOMY      right    colon sx      COLONOSCOPY N/A 12/19/2016    Procedure: COLONOSCOPY;  Surgeon: Alli Peña MD;  [facility]  Service: Endoscopy;  Laterality: N/A;    COLONOSCOPY N/A 1/7/2019    Procedure: COLONOSCOPY;  Surgeon: Vel Self MD;  Location: [facility] ENDO ([facility]);  Service: Endoscopy;  Laterality: N/A;  Pt requests Dr. Peña, Pt ok with Dr. Self    COLONOSCOPY N/A 2/13/2023    Procedure: COLONOSCOPY;  Surgeon: Srinivas Linares MD;  [facility]  Service: Endoscopy;  Laterality: N/A;  instructions via portal -   pt request Sutab  chgd from [place] to Devendra-SHYAM--1/17  Precall complete- [place]    f.e.s.s.  12/5/2014    FACIAL COSMETIC SURGERY      TOTAL ABDOMINAL HYSTERECTOMY  1994    [facility]      Patient Active Problem List   Diagnosis    Thyroid nodule    History of Hashimoto thyroiditis    Environmental allergies    History of colon polyps    Mild vitamin D deficiency    Rectal mass    Pulmonary nodules    Colon polyp    Other osteoporosis without current pathological fracture        Objective:      Physical Exam  Vitals and nursing note reviewed.   Constitutional:       Appearance: She is well-developed.   Eyes:      Pupils: Pupils are equal,  round, and reactive to light.   Cardiovascular:      Rate and Rhythm: Normal rate and regular rhythm.      Heart sounds: Normal heart sounds.   Pulmonary:      Effort: Pulmonary effort is normal.      Breath sounds: Normal breath sounds.   Abdominal:      General: Bowel sounds are normal.      Palpations: Abdomen is soft.   Musculoskeletal:         General: Normal range of motion.      Cervical back: Normal range of motion.   Skin:     General: Skin is warm and dry.   Neurological:      Mental Status: She is alert and oriented to person, place, and time.         Assessment:       Problem List Items Addressed This Visit    None  Visit Diagnoses       Preop cardiovascular exam    -  Primary    Relevant Orders    COMPREHENSIVE METABOLIC PANEL (Completed)    PROTIME-INR (Completed)    EKG 12-lead (Completed)    Anemia, unspecified type        Relevant Orders    CBC W/ AUTO DIFFERENTIAL (Completed)    IRON AND TIBC (Completed)    Osteoporosis, unspecified osteoporosis type, unspecified pathological fracture presence        Relevant Orders    Ambulatory referral/consult to Endocrinology            Plan:         Nano was seen today for pre-op exam.    Diagnoses and all orders for this visit:    Preop cardiovascular exam    Assessment/Plan:   Cardiovascular Risk Assessment:  Non-emergent surgery.  No active cardiac problems   Surgery is low risk  Functional Status: able to climb a flight of stairs (> 4 METS)    RCRI Calculator Class and Risk percentage:      0.4 %      Recommendation:  1. Proceed to Surgery.      Anemia, unspecified type  -     CBC W/ AUTO DIFFERENTIAL; Future  -     IRON AND TIBC; Future    Osteoporosis, unspecified osteoporosis type, unspecified pathological fracture presence  -     Ambulatory referral/consult to Endocrinology; Future                 Lauren Fountain MD   Internal Medicine   Primary Care

## 2024-02-28 NOTE — PROGRESS NOTES
"Subjective:       Patient ID: Nano Sosa is a 68 y.o. female.    Chief Complaint: Pre-op Exam    HPI    Presents for pre op evaluation for upcoming bunionectomy     Surgical Risk Assessment   Active cardiac issues:  Active decompensated heart failure? No   Unstable angina?  No   Significant uncontrolled arrhythmias? No   Severe valvular heart disease-Aortic or Mitral Stenosis? No   Recent MI or coronary revascularization < 30 days? No     Cardiac Risk Factors  History of CAD/ischemic heart disease? No   History of cerebrovascular disease? No   History of compensated heart failure? No   Type 2 diabetes requiring insulin? No   Serum Creatinine > 2? No   Total cardiac risk factors 0     Functional mets  > 4        PMHx:  Osteoporosis not currently on medications. has met with endocrinology in the past. Was waiting to complete dental work before starting medication and then had patella fracture.     Varicose veins s/p stab phlebectomy in 11/2023     Hx of hashimoto's: Well controlled  Not on thyroid medication    HX of ulcerative colitis: Colonoscopy 11/2014 c/w localized mild inflammation with edema and granularity in the rectum with 3 cm rectal polyp 10 cm proximal to anus that was biopsied. Saw GI in 2023 who recommended the following:  "Given her high risk of colon cancer I recommended flex sig yearly and colonoscopy q 3-5 years.      Health Maintenance:  Colon Cancer Screening: Colonoscopy in February 2023 with polyps removed. Has scope scheduled for March 2024   Mammogram: Due in November. Order today   Hep C: negative 2023   Lipids: normal 2023   Vaccines:  up to date       Review of Systems   Constitutional:  Negative for activity change, appetite change and chills.   HENT:  Negative for ear pain, sinus pressure/congestion and sneezing.    Respiratory:  Negative for cough and shortness of breath.    Cardiovascular:  Negative for chest pain, palpitations and leg swelling.   Gastrointestinal:  Negative for " abdominal distention, abdominal pain, constipation, diarrhea, nausea and vomiting.   Genitourinary:  Negative for dysuria and hematuria.   Musculoskeletal:  Negative for arthralgias, back pain and myalgias.   Neurological:  Negative for dizziness and headaches.   Psychiatric/Behavioral:  Negative for agitation. The patient is not nervous/anxious.            Past Medical History:   Diagnosis Date    General anesthetics causing adverse effect in therapeutic use     wakes up hyperventilating/chills    Goiter     Hashimoto's disease     Migraine     Osteopenia     Rectal mass     Ulcerative colitis     Urinary tract infection     Vitamin D deficiency      Past Surgical History:   Procedure Laterality Date    BUNIONECTOMY      right    colon sx      COLONOSCOPY N/A 12/19/2016    Procedure: COLONOSCOPY;  Surgeon: Alli Peña MD;  [facility]  Service: Endoscopy;  Laterality: N/A;    COLONOSCOPY N/A 1/7/2019    Procedure: COLONOSCOPY;  Surgeon: Vel Self MD;  Location: [facility] ([facility]);  Service: Endoscopy;  Laterality: N/A;  Pt requests Dr. Peña, Pt ok with Dr. Self    COLONOSCOPY N/A 2/13/2023    Procedure: COLONOSCOPY;  Surgeon: Srinivas Linares MD;  [facility]  Service: Endoscopy;  Laterality: N/A;  instructions via portal -   pt request Sutab  chgd from [place] to Devendra-SHYAM--1/17  Precall complete- [place]    f.e.s.s.  12/5/2014    FACIAL COSMETIC SURGERY      TOTAL ABDOMINAL HYSTERECTOMY  1994    [facility]      Patient Active Problem List   Diagnosis    Thyroid nodule    History of Hashimoto thyroiditis    Environmental allergies    History of colon polyps    Mild vitamin D deficiency    Rectal mass    Pulmonary nodules    Colon polyp    Other osteoporosis without current pathological fracture        Objective:      Physical Exam  Vitals and nursing note reviewed.   Constitutional:       Appearance: She is well-developed.   Eyes:      Pupils: Pupils are equal,  round, and reactive to light.   Cardiovascular:      Rate and Rhythm: Normal rate and regular rhythm.      Heart sounds: Normal heart sounds.   Pulmonary:      Effort: Pulmonary effort is normal.      Breath sounds: Normal breath sounds.   Abdominal:      General: Bowel sounds are normal.      Palpations: Abdomen is soft.   Musculoskeletal:         General: Normal range of motion.      Cervical back: Normal range of motion.   Skin:     General: Skin is warm and dry.   Neurological:      Mental Status: She is alert and oriented to person, place, and time.         Assessment:       Problem List Items Addressed This Visit    None  Visit Diagnoses       Preop cardiovascular exam    -  Primary    Relevant Orders    COMPREHENSIVE METABOLIC PANEL (Completed)    PROTIME-INR (Completed)    EKG 12-lead (Completed)    Anemia, unspecified type        Relevant Orders    CBC W/ AUTO DIFFERENTIAL (Completed)    IRON AND TIBC (Completed)    Osteoporosis, unspecified osteoporosis type, unspecified pathological fracture presence        Relevant Orders    Ambulatory referral/consult to Endocrinology            Plan:         Nano was seen today for pre-op exam.    Diagnoses and all orders for this visit:    Preop cardiovascular exam    Assessment/Plan:   Cardiovascular Risk Assessment:  Non-emergent surgery.  No active cardiac problems   Surgery is low risk  Functional Status: able to climb a flight of stairs (> 4 METS)    RCRI Calculator Class and Risk percentage:      0.4 %      Recommendation:  1. Proceed to Surgery.      Anemia, unspecified type  -     CBC W/ AUTO DIFFERENTIAL; Future  -     IRON AND TIBC; Future    Osteoporosis, unspecified osteoporosis type, unspecified pathological fracture presence  -     Ambulatory referral/consult to Endocrinology; Future                 Lauren Fountain MD   Internal Medicine   Primary Care

## 2024-02-29 ENCOUNTER — PATIENT MESSAGE (OUTPATIENT)
Dept: INTERNAL MEDICINE | Facility: CLINIC | Age: 69
End: 2024-02-29
Payer: MEDICARE

## 2024-02-29 ENCOUNTER — HOSPITAL ENCOUNTER (OUTPATIENT)
Dept: CARDIOLOGY | Facility: CLINIC | Age: 69
Discharge: HOME | End: 2024-02-29
Payer: MEDICARE

## 2024-02-29 ENCOUNTER — LAB VISIT (OUTPATIENT)
Dept: LAB | Facility: HOSPITAL | Age: 69
End: 2024-02-29
Payer: MEDICARE

## 2024-02-29 DIAGNOSIS — D64.9 ANEMIA, UNSPECIFIED TYPE: ICD-10-CM

## 2024-02-29 DIAGNOSIS — Z01.810 PREOP CARDIOVASCULAR EXAM: ICD-10-CM

## 2024-02-29 LAB
ALBUMIN SERPL BCP-MCNC: 4 G/DL (ref 3.5–5.2)
ALP SERPL-CCNC: 62 U/L (ref 55–135)
ALT SERPL W/O P-5'-P-CCNC: 16 U/L (ref 10–44)
ANION GAP SERPL CALC-SCNC: 11 MMOL/L (ref 8–16)
AST SERPL-CCNC: 17 U/L (ref 10–40)
BASOPHILS # BLD AUTO: 0.03 K/UL (ref 0–0.2)
BASOPHILS NFR BLD: 0.4 % (ref 0–1.9)
BILIRUB SERPL-MCNC: 0.8 MG/DL (ref 0.1–1)
BUN SERPL-MCNC: 17 MG/DL (ref 8–23)
CALCIUM SERPL-MCNC: 10 MG/DL (ref 8.7–10.5)
CHLORIDE SERPL-SCNC: 105 MMOL/L (ref 95–110)
CO2 SERPL-SCNC: 26 MMOL/L (ref 23–29)
CREAT SERPL-MCNC: 0.7 MG/DL (ref 0.5–1.4)
DIFFERENTIAL METHOD BLD: NORMAL
EOSINOPHIL # BLD AUTO: 0.1 K/UL (ref 0–0.5)
EOSINOPHIL NFR BLD: 1.2 % (ref 0–8)
ERYTHROCYTE [DISTWIDTH] IN BLOOD BY AUTOMATED COUNT: 12.5 % (ref 11.5–14.5)
EST. GFR  (NO RACE VARIABLE): >60 ML/MIN/1.73 M^2
GLUCOSE SERPL-MCNC: 85 MG/DL (ref 70–110)
HCT VFR BLD AUTO: 39.2 % (ref 37–48.5)
HGB BLD-MCNC: 12.6 G/DL (ref 12–16)
IMM GRANULOCYTES # BLD AUTO: 0.01 K/UL (ref 0–0.04)
IMM GRANULOCYTES NFR BLD AUTO: 0.1 % (ref 0–0.5)
INR PPP: 1 (ref 0.8–1.2)
IRON SERPL-MCNC: 65 UG/DL (ref 30–160)
LYMPHOCYTES # BLD AUTO: 2 K/UL (ref 1–4.8)
LYMPHOCYTES NFR BLD: 30 % (ref 18–48)
MCH RBC QN AUTO: 31 PG (ref 27–31)
MCHC RBC AUTO-ENTMCNC: 32.1 G/DL (ref 32–36)
MCV RBC AUTO: 96 FL (ref 82–98)
MONOCYTES # BLD AUTO: 0.5 K/UL (ref 0.3–1)
MONOCYTES NFR BLD: 6.7 % (ref 4–15)
NEUTROPHILS # BLD AUTO: 4.2 K/UL (ref 1.8–7.7)
NEUTROPHILS NFR BLD: 61.6 % (ref 38–73)
NRBC BLD-RTO: 0 /100 WBC
OHS QRS DURATION: 90 MS
OHS QTC CALCULATION: 428 MS
PLATELET # BLD AUTO: 221 K/UL (ref 150–450)
PMV BLD AUTO: 10.9 FL (ref 9.2–12.9)
POTASSIUM SERPL-SCNC: 4.1 MMOL/L (ref 3.5–5.1)
PROT SERPL-MCNC: 7.2 G/DL (ref 6–8.4)
PROTHROMBIN TIME: 10.8 SEC (ref 9–12.5)
RBC # BLD AUTO: 4.07 M/UL (ref 4–5.4)
SATURATED IRON: 15 % (ref 20–50)
SODIUM SERPL-SCNC: 142 MMOL/L (ref 136–145)
TOTAL IRON BINDING CAPACITY: 434 UG/DL (ref 250–450)
TRANSFERRIN SERPL-MCNC: 293 MG/DL (ref 200–375)
WBC # BLD AUTO: 6.74 K/UL (ref 3.9–12.7)

## 2024-02-29 PROCEDURE — 85610 PROTHROMBIN TIME: CPT | Performed by: INTERNAL MEDICINE

## 2024-02-29 PROCEDURE — 93010 ELECTROCARDIOGRAM REPORT: CPT | Mod: S$GLB,,, | Performed by: INTERNAL MEDICINE

## 2024-02-29 PROCEDURE — 83540 ASSAY OF IRON: CPT | Performed by: INTERNAL MEDICINE

## 2024-02-29 PROCEDURE — 80053 COMPREHEN METABOLIC PANEL: CPT | Performed by: INTERNAL MEDICINE

## 2024-02-29 PROCEDURE — 36415 COLL VENOUS BLD VENIPUNCTURE: CPT | Performed by: INTERNAL MEDICINE

## 2024-02-29 PROCEDURE — 93005 ELECTROCARDIOGRAM TRACING: CPT | Mod: S$GLB,,, | Performed by: INTERNAL MEDICINE

## 2024-02-29 PROCEDURE — 85025 COMPLETE CBC W/AUTO DIFF WBC: CPT | Performed by: INTERNAL MEDICINE

## 2024-03-01 ENCOUNTER — PATIENT MESSAGE (OUTPATIENT)
Dept: INTERNAL MEDICINE | Facility: CLINIC | Age: 69
End: 2024-03-01
Payer: MEDICARE

## 2024-03-13 ENCOUNTER — TELEPHONE (OUTPATIENT)
Dept: ENDOSCOPY | Facility: HOSPITAL | Age: 69
End: 2024-03-13
Payer: MEDICARE

## 2024-03-13 NOTE — TELEPHONE ENCOUNTER
Patient called to confirm Flexible Sigmoidoscopy on 3/19/24. Reviewed instructions with pt, pt verbalized understanding.

## 2024-03-14 ENCOUNTER — ANESTHESIA EVENT (OUTPATIENT)
Dept: SURGERY | Facility: HOSPITAL | Age: 69
End: 2024-03-14
Payer: MEDICARE

## 2024-03-14 ENCOUNTER — TELEPHONE (OUTPATIENT)
Dept: PODIATRY | Facility: CLINIC | Age: 69
End: 2024-03-14
Payer: MEDICARE

## 2024-03-14 DIAGNOSIS — M21.619 BUNION: Primary | ICD-10-CM

## 2024-03-14 NOTE — TELEPHONE ENCOUNTER
Spoke to pt and relayed their 5:15am  arrival time for [place] [facility] . Also informed pt to not eat or drink after midnight including gum, hard candy or mints. Also that the pt must have a ride home from surgery, they will not be allowed to drive after anesthesia. Pt verbalized understanding and call ended.

## 2024-03-14 NOTE — PRE-PROCEDURE INSTRUCTIONS
The following was discussed with pt via phone and sent to pt portal. Pt verbalized understanding. Pt to be accompanied by Liz Huffman.    Dear Nano ,    You are scheduled for a procedure with Dr. Vaughn on 3/15/2024. Your scheduled arrival time is 5:15am.  This arrival time is roughly 2 hours before your anticipated procedure time to allow sufficient time for pre-op.  Please wear comfortable clothes.  This procedure will take place at the [facility] at the corner of [address].  [facility] [address] [facility] [place] [facility] to [address] [facility].  The address is:    [address]  [place] [address]    After entering the building, you will proceed to the second floor where you can check in with registration. You should take any medications that you routinely take for blood pressure (other than those listed below), heart medications, thyroid, cholesterol, etc.     If you wear contact lenses, please wear glasses to your procedure.    Your fasting instructions are as follow:  Nothing to eat after midnight the evening before your surgery. You may drink clear liquids up until 2 hours prior to your arrival time. You MUST have a responsible adult to bring you home.      The evening before and morning of your procedure, please hold the following medications:  -Aspirin and Aspirin-containing products (Goody's powder, Excedrin)  -NSAIDs (Advil, Ibuprofen, Aleve, Diclofenac)  -Vitamins/Supplements  -Herbal remedies/Teas  -Stimulants (Adderall, Vyvanse, Adipex)  -Diabetic medication (Please bring with you day of procedure)  -VITAMINS/SUPPLEMENTS      -May take Tylenol      The evening before and morning of your procedure, take a shower using antibacterial soap (ex: Hibiclens or Dial antibacterial soap). DO NOT apply deodorant, lotion, cologne, or anything else to the skin. Wear loose, comfortable fitting clothing. Do not wear jewelry or bring any valuables with you. If you wear  dentures or contacts, please bring your case with you or leave them at home. Use and bring any inhalers that you may have.    If you have any procedure-specific questions, please call your surgeon's office. Any other questions, don't hesitate to call at [phone].    Thanks,  SINDY Yoon  Pre-Admit Testing  Anesthesia Dept [facility]

## 2024-03-15 ENCOUNTER — ANESTHESIA (OUTPATIENT)
Dept: SURGERY | Facility: HOSPITAL | Age: 69
End: 2024-03-15
Payer: MEDICARE

## 2024-03-15 ENCOUNTER — HOSPITAL ENCOUNTER (OUTPATIENT)
Facility: HOSPITAL | Age: 69
Discharge: HOME | End: 2024-03-15
Attending: PODIATRIST | Admitting: PODIATRIST
Payer: MEDICARE

## 2024-03-15 ENCOUNTER — HOSPITAL ENCOUNTER (OUTPATIENT)
Dept: RADIOLOGY | Facility: HOSPITAL | Age: 69
Discharge: HOME | End: 2024-03-15
Attending: PODIATRIST | Admitting: PODIATRIST
Payer: MEDICARE

## 2024-03-15 VITALS
HEIGHT: 67 IN | WEIGHT: 186 LBS | DIASTOLIC BLOOD PRESSURE: 57 MMHG | OXYGEN SATURATION: 95 % | BODY MASS INDEX: 29.19 KG/M2 | HEART RATE: 54 BPM | RESPIRATION RATE: 15 BRPM | SYSTOLIC BLOOD PRESSURE: 119 MMHG | TEMPERATURE: 98 F

## 2024-03-15 DIAGNOSIS — M21.619 BUNION: ICD-10-CM

## 2024-03-15 DIAGNOSIS — M20.10: ICD-10-CM

## 2024-03-15 PROCEDURE — D9220A PRA ANESTHESIA: Mod: ,,, | Performed by: NURSE ANESTHETIST, CERTIFIED REGISTERED

## 2024-03-15 PROCEDURE — 36000706: Performed by: PODIATRIST

## 2024-03-15 PROCEDURE — 99900035 HC TECH TIME PER 15 MIN (STAT)

## 2024-03-15 PROCEDURE — 63600175 PHARM REV CODE 636 W HCPCS: Performed by: NURSE ANESTHETIST, CERTIFIED REGISTERED

## 2024-03-15 PROCEDURE — 25000003 PHARM REV CODE 250: Performed by: NURSE ANESTHETIST, CERTIFIED REGISTERED

## 2024-03-15 PROCEDURE — 63600175 PHARM REV CODE 636 W HCPCS: Mod: JG | Performed by: PODIATRIST

## 2024-03-15 PROCEDURE — 25000003 PHARM REV CODE 250: Performed by: PODIATRIST

## 2024-03-15 PROCEDURE — 63600175 PHARM REV CODE 636 W HCPCS: Performed by: ANESTHESIOLOGY

## 2024-03-15 PROCEDURE — 20680 REMOVAL OF IMPLANT DEEP: CPT | Mod: 51,,, | Performed by: PODIATRIST

## 2024-03-15 PROCEDURE — 37000008 HC ANESTHESIA 1ST 15 MINUTES: Performed by: PODIATRIST

## 2024-03-15 PROCEDURE — 36000707: Performed by: PODIATRIST

## 2024-03-15 PROCEDURE — 71000033 HC RECOVERY, INTIAL HOUR: Performed by: PODIATRIST

## 2024-03-15 PROCEDURE — 71000015 HC POSTOP RECOV 1ST HR: Performed by: PODIATRIST

## 2024-03-15 PROCEDURE — 28299 COR HLX VLGS DOUBLE OSTEOT: CPT | Mod: TA,,, | Performed by: PODIATRIST

## 2024-03-15 PROCEDURE — 94761 N-INVAS EAR/PLS OXIMETRY MLT: CPT

## 2024-03-15 PROCEDURE — 27201423 OPTIME MED/SURG SUP & DEVICES STERILE SUPPLY: Performed by: PODIATRIST

## 2024-03-15 PROCEDURE — 37000009 HC ANESTHESIA EA ADD 15 MINS: Performed by: PODIATRIST

## 2024-03-15 PROCEDURE — C1713 ANCHOR/SCREW BN/BN,TIS/BN: HCPCS | Performed by: PODIATRIST

## 2024-03-15 DEVICE — IMPLANTABLE DEVICE: Type: IMPLANTABLE DEVICE | Site: FOOT | Status: FUNCTIONAL

## 2024-03-15 RX ORDER — HYDROMORPHONE HYDROCHLORIDE 1 MG/ML
0.5 INJECTION, SOLUTION INTRAMUSCULAR; INTRAVENOUS; SUBCUTANEOUS EVERY 5 MIN PRN
Status: DISCONTINUED | OUTPATIENT
Start: 2024-03-15 | End: 2024-03-15 | Stop reason: HOSPADM

## 2024-03-15 RX ORDER — SODIUM CHLORIDE 9 MG/ML
INJECTION, SOLUTION INTRAVENOUS CONTINUOUS
Status: DISCONTINUED | OUTPATIENT
Start: 2024-03-15 | End: 2024-03-15 | Stop reason: HOSPADM

## 2024-03-15 RX ORDER — LIDOCAINE HYDROCHLORIDE 10 MG/ML
INJECTION, SOLUTION EPIDURAL; INFILTRATION; INTRACAUDAL; PERINEURAL
Status: DISCONTINUED | OUTPATIENT
Start: 2024-03-15 | End: 2024-03-15 | Stop reason: HOSPADM

## 2024-03-15 RX ORDER — KETAMINE HCL IN 0.9 % NACL 50 MG/5 ML
SYRINGE (ML) INTRAVENOUS
Status: DISCONTINUED | OUTPATIENT
Start: 2024-03-15 | End: 2024-03-15

## 2024-03-15 RX ORDER — BUPIVACAINE HYDROCHLORIDE 5 MG/ML
INJECTION, SOLUTION PERINEURAL
Status: DISCONTINUED | OUTPATIENT
Start: 2024-03-15 | End: 2024-03-15 | Stop reason: HOSPADM

## 2024-03-15 RX ORDER — OXYCODONE AND ACETAMINOPHEN 7.5; 325 MG/1; MG/1
1 TABLET ORAL EVERY 6 HOURS PRN
Qty: 30 TABLET | Refills: 0 | Status: SHIPPED | OUTPATIENT
Start: 2024-03-15 | End: 2024-05-23

## 2024-03-15 RX ORDER — MIDAZOLAM HYDROCHLORIDE 1 MG/ML
INJECTION, SOLUTION INTRAMUSCULAR; INTRAVENOUS
Status: DISCONTINUED | OUTPATIENT
Start: 2024-03-15 | End: 2024-03-15

## 2024-03-15 RX ORDER — CLINDAMYCIN PHOSPHATE 900 MG/50ML
INJECTION, SOLUTION INTRAVENOUS
Status: DISCONTINUED | OUTPATIENT
Start: 2024-03-15 | End: 2024-03-15

## 2024-03-15 RX ORDER — HYDROCODONE BITARTRATE AND ACETAMINOPHEN 5; 325 MG/1; MG/1
1 TABLET ORAL EVERY 4 HOURS PRN
Status: DISCONTINUED | OUTPATIENT
Start: 2024-03-15 | End: 2024-03-15 | Stop reason: HOSPADM

## 2024-03-15 RX ORDER — LIDOCAINE HYDROCHLORIDE 10 MG/ML
INJECTION INFILTRATION; PERINEURAL
Status: DISCONTINUED | OUTPATIENT
Start: 2024-03-15 | End: 2024-03-15 | Stop reason: HOSPADM

## 2024-03-15 RX ORDER — FENTANYL CITRATE 50 UG/ML
INJECTION, SOLUTION INTRAMUSCULAR; INTRAVENOUS
Status: DISCONTINUED | OUTPATIENT
Start: 2024-03-15 | End: 2024-03-15

## 2024-03-15 RX ORDER — LIDOCAINE HYDROCHLORIDE 10 MG/ML
1 INJECTION, SOLUTION EPIDURAL; INFILTRATION; INTRACAUDAL; PERINEURAL ONCE AS NEEDED
Status: COMPLETED | OUTPATIENT
Start: 2024-03-15 | End: 2024-03-15

## 2024-03-15 RX ORDER — SODIUM CHLORIDE 0.9 % (FLUSH) 0.9 %
10 SYRINGE (ML) INJECTION
Status: DISCONTINUED | OUTPATIENT
Start: 2024-03-15 | End: 2024-03-15 | Stop reason: HOSPADM

## 2024-03-15 RX ORDER — PROPOFOL 10 MG/ML
VIAL (ML) INTRAVENOUS CONTINUOUS PRN
Status: DISCONTINUED | OUTPATIENT
Start: 2024-03-15 | End: 2024-03-15

## 2024-03-15 RX ORDER — ONDANSETRON HYDROCHLORIDE 2 MG/ML
4 INJECTION, SOLUTION INTRAVENOUS ONCE AS NEEDED
Status: DISCONTINUED | OUTPATIENT
Start: 2024-03-15 | End: 2024-03-15 | Stop reason: HOSPADM

## 2024-03-15 RX ORDER — LIDOCAINE HYDROCHLORIDE 20 MG/ML
INJECTION INTRAVENOUS
Status: DISCONTINUED | OUTPATIENT
Start: 2024-03-15 | End: 2024-03-15

## 2024-03-15 RX ADMIN — Medication 20 MG: at 08:03

## 2024-03-15 RX ADMIN — MIDAZOLAM 2 MG: 1 INJECTION INTRAMUSCULAR; INTRAVENOUS at 07:03

## 2024-03-15 RX ADMIN — SODIUM CHLORIDE: 9 INJECTION, SOLUTION INTRAVENOUS at 05:03

## 2024-03-15 RX ADMIN — LIDOCAINE HYDROCHLORIDE 60 MG: 20 INJECTION INTRAVENOUS at 07:03

## 2024-03-15 RX ADMIN — FENTANYL CITRATE 50 MCG: 50 INJECTION INTRAMUSCULAR; INTRAVENOUS at 07:03

## 2024-03-15 RX ADMIN — HYDROCODONE BITARTRATE AND ACETAMINOPHEN 1 TABLET: 5; 325 TABLET ORAL at 09:03

## 2024-03-15 RX ADMIN — CLINDAMYCIN PHOSPHATE 900 MG: 900 INJECTION, SOLUTION INTRAVENOUS at 07:03

## 2024-03-15 RX ADMIN — FENTANYL CITRATE 25 MCG: 50 INJECTION INTRAMUSCULAR; INTRAVENOUS at 08:03

## 2024-03-15 RX ADMIN — PROPOFOL 150 MCG/KG/MIN: 10 INJECTION, EMULSION INTRAVENOUS at 07:03

## 2024-03-15 RX ADMIN — HYDROMORPHONE HYDROCHLORIDE 0.5 MG: 1 INJECTION, SOLUTION INTRAMUSCULAR; INTRAVENOUS; SUBCUTANEOUS at 10:03

## 2024-03-15 NOTE — PLAN OF CARE
Pt in preop bay 32, VSS and IV inserted. Pt denies any open wounds on body or the use of any weight loss injections. Pt needs an updated H&P, an admit order and anesthesia consents, otherwise ready to roll.

## 2024-03-15 NOTE — ANESTHESIA PREPROCEDURE EVALUATION
03/15/2024  Nano Sosa is a 68 y.o., female.      Pre-op Assessment     I have reviewed the Nursing Notes.    I have reviewed the Medications.     Review of Systems  Anesthesia Hx:  No problems with previous Anesthesia             Denies Family Hx of Anesthesia complications.     Social:  Non-Smoker, No Alcohol Use       Hematology/Oncology:  Hematology Normal   Oncology Normal                                   EENT/Dental:  EENT/Dental Normal           Cardiovascular:  Cardiovascular Normal Exercise tolerance: good                                           Pulmonary:  Pulmonary Normal                       Renal/:  Renal/ Normal                 Hepatic/GI:   PUD,        Peptic Ulcer Disease          Musculoskeletal:  Musculoskeletal Normal                Neurological:      Headaches      Dx of Headaches                           Endocrine:  Endocrine Normal                Physical Exam  General: Well nourished    Airway:  Mallampati: II / II  Mouth Opening: Normal  TM Distance: Normal  Tongue: Normal  Neck ROM: Normal ROM    Dental:  Intact        Anesthesia Plan  Type of Anesthesia, risks & benefits discussed:    Anesthesia Type: Gen ETT, Gen Natural Airway  Intra-op Monitoring Plan: Standard ASA Monitors  Post Op Pain Control Plan: multimodal analgesia  Induction:  IV  Airway Plan: Direct, Post-Induction  Informed Consent: Informed consent signed with the Patient and all parties understand the risks and agree with anesthesia plan.  All questions answered.   ASA Score: 2    Ready For Surgery From Anesthesia Perspective.     .

## 2024-03-15 NOTE — DISCHARGE SUMMARY
[facility]  Discharge Note  Short Stay    Procedure(s) (LRB):  BUNIONECTOMY (Left)  REMOVAL, HARDWARE, FOOT (Right)      OUTCOME: Patient tolerated treatment/procedure well without complication and is now ready for discharge.    DISPOSITION: Home or Self Care    FINAL DIAGNOSIS:  <principal problem not specified>    FOLLOWUP: In clinic    DISCHARGE INSTRUCTIONS:  No discharge procedures on file.     TIME SPENT ON DISCHARGE: 20 minutes

## 2024-03-15 NOTE — ANESTHESIA POSTPROCEDURE EVALUATION
Anesthesia Post Evaluation    Patient: Nano Sosa    Procedure(s) Performed: Procedure(s) (LRB):  BUNIONECTOMY (Left)  REMOVAL, HARDWARE, FOOT (Right)    Final Anesthesia Type: general      Patient location during evaluation: PACU  Patient participation: Yes- Able to Participate  Level of consciousness: awake and alert  Post-procedure vital signs: reviewed and stable  Pain management: adequate  Airway patency: patent    PONV status at discharge: No PONV  Anesthetic complications: no      Cardiovascular status: blood pressure returned to baseline and hemodynamically stable  Respiratory status: unassisted  Hydration status: euvolemic  Follow-up not needed.              Vitals Value Taken Time   BP  03/15/24 0937   Temp  03/15/24 0937   Pulse  03/15/24 0937   Resp  03/15/24 0931   SpO2  03/15/24 0937         No case tracking events are documented in the log.      Pain/Debi Score: Pain Rating Prior to Med Admin: 1 (pt still numb bilateral feet . pre pain med given for expected pain requested per pt) (3/15/2024  9:31 AM)

## 2024-03-15 NOTE — BRIEF OP NOTE
[facility]  Brief Operative Note    Surgery Date: 3/15/2024     Surgeon(s) and Role:     * Ethan Vaughn DPM - Primary    Assisting Surgeon: Jovan Calvo DPM     Pre-op Diagnosis:  Bunion of great toe [M21.619]  Pain in both feet [M79.671, M79.672]    Post-op Diagnosis:  Post-Op Diagnosis Codes:     * Bunion of great toe [M21.619]     * Pain in both feet [M79.671, M79.672]    Procedure(s) (LRB):  BUNIONECTOMY (Left)  REMOVAL, HARDWARE, FOOT (Right)    Anesthesia: Local MAC    Operative Findings:   Left foot Naif + Akin bunionectomy, capsular balancing, with 5th reverse naif and capsule balancing.     Left Foot Attempted removal of prominent screw, screw failed during removing, prominence removed with distal screw remaining. Autogenous bone graph utilized.      Estimated Blood Loss: 5 mL         Specimens:   Specimen (24h ago, onward)      None              Discharge Note    OUTCOME: Patient tolerated treatment/procedure well without complication and is now ready for discharge.    DISPOSITION: Home or Self Care    FINAL DIAGNOSIS:  R HAV & Bunionette, L prominent hardware.     FOLLOWUP: In clinic    DISCHARGE INSTRUCTIONS:    Discharge Procedure Orders   Diet general

## 2024-03-15 NOTE — DISCHARGE SUMMARY
[facility]  Discharge Note  Short Stay    Procedure(s) (LRB):  BUNIONECTOMY (Left)  REMOVAL, HARDWARE, FOOT (Right)      OUTCOME: Patient tolerated treatment/procedure well without complication and is now ready for discharge.    DISPOSITION: Home or Self Care    FINAL DIAGNOSIS:  L HAV & Tailor's bunion, R Prominent hardware     FOLLOWUP: In clinic    DISCHARGE INSTRUCTIONS:    Discharge Procedure Orders   Diet general        TIME SPENT ON DISCHARGE: 25 minutes

## 2024-03-15 NOTE — DISCHARGE INSTRUCTIONS
Elevated, Ice, and Rest  Take it easy first several days   LLE WBAT with post surgical shoe, RLE Heel touch PWB with surgical shoe as discussed   Pain medication as prescribed   Keep dressings clean, dry, and intact until follow with Dr. Vaughn in [place] 1 week   Call [facility] on Call for any questions or concerns.

## 2024-03-15 NOTE — PLAN OF CARE
Pt to bay 16 at 0922. VS stable on transfer. Handoff report received from procedural team. Pain controlled in recovery. Xray done at bedside. Boot applied to left foot. Discharge instructions reviewed with patient and friend. Verbalized understanding. Questions encouraged and answered. PIV removed before DC.

## 2024-03-16 NOTE — OP NOTE
[facility] ([facility])  Operative Note      Date of Procedure: 3/15/2024     Procedure: Procedure(s) (LRB):  BUNIONECTOMY (Left)  REMOVAL, HARDWARE, FOOT (Right)     Surgeon(s) and Role:     * Ethan Vaughn DPM - Primary    Assisting Surgeon:     * Jovan Calvo DPM PGY 3     Pre-Operative Diagnosis: Bunion of great toe [M21.619]  Pain in both feet [M79.671, M79.672]    Post-Operative Diagnosis: Post-Op Diagnosis Codes:     * Bunion of great toe [M21.619]     * Pain in both feet [M79.671, M79.672]    Anesthesia: Local MAC    Description of Technical Procedures:     The patient was transported to the operating room on a stretcher and was transferred to the OR table in supine position. Anesthesia was induced.  A tourniquet was applied to both the left and right ankle. 30 mL of a 1:1 mixture of 0.25% bupivacaine plain and 1% lidocaine plain was locally infiltrated. Both lower limbs were prepped and draped in a sterile manner. Tourniquet(s) inflated to 250 mmHg.       Left Foot:    A longitudinal incision was made over the medial aspect of the 1st MP joint. The incision was then deepened through the subcutaneous tissue, using sharp and blunt dissection. Care was taken to identify and retract all vital neuro and vascular structures.  Soft tissue dissection yielded visualization of the 1st MPJ capsule and a capsulotomy was performed over the medial aspect of the 1st MPJ. All capsular structures were sharply freed from about the head of the dorsal and medial aspect of the 1st Metatarsal. The chondral surface of the first metatarsal head was intact throughout. A partal lateral release was completed from the plantar aspect of the first metatarsal head releasing the fibular sesamoidal ligament and a lateral capsular release was performed from the dorsal aspect of the metatarsal head.     The medial eminence was then resected with a sagittal saw preserving the medial sagittal groove. A k-wire  was inserted just dorsal to the geometric center of the first metatarsal head serving as an axis guide.  An Naif osteotomy was performed from medial to lateral with apex distal. The capital fragment was then translated laterally reducing the intermetatarsal angle and HV angle. Guide wire for the cannulated screws were inserted for temporary fixation. Permanent fixation was accomplished using standard AO technique to place a single partially threaded cannulated screw. Sagittal saw was used to smooth edges of the metatarsal head.     Attention was directed to the proximal phalanx where a K wire was driven into the lateral cortex, and used as an axis guide to perform an Akin osteotomy with saggital saw on the proximal hallux, and fixated with bone staple. Good apposition and compression of the bony fragments as well as rectus 1st MTPJ alignment was noted and noted with intraop fluoroscopy.    A longitudinal incision was made over the 5th dorsal metatarsal using 15 blade, and blunt dissection was done using tenotomy scissors, taking care to protect neurovascular and tendon structures.  A saggital saw was used to do a bumpextomy and  a reverse naif osteotomy, which was fixated by K-wire.  A single partially threaded cannulated screw was used to fixate 5th met using AO principles, and Sagittal saw was used to smooth out the edges of metatarsal head.     Layered closure with 3-0 vicryl, 4-0 monocryl, and 3-0 nylon    Right Foot:     Prominent screw associated with hardware irritation localized under fluoroscopy. A 2 cm linear incision was carried down to screw with 15 blade. Using , rongeur, and pliers removal complete removal of single naif screw was unsuccessful, screw broke at run out and was resected below the level of bone. Layered closure with with 3-0 vicryl and nylon.     The surgical site was irrigated with normal saline solution via bulb syringe.  The surgical site was dressed with xeroform, 4x4  gauze, cast padding, and ACE wrap. Tourniquet(s) deflated.     Estimated Blood Loss (EBL): 5 mL           Implants:   Implant Name Type Inv. Item Serial No.  Lot No. LRB No. Used Action   SCREW QUITA 2.0 ASNIS [id number] - [id number]  SCREW QUITA 2.0 ASNIS 22X5MM  [employer].  Left 1 Implanted   3.0 x 26mm cannulated screw      Left 1 Implanted   [employer] nitinol staple kit    [email or web address]  Left 1 Implanted       Specimens:   Specimen (24h ago, onward)      None                    Condition: Good    Disposition: PACU - hemodynamically stable.    Attestation: I was present and scrubbed for the entire procedure.    Discharge Note    OUTCOME: Patient tolerated treatment/procedure well without complication and is now ready for discharge.    DISPOSITION: Home or Self Care    FINAL DIAGNOSIS:  R foot hardware irritation, L Foot HAV and Tailor's bunion     FOLLOWUP: In clinic    DISCHARGE INSTRUCTIONS:    Discharge Procedure Orders   Diet general        Clinical Reference Documents Added to Patient Instructions         Document    BUNION REMOVAL DISCHARGE INSTRUCTIONS (ENGLISH)

## 2024-03-18 ENCOUNTER — PATIENT MESSAGE (OUTPATIENT)
Dept: PODIATRY | Facility: CLINIC | Age: 69
End: 2024-03-18
Payer: MEDICARE

## 2024-03-18 DIAGNOSIS — M21.619 BUNION OF GREAT TOE: Primary | ICD-10-CM

## 2024-03-18 DIAGNOSIS — G89.18 POST-OP PAIN: ICD-10-CM

## 2024-03-19 ENCOUNTER — ANESTHESIA EVENT (OUTPATIENT)
Dept: ENDOSCOPY | Facility: HOSPITAL | Age: 69
End: 2024-03-19
Payer: MEDICARE

## 2024-03-19 ENCOUNTER — TELEPHONE (OUTPATIENT)
Dept: GASTROENTEROLOGY | Facility: CLINIC | Age: 69
End: 2024-03-19
Payer: MEDICARE

## 2024-03-19 ENCOUNTER — HOSPITAL ENCOUNTER (OUTPATIENT)
Facility: HOSPITAL | Age: 69
Discharge: HOME | End: 2024-03-19
Attending: INTERNAL MEDICINE | Admitting: INTERNAL MEDICINE
Payer: MEDICARE

## 2024-03-19 ENCOUNTER — ANESTHESIA (OUTPATIENT)
Dept: ENDOSCOPY | Facility: HOSPITAL | Age: 69
End: 2024-03-19
Payer: MEDICARE

## 2024-03-19 VITALS
SYSTOLIC BLOOD PRESSURE: 120 MMHG | BODY MASS INDEX: 29.19 KG/M2 | HEART RATE: 63 BPM | HEIGHT: 67 IN | RESPIRATION RATE: 18 BRPM | OXYGEN SATURATION: 99 % | WEIGHT: 186 LBS | DIASTOLIC BLOOD PRESSURE: 59 MMHG | TEMPERATURE: 98 F

## 2024-03-19 DIAGNOSIS — Z86.010 HISTORY OF COLON POLYPS: Primary | ICD-10-CM

## 2024-03-19 DIAGNOSIS — K51.90 ULCERATIVE COLITIS WITHOUT COMPLICATIONS, UNSPECIFIED LOCATION: Primary | ICD-10-CM

## 2024-03-19 DIAGNOSIS — K51.90 ULCERATIVE COLITIS: ICD-10-CM

## 2024-03-19 PROCEDURE — 88305 TISSUE EXAM BY PATHOLOGIST: CPT | Performed by: PATHOLOGY

## 2024-03-19 PROCEDURE — 27201012 HC FORCEPS, HOT/COLD, DISP: Performed by: INTERNAL MEDICINE

## 2024-03-19 PROCEDURE — E9220 PRA ENDO ANESTHESIA: HCPCS | Mod: ,,, | Performed by: NURSE ANESTHETIST, CERTIFIED REGISTERED

## 2024-03-19 PROCEDURE — 45331 SIGMOIDOSCOPY AND BIOPSY: CPT | Performed by: INTERNAL MEDICINE

## 2024-03-19 PROCEDURE — 88305 TISSUE EXAM BY PATHOLOGIST: CPT | Mod: 26,,, | Performed by: PATHOLOGY

## 2024-03-19 PROCEDURE — 25000003 PHARM REV CODE 250: Performed by: NURSE ANESTHETIST, CERTIFIED REGISTERED

## 2024-03-19 PROCEDURE — 37000009 HC ANESTHESIA EA ADD 15 MINS: Performed by: INTERNAL MEDICINE

## 2024-03-19 PROCEDURE — 45331 SIGMOIDOSCOPY AND BIOPSY: CPT | Mod: ,,, | Performed by: INTERNAL MEDICINE

## 2024-03-19 PROCEDURE — 88342 IMHCHEM/IMCYTCHM 1ST ANTB: CPT | Mod: 26,,, | Performed by: PATHOLOGY

## 2024-03-19 PROCEDURE — 88342 IMHCHEM/IMCYTCHM 1ST ANTB: CPT | Performed by: PATHOLOGY

## 2024-03-19 PROCEDURE — 37000008 HC ANESTHESIA 1ST 15 MINUTES: Performed by: INTERNAL MEDICINE

## 2024-03-19 RX ORDER — MESALAMINE 1000 MG/1
1000 SUPPOSITORY RECTAL NIGHTLY
Qty: 30 SUPPOSITORY | Refills: 1 | Status: SHIPPED | OUTPATIENT
Start: 2024-03-19 | End: 2024-06-03

## 2024-03-19 RX ORDER — PROPOFOL 10 MG/ML
VIAL (ML) INTRAVENOUS CONTINUOUS PRN
Status: DISCONTINUED | OUTPATIENT
Start: 2024-03-19 | End: 2024-03-19

## 2024-03-19 RX ORDER — SODIUM CHLORIDE 9 MG/ML
INJECTION, SOLUTION INTRAVENOUS CONTINUOUS
Status: DISCONTINUED | OUTPATIENT
Start: 2024-03-19 | End: 2024-03-19 | Stop reason: HOSPADM

## 2024-03-19 RX ADMIN — Medication 125 MCG/KG/MIN: at 04:03

## 2024-03-19 RX ADMIN — SODIUM CHLORIDE: 9 INJECTION, SOLUTION INTRAVENOUS at 03:03

## 2024-03-19 RX ADMIN — Medication 80 MG: at 04:03

## 2024-03-19 NOTE — ANESTHESIA POSTPROCEDURE EVALUATION
Anesthesia Post Evaluation    Patient: Nano Sosa    Procedure(s) Performed: Procedure(s) (LRB):  SIGMOIDOSCOPY, FLEXIBLE (N/A)    Final Anesthesia Type: general      Patient location during evaluation: GI PACU  Patient participation: Yes- Able to Participate  Level of consciousness: awake and alert and oriented  Post-procedure vital signs: reviewed and stable  Pain management: adequate  Airway patency: patent    PONV status at discharge: No PONV  Anesthetic complications: no      Cardiovascular status: blood pressure returned to baseline and hemodynamically stable  Respiratory status: unassisted, spontaneous ventilation and room air  Hydration status: euvolemic  Follow-up not needed.              Vitals Value Taken Time   /63 03/19/24 1634   Temp 36.7 °C (98.1 °F) 03/19/24 1622   Pulse 60 03/19/24 1634   Resp 18 03/19/24 1634   SpO2 97 % 03/19/24 1634         No case tracking events are documented in the log.      Pain/Debi Score: Debi Score: 10 (3/19/2024  4:34 PM)

## 2024-03-19 NOTE — PROVATION PATIENT INSTRUCTIONS
Discharge Summary/Instructions after an Endoscopic Procedure  Patient Name: Nano Sosa  Patient MRN: [id number]  Patient YOB: 1955 Tuesday, March 19, 2024  Srinivas Linares MD  Dear patient,  As a result of recent federal legislation (The Federal Cures Act), you may   receive lab or pathology results from your procedure in your [facility]   account before your physician is able to contact you. Your physician or   their representative will relay the results to you with their   recommendations at their soonest availability.  Thank you,  RESTRICTIONS:  During your procedure today, you received medications for sedation.  These   medications may affect your judgment, balance and coordination.  Therefore,   for 24 hours, you have the following restrictions:   - DO NOT drive a car, operate machinery, make legal/financial decisions,   sign important papers or drink alcohol.    ACTIVITY:  Today: no heavy lifting, straining or running due to procedural   sedation/anesthesia.  The following day: return to full activity including work.  DIET:  Eat and drink normally unless instructed otherwise.     TREATMENT FOR COMMON SIDE EFFECTS:  - Mild abdominal pain, nausea, belching, bloating or excessive gas:  rest,   eat lightly and use a heating pad.  - Sore Throat: treat with throat lozenges and/or gargle with warm salt   water.  - Because air was used during the procedure, expelling large amounts of air   from your rectum or belching is normal.  - If a bowel prep was taken, you may not have a bowel movement for 1-3 days.    This is normal.  SYMPTOMS TO WATCH FOR AND REPORT TO YOUR PHYSICIAN:  1. Abdominal pain or bloating, other than gas cramps.  2. Chest pain.  3. Back pain.  4. Signs of infection such as: chills or fever occurring within 24 hours   after the procedure.  5. Rectal bleeding, which would show as bright red, maroon, or black stools.   (A tablespoon of blood from the rectum is not serious, especially  if   hemorrhoids are present.)  6. Vomiting.  7. Weakness or dizziness.  GO DIRECTLY TO THE NEAREST EMERGENCY ROOM IF YOU HAVE ANY OF THE FOLLOWING:      Difficulty breathing              Chills and/or fever over 101 F   Persistent vomiting and/or vomiting blood   Severe abdominal pain   Severe chest pain   Black, tarry stools   Bleeding- more than one tablespoon   Any other symptom or condition that you feel may need urgent attention  Your doctor recommends these additional instructions:  If any biopsies were taken, your doctors clinic will contact you in 1 to 2   weeks with any results.  - Discharge patient to home.   - Patient has a contact number available for emergencies.  The signs and   symptoms of potential delayed complications were discussed with the   patient.  Return to normal activities tomorrow.  Written discharge   instructions were provided to the patient.   - Resume previous diet.   - Await pathology results.   - Use Canasa 1000 mg suppository 1 per rectum QHS. Sent to [facility].    - In 1 week turn in stool calprotectin.   - We will schedule you for appt within next 4-6 weeks.   For questions, problems or results please call your physician - Srinivas Linares MD at Work:  [phone].  [facility] [place], EMERGENCY ROOM PHONE NUMBER: [phone]  IF A COMPLICATION OR EMERGENCY SITUATION ARISES AND YOU ARE UNABLE TO REACH   YOUR PHYSICIAN - GO DIRECTLY TO THE EMERGENCY ROOM.  Srinivas Linares MD  3/19/2024 4:52:10 PM  This report has been verified and signed electronically.  Dear patient,  As a result of recent federal legislation (The Federal Cures Act), you may   receive lab or pathology results from your procedure in your [facility]   account before your physician is able to contact you. Your physician or   their representative will relay the results to you with their   recommendations at their soonest availability.  Thank you,  PROVATION

## 2024-03-19 NOTE — ANESTHESIA PREPROCEDURE EVALUATION
03/19/2024  Nano Sosa is a 68 y.o., female.    Past Medical History:   Diagnosis Date    General anesthetics causing adverse effect in therapeutic use     wakes up hyperventilating/chills    Goiter     Hashimoto's disease     Migraine     Osteopenia     Rectal mass     Ulcerative colitis     Urinary tract infection     Vitamin D deficiency      Past Surgical History:   Procedure Laterality Date    BUNIONECTOMY      right    BUNIONECTOMY Left 3/15/2024    Procedure: BUNIONECTOMY;  Surgeon: Ethan Vaughn DPM;  Location: [facility] OR;  Service: Podiatry;  Laterality: Left;    colon sx      COLONOSCOPY N/A 12/19/2016    Procedure: COLONOSCOPY;  Surgeon: Alli Peña MD;  Location: [facility] ENDO (4TH FLR);  Service: Endoscopy;  Laterality: N/A;    COLONOSCOPY N/A 1/7/2019    Procedure: COLONOSCOPY;  Surgeon: Vel Self MD;  Location: [facility] ENDO (4TH FLR);  Service: Endoscopy;  Laterality: N/A;  Pt requests Dr. Peña, Pt ok with Dr. Self    COLONOSCOPY N/A 2/13/2023    Procedure: COLONOSCOPY;  Surgeon: Srinivas Linares MD;  Location: [facility] ENDO (4TH FLR);  Service: Endoscopy;  Laterality: N/A;  instructions via portal -   pt request Sutab  chgd from [facility]--1/17  Precall complete- [place]    f.e.s.s.  12/5/2014    FACIAL COSMETIC SURGERY      FOOT HARDWARE REMOVAL Right 3/15/2024    Procedure: REMOVAL, HARDWARE, FOOT;  Surgeon: Ethan Vaughn DPM;  Location: [facility] OR;  Service: Podiatry;  Laterality: Right;    TOTAL ABDOMINAL HYSTERECTOMY  1994    [facility]     Patient Active Problem List   Diagnosis    Thyroid nodule    History of Hashimoto thyroiditis    Environmental allergies    History of colon polyps    Mild vitamin D deficiency    Rectal mass    Pulmonary nodules    Colon polyp    Other osteoporosis without current pathological fracture            Pre-op Assessment    I have reviewed the Patient Summary Reports.     I have reviewed the Nursing Notes. I have reviewed the NPO Status.   I have reviewed the Medications.     Review of Systems  Anesthesia Hx:  No problems with previous Anesthesia             Denies Family Hx of Anesthesia complications.    Denies Personal Hx of Anesthesia complications.                    Hematology/Oncology:  Hematology Normal                                     Cardiovascular:  Cardiovascular Normal                                            Hepatic/GI:   PUD,               Musculoskeletal:  Musculoskeletal Normal                Neurological:      Headaches                                 Dermatological:  Skin Normal        Physical Exam  General: Cooperative, Alert and Oriented    Airway:  Mallampati: II   Mouth Opening: Normal  TM Distance: Normal  Tongue: Normal  Neck ROM: Normal ROM    Dental:  Intact    Anesthesia Plan  Type of Anesthesia, risks & benefits discussed:    Anesthesia Type: Gen Natural Airway  Intra-op Monitoring Plan: Standard ASA Monitors  Induction:  IV  Informed Consent: Informed consent signed with the Patient and all parties understand the risks and agree with anesthesia plan.  All questions answered.   ASA Score: 2  Day of Surgery Review of History & Physical: H&P Update referred to the surgeon/provider.I have interviewed and examined the patient. I have reviewed the patient's H&P dated: There are no significant changes.     Ready For Surgery From Anesthesia Perspective.   .

## 2024-03-19 NOTE — H&P
Short Stay Endoscopy History and Physical    PCP - Lauren Fountain MD  Referring Physician - Srinivas Linares MD  [address] [place] [address]    Procedure - Flexible Sigmoidoscopy  ASA - per anesthesia  Mallampati - per anesthesia  History of Anesthesia problems - no  Family history Anesthesia problems -  no   Plan of anesthesia - General    HPI  68 y.o. female  Reason for procedure:   Ulcerative colitis f/u and surveillance with h/o rectal adenoma     ROS:  Constitutional: No fevers, chills, No weight loss  CV: No chest pain  Pulm: No cough, No shortness of breath  GI: see HPI    Medical History:  has a past medical history of General anesthetics causing adverse effect in therapeutic use, Goiter, Hashimoto's disease, Migraine, Osteopenia, Rectal mass, Ulcerative colitis, Urinary tract infection, and Vitamin D deficiency.    Surgical History:  has a past surgical history that includes Facial cosmetic surgery; Bunionectomy; f.e.s.s. (12/5/2014); colon sx; Colonoscopy (N/A, 12/19/2016); Total abdominal hysterectomy (1994); Colonoscopy (N/A, 1/7/2019); Colonoscopy (N/A, 2/13/2023); Bunionectomy (Left, 3/15/2024); and Foot hardware removal (Right, 3/15/2024).    Family History: family history includes Breast cancer in her maternal aunt; Colon cancer in her maternal aunt; Heart disease in her maternal uncle; Hypertension in her maternal uncle; Migraines in her mother; Rheum arthritis in her mother.. Otherwise no colon cancer, inflammatory bowel disease, or GI malignancies.    Social History:  reports that she quit smoking about 24 years ago. Her smoking use included cigarettes. She started smoking about 54 years ago. She has a 30.0 pack-year smoking history. She has never been exposed to tobacco smoke. She has never used smokeless tobacco. She reports current alcohol use of about 6.0 standard drinks of alcohol per week. She reports that she does not use drugs.    Review of patient's allergies  indicates:   Allergen Reactions    Penicillins Shortness Of Breath     Throat swelling    Poison ivy extract Dermatitis, Hives, Itching, Rash and Swelling    Poison sumac extract Blisters, Dermatitis, Hives, Itching, Rash and Swelling    Sulfa (sulfonamide antibiotics) Hives and Rash    Codeine Itching     Hallucinations, sweats    Erythromycin Itching and Swelling    Tessalon [benzonatate] Swelling    Unable to assess      TUNA--Flushing of skin       Medications:   Facility-Administered Medications Prior to Admission   Medication Dose Route Frequency Provider Last Rate Last Admin    LIDOcaine HCL 10 mg/ml (1%) injection 1 mL  1 mL Other 1 time in Clinic/HOD Vel Capmos MD         Medications Prior to Admission   Medication Sig Dispense Refill Last Dose    acetaminophen (TYLENOL) 325 MG tablet Take 2 tablets (650 mg total) by mouth every 6 (six) hours as needed for Pain. 30 tablet 0 3/19/2024    ALPRAZolam (XANAX) 0.5 MG tablet Take 0.5 mg by mouth.   Past Week    b complex vitamins tablet Take 1 tablet by mouth once daily.   Past Week    calcium carbonate (OS-CONRAD) 600 mg calcium (1,500 mg) Tab Take 600 mg by mouth once.   Past Week    ferrous sulfate (FEOSOL) 325 mg (65 mg iron) Tab tablet Take 1 tablet (325 mg total) by mouth once daily. 90 tablet 3 Past Week    fluticasone propionate (FLONASE) 50 mcg/actuation nasal spray 1 spray (50 mcg total) by Each Nostril route once daily. 16 g 3 Past Week    multivitamin capsule Take 1 capsule by mouth once daily.   Past Week    oxyCODONE-acetaminophen (PERCOCET) 7.5-325 mg per tablet Take 1 tablet by mouth every 6 (six) hours as needed for Pain. 30 tablet 0 Past Week    vitamin D (VITAMIN D3) 1000 units Tab Take 4,000 Units by mouth once daily.   Past Week       Physical Exam:    Vital Signs:   Vitals:    03/19/24 1433   BP: 133/67   Pulse: 65   Resp: 15   Temp: 97.9 °F (36.6 °C)       General Appearance: Well appearing in no acute distress  Abdomen: Soft, non  tender, non distended with normal bowel sounds, no masses    Labs:  Lab Results   Component Value Date    WBC 6.74 02/29/2024    HGB 12.6 02/29/2024    HCT 39.2 02/29/2024     02/29/2024    CHOL 178 08/02/2023    TRIG 74 08/02/2023    HDL 75 08/02/2023    ALT 16 02/29/2024    AST 17 02/29/2024     02/29/2024    K 4.1 02/29/2024     02/29/2024    CREATININE 0.7 02/29/2024    BUN 17 02/29/2024    CO2 26 02/29/2024    TSH 1.952 08/02/2023    INR 1.0 02/29/2024    HGBA1C 5.0 08/02/2023       I have explained the risks and benefits of this endoscopic procedure to the patient including but not limited to bleeding, inflammation, infection, perforation, and death.      Srinivas Linares MD

## 2024-03-19 NOTE — TRANSFER OF CARE
"Anesthesia Transfer of Care Note    Patient: Nano Sosa    Procedure(s) Performed: Procedure(s) (LRB):  SIGMOIDOSCOPY, FLEXIBLE (N/A)    Patient location: GI    Anesthesia Type: general    Transport from OR: Transported from OR on room air with adequate spontaneous ventilation    Post pain: adequate analgesia    Post assessment: no apparent anesthetic complications and tolerated procedure well    Post vital signs: stable    Level of consciousness: awake, alert and oriented    Nausea/Vomiting: no nausea/vomiting    Complications: none    Transfer of care protocol was followed    Last vitals: Visit Vitals  /67   Pulse 65   Temp 36.6 °C (97.9 °F)   Resp 15   Ht 5' 7" (1.702 m)   Wt 84.4 kg (186 lb)   SpO2 95%   Breastfeeding No   BMI 29.13 kg/m²     "

## 2024-03-20 ENCOUNTER — TELEPHONE (OUTPATIENT)
Dept: GASTROENTEROLOGY | Facility: CLINIC | Age: 69
End: 2024-03-20
Payer: MEDICARE

## 2024-03-20 ENCOUNTER — OFFICE VISIT (OUTPATIENT)
Dept: PODIATRY | Facility: CLINIC | Age: 69
End: 2024-03-20
Payer: MEDICARE

## 2024-03-20 ENCOUNTER — PATIENT MESSAGE (OUTPATIENT)
Dept: GASTROENTEROLOGY | Facility: CLINIC | Age: 69
End: 2024-03-20
Payer: MEDICARE

## 2024-03-20 DIAGNOSIS — M21.619 BUNION OF GREAT TOE: Primary | ICD-10-CM

## 2024-03-20 PROCEDURE — 99999 PR PBB SHADOW E&M-EST. PATIENT-LVL III: CPT | Mod: PBBFAC,,, | Performed by: PODIATRIST

## 2024-03-20 PROCEDURE — 99024 POSTOP FOLLOW-UP VISIT: CPT | Mod: S$GLB,,, | Performed by: PODIATRIST

## 2024-03-20 PROCEDURE — 1125F AMNT PAIN NOTED PAIN PRSNT: CPT | Mod: CPTII,S$GLB,, | Performed by: PODIATRIST

## 2024-03-20 RX ORDER — DOXYCYCLINE 100 MG/1
100 CAPSULE ORAL EVERY 12 HOURS
Qty: 14 CAPSULE | Refills: 0 | Status: SHIPPED | OUTPATIENT
Start: 2024-03-20 | End: 2024-05-23

## 2024-03-20 NOTE — TELEPHONE ENCOUNTER
----- Message from Srinivas Linares MD sent at 3/19/2024  4:54 PM CDT -----  Schedule pt for appt in next 4-6 weeks please  SS  ----- Message -----  From: Interface, Lab In [facility]  Sent: 3/19/2024   4:52 PM CDT  To: Srinivas Linares MD

## 2024-03-25 LAB
FINAL PATHOLOGIC DIAGNOSIS: NORMAL
GROSS: NORMAL
Lab: NORMAL
MICROSCOPIC EXAM: NORMAL

## 2024-03-28 ENCOUNTER — PATIENT MESSAGE (OUTPATIENT)
Dept: PODIATRY | Facility: CLINIC | Age: 69
End: 2024-03-28

## 2024-03-28 ENCOUNTER — LAB VISIT (OUTPATIENT)
Dept: LAB | Facility: HOSPITAL | Age: 69
End: 2024-03-28
Attending: INTERNAL MEDICINE
Payer: MEDICARE

## 2024-03-28 ENCOUNTER — PATIENT MESSAGE (OUTPATIENT)
Dept: PODIATRY | Facility: CLINIC | Age: 69
End: 2024-03-28
Payer: MEDICARE

## 2024-03-28 ENCOUNTER — OFFICE VISIT (OUTPATIENT)
Dept: PODIATRY | Facility: CLINIC | Age: 69
End: 2024-03-28
Payer: MEDICARE

## 2024-03-28 VITALS
HEIGHT: 67 IN | DIASTOLIC BLOOD PRESSURE: 72 MMHG | SYSTOLIC BLOOD PRESSURE: 111 MMHG | BODY MASS INDEX: 29.13 KG/M2 | HEART RATE: 73 BPM

## 2024-03-28 DIAGNOSIS — M21.619 BUNION OF GREAT TOE: Primary | ICD-10-CM

## 2024-03-28 DIAGNOSIS — K51.90 ULCERATIVE COLITIS WITHOUT COMPLICATIONS, UNSPECIFIED LOCATION: ICD-10-CM

## 2024-03-28 PROCEDURE — 3074F SYST BP LT 130 MM HG: CPT | Mod: CPTII,S$GLB,, | Performed by: PODIATRIST

## 2024-03-28 PROCEDURE — 1125F AMNT PAIN NOTED PAIN PRSNT: CPT | Mod: CPTII,S$GLB,, | Performed by: PODIATRIST

## 2024-03-28 PROCEDURE — 3078F DIAST BP <80 MM HG: CPT | Mod: CPTII,S$GLB,, | Performed by: PODIATRIST

## 2024-03-28 PROCEDURE — 1159F MED LIST DOCD IN RCRD: CPT | Mod: CPTII,S$GLB,, | Performed by: PODIATRIST

## 2024-03-28 PROCEDURE — 83993 ASSAY FOR CALPROTECTIN FECAL: CPT | Performed by: INTERNAL MEDICINE

## 2024-03-28 PROCEDURE — 1160F RVW MEDS BY RX/DR IN RCRD: CPT | Mod: CPTII,S$GLB,, | Performed by: PODIATRIST

## 2024-03-28 PROCEDURE — 99999 PR PBB SHADOW E&M-EST. PATIENT-LVL III: CPT | Mod: PBBFAC,,, | Performed by: PODIATRIST

## 2024-03-28 PROCEDURE — 99024 POSTOP FOLLOW-UP VISIT: CPT | Mod: S$GLB,,, | Performed by: PODIATRIST

## 2024-04-01 LAB — CALPROTECTIN STL-MCNT: 609 MCG/G

## 2024-04-07 NOTE — PROGRESS NOTES
Patient presents proximally 1 week status post bunionectomy procedure.  She is doing well.  Mild to minimal discomfort.  Incision healing well.  No signs of infection or dehiscence.  Neurovascular status intact.  Continue postoperative instructions and follow-up in 1 week.

## 2024-04-10 ENCOUNTER — OFFICE VISIT (OUTPATIENT)
Dept: PODIATRY | Facility: CLINIC | Age: 69
End: 2024-04-10
Payer: MEDICARE

## 2024-04-10 VITALS
SYSTOLIC BLOOD PRESSURE: 112 MMHG | BODY MASS INDEX: 29.13 KG/M2 | HEIGHT: 67 IN | HEART RATE: 59 BPM | DIASTOLIC BLOOD PRESSURE: 62 MMHG

## 2024-04-10 DIAGNOSIS — M21.619 BUNION OF GREAT TOE: Primary | ICD-10-CM

## 2024-04-10 PROCEDURE — 99024 POSTOP FOLLOW-UP VISIT: CPT | Mod: S$GLB,,, | Performed by: PODIATRIST

## 2024-04-10 PROCEDURE — 3078F DIAST BP <80 MM HG: CPT | Mod: CPTII,S$GLB,, | Performed by: PODIATRIST

## 2024-04-10 PROCEDURE — 3074F SYST BP LT 130 MM HG: CPT | Mod: CPTII,S$GLB,, | Performed by: PODIATRIST

## 2024-04-10 PROCEDURE — 1125F AMNT PAIN NOTED PAIN PRSNT: CPT | Mod: CPTII,S$GLB,, | Performed by: PODIATRIST

## 2024-04-10 PROCEDURE — 99999 PR PBB SHADOW E&M-EST. PATIENT-LVL III: CPT | Mod: PBBFAC,,, | Performed by: PODIATRIST

## 2024-04-11 RX ORDER — MESALAMINE 1000 MG/1
1000 SUPPOSITORY RECTAL NIGHTLY
Qty: 90 SUPPOSITORY | Refills: 1 | OUTPATIENT
Start: 2024-04-11

## 2024-04-17 NOTE — PROGRESS NOTES
Patient presents proximally 2 weeks status post bunionectomy procedure.  She is doing well.  Mild to minimal discomfort.  Incision healing well.  No signs of infection or dehiscence.  Neurovascular status intact.  Continue postoperative instructions and follow-up in 1 week.

## 2024-04-21 NOTE — PROGRESS NOTES
Patient presents proximally 3 weeks status post bunionectomy procedure.  She is doing well.  Mild to minimal discomfort.  Incision healing well.  No signs of infection or dehiscence.  Neurovascular status intact.  Sutures removed under asceptic conditions, dry sterile bandage applied. Patient is cleared to begin light bathing of operative site.  No oils, lotions, or ointments to incision for two weeks  Continue postoperative instructions and follow-up in 3 weeks.

## 2024-05-01 ENCOUNTER — HOSPITAL ENCOUNTER (OUTPATIENT)
Dept: RADIOLOGY | Facility: HOSPITAL | Age: 69
Discharge: HOME | End: 2024-05-01
Attending: PODIATRIST
Payer: MEDICARE

## 2024-05-01 ENCOUNTER — OFFICE VISIT (OUTPATIENT)
Dept: PODIATRY | Facility: CLINIC | Age: 69
End: 2024-05-01
Payer: MEDICARE

## 2024-05-01 VITALS — BODY MASS INDEX: 29.13 KG/M2 | HEIGHT: 67 IN

## 2024-05-01 DIAGNOSIS — M21.619 BUNION OF GREAT TOE: ICD-10-CM

## 2024-05-01 DIAGNOSIS — M21.619 BUNION OF GREAT TOE: Primary | ICD-10-CM

## 2024-05-01 PROCEDURE — 73630 X-RAY EXAM OF FOOT: CPT | Mod: 26,50,, | Performed by: INTERNAL MEDICINE

## 2024-05-01 PROCEDURE — 1126F AMNT PAIN NOTED NONE PRSNT: CPT | Mod: CPTII,S$GLB,, | Performed by: PODIATRIST

## 2024-05-01 PROCEDURE — 99024 POSTOP FOLLOW-UP VISIT: CPT | Mod: S$GLB,,, | Performed by: PODIATRIST

## 2024-05-01 PROCEDURE — 73630 X-RAY EXAM OF FOOT: CPT | Mod: TC,50

## 2024-05-01 PROCEDURE — 99999 PR PBB SHADOW E&M-EST. PATIENT-LVL III: CPT | Mod: PBBFAC,,, | Performed by: PODIATRIST

## 2024-05-01 PROCEDURE — 1159F MED LIST DOCD IN RCRD: CPT | Mod: CPTII,S$GLB,, | Performed by: PODIATRIST

## 2024-05-01 RX ORDER — DICLOFENAC SODIUM 10 MG/G
2 GEL TOPICAL 4 TIMES DAILY
Qty: 100 G | Refills: 2 | Status: SHIPPED | OUTPATIENT
Start: 2024-05-01

## 2024-05-01 NOTE — PROGRESS NOTES
Patient presents proximally 6 weeks status post bunionectomy procedure.  She is doing well.  Mild to minimal discomfort.  Incision healing well.  No signs of infection or dehiscence.  Neurovascular status intact.  Good range of motion noted.  Good anatomical alignment surgical sites.  Radiographs ordered.  Begin slow transition out of walking boot to tolerance over the next 2-4 weeks.  Follow-up in 4 weeks.

## 2024-05-16 ENCOUNTER — TELEPHONE (OUTPATIENT)
Dept: GASTROENTEROLOGY | Facility: CLINIC | Age: 69
End: 2024-05-16
Payer: MEDICARE

## 2024-05-16 NOTE — TELEPHONE ENCOUNTER
----- Message from Jacklyn Cisneros sent at 5/16/2024  8:52 AM CDT -----  Name of Who is Calling:HARVEY BARBOUR [[id number]]        What is the request in detail:Pt would like a callback to maurisio 5/16 todays- due to not felling well.Please advise thank you       Can the clinic reply by [facility]:NO        What [facility] Back [facility]Telephone Information:  Mobile          [phone]

## 2024-05-16 NOTE — TELEPHONE ENCOUNTER
"Spoke with Nano:  IBD meds:  - Canasa qHS (doing 3x/wk)    Needs to reschedule today's OV d/t illness.    - reports sxs started 5/13:   - cough, sinus and chest congestion, HA, nausea, "feels warm" but does not have thermometer   - home COVID test neg   - started to feel better yesterday but worse today  - enc her to turn in stool sample, was due 1 wk prior to today's appt per POC, has stool kit  - enc her to use the Canasa nightly   - new appt 6/3/24  - she states understanding and agrees with this plan    Dr. Linares will be updated.  "

## 2024-05-20 ENCOUNTER — PATIENT MESSAGE (OUTPATIENT)
Dept: INTERNAL MEDICINE | Facility: CLINIC | Age: 69
End: 2024-05-20
Payer: MEDICARE

## 2024-05-23 ENCOUNTER — OFFICE VISIT (OUTPATIENT)
Dept: INTERNAL MEDICINE | Facility: CLINIC | Age: 69
End: 2024-05-23
Payer: MEDICARE

## 2024-05-23 VITALS
TEMPERATURE: 99 F | OXYGEN SATURATION: 98 % | SYSTOLIC BLOOD PRESSURE: 124 MMHG | HEIGHT: 67 IN | BODY MASS INDEX: 30.04 KG/M2 | HEART RATE: 56 BPM | WEIGHT: 191.38 LBS | DIASTOLIC BLOOD PRESSURE: 78 MMHG

## 2024-05-23 DIAGNOSIS — J01.90 ACUTE BACTERIAL SINUSITIS: Primary | ICD-10-CM

## 2024-05-23 DIAGNOSIS — B96.89 ACUTE BACTERIAL SINUSITIS: Primary | ICD-10-CM

## 2024-05-23 PROCEDURE — 99213 OFFICE O/P EST LOW 20 MIN: CPT | Mod: S$GLB,,, | Performed by: INTERNAL MEDICINE

## 2024-05-23 PROCEDURE — 3078F DIAST BP <80 MM HG: CPT | Mod: CPTII,S$GLB,, | Performed by: INTERNAL MEDICINE

## 2024-05-23 PROCEDURE — 3288F FALL RISK ASSESSMENT DOCD: CPT | Mod: CPTII,S$GLB,, | Performed by: INTERNAL MEDICINE

## 2024-05-23 PROCEDURE — 99999 PR PBB SHADOW E&M-EST. PATIENT-LVL III: CPT | Mod: PBBFAC,,, | Performed by: INTERNAL MEDICINE

## 2024-05-23 PROCEDURE — 1101F PT FALLS ASSESS-DOCD LE1/YR: CPT | Mod: CPTII,S$GLB,, | Performed by: INTERNAL MEDICINE

## 2024-05-23 PROCEDURE — 3074F SYST BP LT 130 MM HG: CPT | Mod: CPTII,S$GLB,, | Performed by: INTERNAL MEDICINE

## 2024-05-23 PROCEDURE — 3008F BODY MASS INDEX DOCD: CPT | Mod: CPTII,S$GLB,, | Performed by: INTERNAL MEDICINE

## 2024-05-23 PROCEDURE — 1126F AMNT PAIN NOTED NONE PRSNT: CPT | Mod: CPTII,S$GLB,, | Performed by: INTERNAL MEDICINE

## 2024-05-23 RX ORDER — DOXYCYCLINE HYCLATE 100 MG
100 TABLET ORAL 2 TIMES DAILY
Qty: 14 TABLET | Refills: 0 | Status: SHIPPED | OUTPATIENT
Start: 2024-05-23 | End: 2024-05-30

## 2024-05-23 NOTE — PROGRESS NOTES
Subjective:       Patient ID: Nano Sosa is a 69 y.o. female.    Chief Complaint: URI    Presents for evaluation of upper respiratory     Started with symptoms 10 days ago. Had sinus congestion, sore throat and cough. Also had associated headache and fever or 101.   She started taking Delsym and flonase without relief.   She had left over doxy and started taking it which did make her feel better initially but she ran out and is now having symptoms again       URI   Pertinent negatives include no abdominal pain, chest pain, coughing, diarrhea, dysuria, ear pain, headaches, nausea, sneezing or vomiting.     Review of Systems   Constitutional:  Negative for activity change, appetite change and chills.   HENT:  Negative for ear pain, sinus pressure/congestion and sneezing.    Respiratory:  Negative for cough and shortness of breath.    Cardiovascular:  Negative for chest pain, palpitations and leg swelling.   Gastrointestinal:  Negative for abdominal distention, abdominal pain, constipation, diarrhea, nausea and vomiting.   Genitourinary:  Negative for dysuria and hematuria.   Musculoskeletal:  Negative for arthralgias, back pain and myalgias.   Neurological:  Negative for dizziness and headaches.   Psychiatric/Behavioral:  Negative for agitation. The patient is not nervous/anxious.            Past Medical History:   Diagnosis Date    General anesthetics causing adverse effect in therapeutic use     wakes up hyperventilating/chills    Goiter     Hashimoto's disease     Migraine     Osteopenia     Rectal mass     Ulcerative colitis     Urinary tract infection     Vitamin D deficiency      Past Surgical History:   Procedure Laterality Date    BUNIONECTOMY      right    BUNIONECTOMY Left 3/15/2024    Procedure: BUNIONECTOMY;  Surgeon: Ethan Vaugnh DPM;  Location: [facility];  Service: Podiatry;  Laterality: Left;    colon sx      COLONOSCOPY N/A 12/19/2016    Procedure: COLONOSCOPY;  Surgeon: Alli Peña,  MD;  Location: [facility] (4TH FLR);  Service: Endoscopy;  Laterality: N/A;    COLONOSCOPY N/A 1/7/2019    Procedure: COLONOSCOPY;  Surgeon: Vel Self MD;  Location: [facility] (4TH FLR);  Service: Endoscopy;  Laterality: N/A;  Pt requests Dr. Peña, Pt ok with Dr. Self    COLONOSCOPY N/A 2/13/2023    Procedure: COLONOSCOPY;  Surgeon: Srinivas Linares MD;  Location: [facility] (4TH FLR);  Service: Endoscopy;  Laterality: N/A;  instructions via portal -   pt request Sutab  [facility]gd [facility] to [facility]--1/17  Precall complete- [place]    f.e.s.s.  12/5/2014    FACIAL COSMETIC SURGERY      FLEXIBLE SIGMOIDOSCOPY N/A 3/19/2024    Procedure: SIGMOIDOSCOPY, FLEXIBLE;  Surgeon: Srinivas Linares MD;  Location: [facility] (4TH FLR);  Service: Endoscopy;  Laterality: N/A;  2/16 r/s due to a change in doctor's randall.,sent updated instr via portal.AC  Ref By: , instr sent via portal. AC  2/9-lvm for precall-MS  3/13-lvm for precall-MS  3/13-precall complete-Kpvt    FOOT HARDWARE REMOVAL Right 3/15/2024    Procedure: REMOVAL, HARDWARE, FOOT;  Surgeon: Ethan Vaughn DPM;  Location: [facility];  Service: Podiatry;  Laterality: Right;    TOTAL ABDOMINAL HYSTERECTOMY  1994    [facility]      Patient Active Problem List   Diagnosis    Thyroid nodule    History of Hashimoto thyroiditis    Environmental allergies    History of colon polyps    Mild vitamin D deficiency    Rectal mass    Pulmonary nodules    Colon polyp    Other osteoporosis without current pathological fracture        Objective:      Physical Exam  Vitals and nursing note reviewed.   Constitutional:       Appearance: She is well-developed.   Eyes:      Pupils: Pupils are equal, round, and reactive to light.   Cardiovascular:      Rate and Rhythm: Normal rate and regular rhythm.      Heart sounds: Normal heart sounds.   Pulmonary:      Effort: Pulmonary effort is normal.      Breath sounds: Normal breath sounds.   Abdominal:      General: Bowel sounds are  normal.      Palpations: Abdomen is soft.   Musculoskeletal:         General: Normal range of motion.      Cervical back: Normal range of motion.   Skin:     General: Skin is warm and dry.   Neurological:      Mental Status: She is alert and oriented to person, place, and time.         Assessment:       Problem List Items Addressed This Visit    None  Visit Diagnoses       Acute bacterial sinusitis    -  Primary            Plan:         Nano was seen today for uri.    Diagnoses and all orders for this visit:    Acute bacterial sinusitis  -     doxycycline (VIBRA-TABS) 100 MG tablet; Take 1 tablet (100 mg total) by mouth 2 (two) times daily. for 7 days  Continue nirali Fountain MD   Internal Medicine   Primary Care

## 2024-05-29 ENCOUNTER — LAB VISIT (OUTPATIENT)
Dept: LAB | Facility: HOSPITAL | Age: 69
End: 2024-05-29
Attending: INTERNAL MEDICINE
Payer: MEDICARE

## 2024-05-29 DIAGNOSIS — K51.90 ULCERATIVE COLITIS WITHOUT COMPLICATIONS, UNSPECIFIED LOCATION: ICD-10-CM

## 2024-05-29 PROCEDURE — 83993 ASSAY FOR CALPROTECTIN FECAL: CPT | Performed by: INTERNAL MEDICINE

## 2024-06-03 ENCOUNTER — OFFICE VISIT (OUTPATIENT)
Dept: GASTROENTEROLOGY | Facility: CLINIC | Age: 69
End: 2024-06-03
Payer: MEDICARE

## 2024-06-03 VITALS
HEIGHT: 67 IN | SYSTOLIC BLOOD PRESSURE: 94 MMHG | RESPIRATION RATE: 18 BRPM | TEMPERATURE: 98 F | BODY MASS INDEX: 31.21 KG/M2 | WEIGHT: 198.88 LBS | DIASTOLIC BLOOD PRESSURE: 60 MMHG | OXYGEN SATURATION: 19 % | HEART RATE: 64 BPM

## 2024-06-03 DIAGNOSIS — K51.90 ULCERATIVE COLITIS WITHOUT COMPLICATIONS, UNSPECIFIED LOCATION: Primary | ICD-10-CM

## 2024-06-03 LAB — CALPROTECTIN STL-MCNT: 41.8 MCG/G

## 2024-06-03 PROCEDURE — 3074F SYST BP LT 130 MM HG: CPT | Mod: CPTII,S$GLB,, | Performed by: INTERNAL MEDICINE

## 2024-06-03 PROCEDURE — 3008F BODY MASS INDEX DOCD: CPT | Mod: CPTII,S$GLB,, | Performed by: INTERNAL MEDICINE

## 2024-06-03 PROCEDURE — G0010 ADMIN HEPATITIS B VACCINE: HCPCS | Mod: S$GLB,,, | Performed by: INTERNAL MEDICINE

## 2024-06-03 PROCEDURE — 99214 OFFICE O/P EST MOD 30 MIN: CPT | Mod: 25,S$GLB,, | Performed by: INTERNAL MEDICINE

## 2024-06-03 PROCEDURE — 90739 HEPB VACC 2/4 DOSE ADULT IM: CPT | Mod: S$GLB,,, | Performed by: INTERNAL MEDICINE

## 2024-06-03 PROCEDURE — 1159F MED LIST DOCD IN RCRD: CPT | Mod: CPTII,S$GLB,, | Performed by: INTERNAL MEDICINE

## 2024-06-03 PROCEDURE — 1160F RVW MEDS BY RX/DR IN RCRD: CPT | Mod: CPTII,S$GLB,, | Performed by: INTERNAL MEDICINE

## 2024-06-03 PROCEDURE — 1126F AMNT PAIN NOTED NONE PRSNT: CPT | Mod: CPTII,S$GLB,, | Performed by: INTERNAL MEDICINE

## 2024-06-03 PROCEDURE — 3288F FALL RISK ASSESSMENT DOCD: CPT | Mod: CPTII,S$GLB,, | Performed by: INTERNAL MEDICINE

## 2024-06-03 PROCEDURE — 1101F PT FALLS ASSESS-DOCD LE1/YR: CPT | Mod: CPTII,S$GLB,, | Performed by: INTERNAL MEDICINE

## 2024-06-03 PROCEDURE — 3078F DIAST BP <80 MM HG: CPT | Mod: CPTII,S$GLB,, | Performed by: INTERNAL MEDICINE

## 2024-06-03 RX ORDER — MESALAMINE 1000 MG/1
1000 SUPPOSITORY RECTAL NIGHTLY
Qty: 90 SUPPOSITORY | Refills: 2 | Status: SHIPPED | OUTPATIENT
Start: 2024-06-03

## 2024-06-03 RX ORDER — FLUTICASONE PROPIONATE 50 MCG
SPRAY, SUSPENSION (ML) NASAL
COMMUNITY
Start: 2024-05-24

## 2024-06-03 NOTE — PROGRESS NOTES
[facility]             Inflammatory Bowel Disease   Follow-up  Note              TODAY'S VISIT DATE:  6/3/2024    Chief Complaint:   Chief Complaint   Patient presents with    Ulcerative Colitis     PCP: Lauren Fountain    Previous History:  Nano Sosa is a 69 y.o. female with ulcerative colitis (unclear distribution, rectum/possible left colon) S/P TAMIS of rectal adenoma and sporadic colon adenomas, Hashimito's thyroiditis, migraines, osteoporosis.  She was doing well until 2004 when she had bloody diarrhea and had a colonoscopy and diagnosed with left sided ulcerative colitis and was prescribed oral steroids and a suppository though pt concerned about taking medicine and did not take this.  In 2004 in [place] she met with a holistic doctor and she was given a expensive/extensive regimen and it resolved all of her symptoms within 6-8 mos. She attributes her initial symptoms within 6-12 mos after increased stress related to take care of elderly parent.   She then was taken care of by Dr. Quesada at [facility] and transferred her care to [facility] in 2011 and saw Dr. Miller at which time she was having 2-3 BMs/d with occ BRB painless on TP.   Colonoscopy 11/2014 c/w localized mild inflammation with edema and granularity in the rectum with 3 cm rectal polyp 10 cm proximal to anus that was biopsied and c/w tubular adenoma and rectal biopsies c/w mild chronic active inflammation with remaind er of the colon and TI endoscopically normal. On 12/24/14, 7/20/16, Dr. Peña did transanal endoscopic rectal resection (TAMIS) of the polyp. Colonoscopy 12/19/16 showed 4 mm sigmoid tubular adenoma and 6 mm rectal tubular adenoma both removed completely with jumbo forceps. Remainder of the colon was normal though not biopsies taken. Colonoscopy 1/7/19 c/w 5 mm flat sigmoid tubular adenoma removed with hot snare,  cecum lipoma and post- polypectomy mid rectal scar. I met patient for first  time for open access surveillance colonoscopy 23 at which time there was some patchy erytehma with punctate hemorrhage in the distal 5 cm of the rectum, localized hemicircumferential scarring 10 cm from anal verge with remainder of the colon normal. There were 2 small diminutive polyps removed from the descending and ascending colon (tubular adenomas), TI normal. Rectal biopsies reactive/regenerative changes.    Interval History:  - current IBD meds: canasa supp qhs (started 3/20/24, forgot to take it for about 10 days in 2024 and then restarted)  - 2-4 loose to formed BMs/d, no blood/urgency/nocturnal BMs  - since starting canasa no longer having blood in stool, no urgency and consistency of stool also improved  - 2024 flu and sinusitis- completed antibiotics  - 3/19/24 colonoscopy: from anal verge to 5 cm there is moderate inflammation characterized by erythema, granularity, friability and mucoid exudate with small  ulcerations. From 5 cm to 20 cm there is some mucosal scarring with decreased vasculature. From 20 cm to the left transverse colon the mucosa is normal. Biopsies proximal rectum with mild architectural distortion, distal rectum with chronic proctitis with moderate to severe activity.   - stool calprotectin: 3/28/24 609, 24 41  - recent antibiotics and has cough  - NSAID use: No  - Narcotic use: No  - Alternative/complementary meds for IBD: No    Prior Pertinent Surgeries:   None    Pertinent Endoscopy/Imagin23 colonoscopy: some patchy erytehma with punctate hemorrhage in the distal 5 cm of the rectum, localized hemicircumferential scarring 10 cm from anal verge with remainder of the colon normal. There were 2 small diminutive polyps removed from the descending and ascending colon (tubular adenomas), TI normal. Rectal biopsies reactive/regenerative changes    Therapeutic Drug Monitoring Labs:  NA    Prior IBD Therapies:  Holistic treatments- specifics not know- effective      Vaccinations:  Lab Results   Component Value Date    HEPBSURFABQU Negative 03/07/2023    HEPBSURFABQU <3 03/07/2023     Lab Results   Component Value Date    HEPAIGG Reactive 03/07/2023     Lab Results   Component Value Date    VARICELLAZOS >4000.00 03/07/2023    VARICELLAINT Positive 03/07/2023     Lab Results   Component Value Date    MUMPSIGGSCRE 13.50 03/07/2023    MUMPSIGGINTE Positive 03/07/2023      Lab Results   Component Value Date    RUBEOLAIGGAN >300.00 03/07/2023    RUBEOLAINTER Positive 03/07/2023     Immunization History   Administered Date(s) Administered    COVID-19, MRNA, LN-S, PF (Pfizer) (Gray Cap) 05/25/2022    COVID-19, MRNA, LN-S, PF (Pfizer) (Purple Cap) 02/22/2021, 03/15/2021, 10/07/2021    COVID-19, mRNA, LNP-S, PF (Moderna 2023)Ages 12+ 12/20/2023    COVID-19, mRNA, LNP-S, bivalent booster, PF (PFIZER OMICRON) 10/06/2022    Influenza (FLUAD) - Quadrivalent - Adjuvanted - PF *Preferred* (65+) 10/12/2020, 10/07/2021, 10/06/2022, 10/18/2023    Influenza - Trivalent (ADULT) 01/29/2013    Influenza Split 01/29/2013    Pneumococcal Conjugate - 13 Valent 10/12/2020    Pneumococcal Polysaccharide - 23 Valent 05/18/2022    Tdap 02/29/2020    Zoster 12/29/2015    Zoster Recombinant 08/16/2022, 10/31/2022   Flu shot: recommended yearly   COVID vaccine/booster:  per CDC recommendations  RSV: recommended   Tetanus (Tdap):  every 10 years  PCV 20: 5/2027  HPV:  NA  Meningococcal:  NA  Hepatitis B: recommended     Review of Systems   Constitutional:  Negative for chills, fever and weight loss.   HENT:          No oral ulcers, dysphagia, oral thrush   Eyes:  Negative for blurred vision, pain and redness.   Respiratory:  Positive for cough. Negative for shortness of breath.    Cardiovascular:  Negative for chest pain.   Gastrointestinal:  Negative for abdominal pain, heartburn, nausea and vomiting.   Genitourinary:  Negative for dysuria and hematuria.   Musculoskeletal:  Negative for back pain and joint  "pain.   Skin:  Negative for rash.   Psychiatric/Behavioral:  Negative for depression. The patient is not nervous/anxious and does not have insomnia.      All Medical History/Surgical History/Family History/Social History/Allergies have been reviewed and updated in EMR    Outpatient Medications Marked as Taking for the 6/3/24 encounter (Office Visit) with Srinivas Linares MD   Medication Sig Dispense Refill    acetaminophen (TYLENOL) 325 MG tablet Take 2 tablets (650 mg total) by mouth every 6 (six) hours as needed for Pain. 30 tablet 0    b complex vitamins tablet Take 1 tablet by mouth once daily.      calcium carbonate (OS-CONRAD) 600 mg calcium (1,500 mg) Tab Take 600 mg by mouth once daily.      cholecalciferol, vitamin D3, (VITAMIN D3) 50 mcg (2,000 unit) Cap capsule Take 4,000 Units by mouth once daily.      diclofenac sodium (VOLTAREN) 1 % Gel Apply 2 g topically 4 (four) times daily. 100 g 2    ferrous sulfate (FEOSOL) 325 mg (65 mg iron) Tab tablet Take 1 tablet (325 mg total) by mouth once daily. 90 tablet 3    fluticasone propionate (FLONASE) 50 mcg/actuation nasal spray by Each Nostril route.      mesalamine (CANASA) 1000 MG Supp Place 1 suppository (1,000 mg total) rectally nightly. 30 suppository 1    multivitamin capsule Take 1 capsule by mouth once daily.       Current Facility-Administered Medications for the 6/3/24 encounter (Office Visit) with Srinivas Linares MD   Medication Dose Route Frequency Provider Last Rate Last Admin    [DISCONTINUED] LIDOcaine HCL 10 mg/ml (1%) injection 1 mL  1 mL Other 1 time in Clinic/HOD Vel Campos MD         Vital Signs:  BP 94/60 (BP Location: Right arm, Patient Position: Sitting, BP Method: Small (Automatic))   Pulse 64   Temp 97.9 °F (36.6 °C) (Temporal)   Resp 18   Ht 5' 7" (1.702 m)   Wt 90.2 kg (198 lb 13.7 oz)   SpO2 (!) 19%   BMI 31.15 kg/m²    Physical Exam  Cardiovascular:      Rate and Rhythm: Normal rate and regular rhythm.      Pulses: " "Normal pulses.      Heart sounds: Normal heart sounds. No murmur heard.  Pulmonary:      Effort: Pulmonary effort is normal. No respiratory distress.      Breath sounds: Normal breath sounds.   Abdominal:      General: Bowel sounds are normal. There is no distension.      Palpations: Abdomen is soft.      Tenderness: There is no abdominal tenderness.   Musculoskeletal:      Right lower leg: No edema.      Left lower leg: No edema.     Labs:   Lab Results   Component Value Date    CALPROTECTIN 41.8 05/29/2024     No results found for: "HEPBSAG", "HEPBCAB"  Lab Results   Component Value Date    TBGOLDPLUS Negative 03/07/2023     Lab Results   Component Value Date    AJRUMSDE60MT 48 03/07/2023    NKQMEGRT59 >2000 (H) 09/09/2023     Lab Results   Component Value Date    WBC 6.74 02/29/2024    HGB 12.6 02/29/2024    HCT 39.2 02/29/2024    MCV 96 02/29/2024     02/29/2024     Lab Results   Component Value Date    CREATININE 0.7 02/29/2024    ALBUMIN 4.0 02/29/2024    BILITOT 0.8 02/29/2024    ALKPHOS 62 02/29/2024    AST 17 02/29/2024    ALT 16 02/29/2024     Assessment/Plan:  Nano Sosa is a 69 y.o. female with ulcerative colitis (unclear distribution, rectum/possible left colon) S/P TAMIS of rectal adenoma and sporadic colon adenomas, Hashimito's thyroiditis, migraines, osteoporosis.     Patient had flare of UC with proctitis confirmed on flex sig and good response to canasa supp qhs.  She will decrease canasa NM to qod and maintain on this with close monitoring of stool calprotectin.     # Ulcerative colitis (unclear distribution, rectum/possible left colon) S/P TAMIS of rectal adenoma and sporadic colon adenomas    - decrease canasa to NM qod  - flex sig 2/2025  - colonoscopy 2/2026  - stool calprotectin 1 month from now and again in 3 mos  - drug monitoring labs: CBC/CMP yearly (2/2025- gets with PCP), TB quantiferon (neg 3/2023), Hep B testing (HBsAg, HBtotalcoreAb-never done)    # Colon polyps:  - " high risk CRC   - pt had dysplasia in setting of active colitis removed in the past as well as sporadic adenomas  - flex sig yearly   - colonoscopy q 3-5 years    # IBD specific health maintenance:  Skin exam- yearly  Risk for osteopenia/osteoporosis- postmenopausal  Pap smear- defer to GYN  Vitamin D- normal, takes vit D3 4000 IU/d  Vaccines- RSV vaccine recommended, Hep B series to start with our clinic today and will check immunity at a later date    Visit today is associated with current or anticipated ongoing medical care related to this patient's single serious condition/complex condition ulcerative colitis .     Follow up in about 7 months (around 1/3/2025) for in person.    Srinivas Linares MD  Department of Gastroenterology  Medical Director, Inflammatory Bowel Disease

## 2024-06-03 NOTE — PATIENT INSTRUCTIONS
- please get your RSV vaccine  - hep B #1 and hep B #2 in 1 month   - decrease canasa to every other day   - stool calprotectin 1 month, 3 mos

## 2024-06-10 ENCOUNTER — PATIENT MESSAGE (OUTPATIENT)
Dept: PODIATRY | Facility: CLINIC | Age: 69
End: 2024-06-10
Payer: MEDICARE

## 2024-06-10 ENCOUNTER — PATIENT MESSAGE (OUTPATIENT)
Dept: INTERNAL MEDICINE | Facility: CLINIC | Age: 69
End: 2024-06-10
Payer: MEDICARE

## 2024-06-13 ENCOUNTER — OFFICE VISIT (OUTPATIENT)
Dept: PODIATRY | Facility: CLINIC | Age: 69
End: 2024-06-13
Payer: MEDICARE

## 2024-06-13 VITALS — BODY MASS INDEX: 31.15 KG/M2 | WEIGHT: 198.44 LBS | HEIGHT: 67 IN

## 2024-06-13 DIAGNOSIS — G89.18 POST-OP PAIN: Primary | ICD-10-CM

## 2024-06-13 DIAGNOSIS — M21.619 BUNION OF GREAT TOE: ICD-10-CM

## 2024-06-13 PROCEDURE — 1159F MED LIST DOCD IN RCRD: CPT | Mod: CPTII,S$GLB,, | Performed by: PODIATRIST

## 2024-06-13 PROCEDURE — 1160F RVW MEDS BY RX/DR IN RCRD: CPT | Mod: CPTII,S$GLB,, | Performed by: PODIATRIST

## 2024-06-13 PROCEDURE — 3288F FALL RISK ASSESSMENT DOCD: CPT | Mod: CPTII,S$GLB,, | Performed by: PODIATRIST

## 2024-06-13 PROCEDURE — 1101F PT FALLS ASSESS-DOCD LE1/YR: CPT | Mod: CPTII,S$GLB,, | Performed by: PODIATRIST

## 2024-06-13 PROCEDURE — 99999 PR PBB SHADOW E&M-EST. PATIENT-LVL III: CPT | Mod: PBBFAC,,, | Performed by: PODIATRIST

## 2024-06-13 PROCEDURE — 99024 POSTOP FOLLOW-UP VISIT: CPT | Mod: S$GLB,,, | Performed by: PODIATRIST

## 2024-06-13 PROCEDURE — 1126F AMNT PAIN NOTED NONE PRSNT: CPT | Mod: CPTII,S$GLB,, | Performed by: PODIATRIST

## 2024-06-13 NOTE — PROGRESS NOTES
Patient presents status post bunionectomy procedure.  She is doing well.  Mild to minimal discomfort.  Incision healing well.  No signs of infection or dehiscence.  Neurovascular status intact.  Good range of motion noted.  Good anatomical alignment surgical sites.  Radiographs ordered.  Continue transitioning normal activity.  Begin physical therapy.  Follow-up as needed.

## 2024-06-18 ENCOUNTER — CLINICAL SUPPORT (OUTPATIENT)
Dept: REHABILITATION | Facility: HOSPITAL | Age: 69
End: 2024-06-18
Attending: PODIATRIST
Payer: MEDICARE

## 2024-06-18 ENCOUNTER — PATIENT MESSAGE (OUTPATIENT)
Dept: PODIATRY | Facility: CLINIC | Age: 69
End: 2024-06-18
Payer: MEDICARE

## 2024-06-18 DIAGNOSIS — R26.2 DIFFICULTY WALKING: ICD-10-CM

## 2024-06-18 DIAGNOSIS — M21.619 BUNION OF GREAT TOE: ICD-10-CM

## 2024-06-18 DIAGNOSIS — G89.18 POST-OP PAIN: ICD-10-CM

## 2024-06-18 DIAGNOSIS — M25.675 JOINT STIFFNESS OF LEFT FOOT: Primary | ICD-10-CM

## 2024-06-18 PROCEDURE — 97161 PT EVAL LOW COMPLEX 20 MIN: CPT | Mod: PO

## 2024-06-18 PROCEDURE — 97112 NEUROMUSCULAR REEDUCATION: CPT | Mod: PO

## 2024-06-18 PROCEDURE — 97530 THERAPEUTIC ACTIVITIES: CPT | Mod: PO

## 2024-06-18 PROCEDURE — 97140 MANUAL THERAPY 1/> REGIONS: CPT | Mod: PO

## 2024-06-18 NOTE — PLAN OF CARE
[facility]   Physical Therapy Initial Evaluation      Name: Nano Sosa  Clinic Number: [id number]    Therapy Diagnosis:   Encounter Diagnoses   Name Primary?    Joint stiffness of left foot Yes    Difficulty walking     Post-op pain     Bunion of great toe         Physician: Ethan Vaughn DPM    Physician Orders: PT Eval and Treat  Medical Diagnosis from Referral:   G89.18 (ICD-10-CM) - Post-op pain   M21.619 (ICD-10-CM) - Bunion of great toe   Evaluation Date: 6/18/2024  Authorization Period Expiration: 6/13/2025  Plan of Care Expiration: 9/10/2024  Visit # / Visits authorized: 1/ 1      Foto  Date  Score    #1/3 6/18/2024 63%   #2/3     #3/3          Precautions: Standard and Osteopenia and Hashimo's Disease    Time In: 1000  Time Out: 1100  Total Appointment Time (timed & untimed codes): 60 minutes    SUBJECTIVE   Date of onset: 3/15/2024    History of current condition - Nano presents to the clinic status-post left great toe and Tailor's bunionectomy performed on 3/15/2024 by Dr. Ethan Vaughn. Feels like she slaps her foot when walking barefoot. Has noticed some increased left knee pain. Notices bilateral foot hand hand paresthesias following prolonged sitting which can occur almost daily. She denies any foot pain, but complains of mainly stiffness and decreased control. No complaints of swelling.    Falls: December 2023 - left patellar fracture (vertical - non-op)    Imaging, x-ray (5/1/2024):  Left: There are postoperative changes of the distal 1st metatarsal, proximal phalanx of the great toe and distal 5th metatarsal.  Hardware is intact.  There is osseous demineralization.  Mild degenerative change of the 1st metatarsal-phalangeal joint space.  There is plantar calcaneal spurring.  Right: There are surgical changes of the 1st metatarsal with intact hardware.  Degenerative change of the 1st and 2nd metatarsal phalangeal joint noted.  Fusion of the proximal  interphalangeal joint of the 2nd toe.  Osseous demineralization.  Plantar calcaneal spurring.  Degenerative change of the ankle.    Prior Therapy: None  Social History: 2-story house; lives with an adult   Occupation: Retired  Prior Level of Function: no pain or difficulties with walking and activities of daily living.  Current Level of Function: mild to moderate difficulties with walking and activities of daily living.     Pain:  Current 0/10, worst 1/10, best 0/10   Location: Ball of left foot  Description: stiff and decreased stability  Aggravating Factors: Walking  Easing Factors: rest    Patients goals: Normalizing gait.     Medical History:   Past Medical History:   Diagnosis Date    General anesthetics causing adverse effect in therapeutic use     wakes up hyperventilating/chills    Goiter     Hashimoto's disease     Migraine     Osteopenia     Rectal mass     Ulcerative colitis     Urinary tract infection     Vitamin D deficiency        Surgical History:   Nano Sosa  has a past surgical history that includes Facial cosmetic surgery; Bunionectomy; f.e.s.s. (12/5/2014); colon sx; Colonoscopy (N/A, 12/19/2016); Total abdominal hysterectomy (1994); Colonoscopy (N/A, 1/7/2019); Colonoscopy (N/A, 2/13/2023); Bunionectomy (Left, 3/15/2024); Foot hardware removal (Right, 3/15/2024); and Flexible sigmoidoscopy (N/A, 3/19/2024).    Medications:   Nano has a current medication list which includes the following prescription(s): acetaminophen, b complex vitamins, calcium carbonate, cholecalciferol (vitamin d3), diclofenac sodium, ferrous sulfate, fluticasone propionate, mesalamine, and multivitamin.    Allergies:   Review of patient's allergies indicates:   Allergen Reactions    Penicillins Shortness Of Breath     Throat swelling    Poison ivy extract Dermatitis, Hives, Itching, Rash and Swelling    Poison sumac extract Blisters, Dermatitis, Hives, Itching, Rash and Swelling    Sulfa (sulfonamide  antibiotics) Hives and Rash    Codeine Itching     Hallucinations, sweats    Erythromycin Itching and Swelling    Tessalon [benzonatate] Swelling    Unable to assess      TUNA--Flushing of skin        OBJECTIVE     Observations:  Cervical: forward head and rounded shoulders  Thoracic: increased kyphosis  Lumbar: reduced lordosis  Pelvis: no pelvic asymmetry or leg length difference      Gait:  Decreased left great toe extension  Left lateral trunk lean      Active Range of Motion:  Foot and Ankle   Action Left Right   Dorsiflexion 8 degrees 20 degrees   Plantarflexion 50 degrees 45 degrees   Inversion 38 degrees 35 degrees   Eversion 8 degrees 24 degrees   Great Toe Extension 40 degrees 55 degrees       Passive Range of Motion:  Foot and Ankle   Action Left Right   Dorsiflexion 12 degrees 22 degrees   Plantarflexion 52 degrees 50 degrees   Inversion 42 degrees 40 degrees   Eversion 20 degrees 30 degrees   Great Toe Extension 42 degrees 62 degrees       Manual Muscle Testing:  Foot and Ankle   Action Left Right   Hip Abduction 3+ / 5 4- / 5   Dorsiflexion 5 / 5 5 / 5   Plantarflexion 3 / 5 3 / 5   Inversion 5 / 5 5 / 5   Eversion 4+ / 5 5 / 5   Great Toe Extension 4+ / 5 4 / 5   Lumbricales 3 / 5 3 / 5       Special Tests:  Single Leg Calf Raises:  Right = 7 reps  Left = 3 reps  5 Time Sit to Stand = 8.12 seconds  Timed Up and Go = 5.79 seconds      Balance:  Single Limb Stance:  Left / Eyes Open / Firm = 3 seconds  Right / Eyes Open / Firm = 5 seconds      Palpation:  Hypomobile left midfoot and great toe extension      Intake Outcome Measure for FOTO Foot Survey    Therapist reviewed FOTO scores for Nano Sosa on 6/18/2024.   FOTO documents entered into EPIC - see Media section.    Intake Score: 63%           TREATMENT     Total Treatment time (time-based codes) separate from Evaluation: 28 minutes      Nano received the treatments listed below:      Neuromuscular Re-education activities to improve:  Balance, Coordination, Proprioception, and Motor Control for 10 minutes. The following activities were included:  Heel raises  Towel Crunches  Toe Yoga (standing)  Foot Doming (standing)  Clamshells - green theraband      Manual Therapy Techniques: Joint mobilizations were applied to the: left foot for 10 minutes, including:  Talocrural distraction - grade V  Mid foot distraction - grade V  Intermediate cuneiform mobilization - grade V  Great toe extension mobilization - grade III-IV      Therapeutic Activities to improve functional performance for 10 minutes, including:  Education (see below)  Questions and answers    PATIENT EDUCATION AND HOME EXERCISES     Education provided:   Anatomy and Pathology.  Symptom management and plan of care progressions.  Home Exercise Program.    Written Home Exercises Provided: yes. Exercises were reviewed and Nano was able to demonstrate them prior to the end of the session.  Nano demonstrated good  understanding of the education provided. See EMR under Patient Instructions for exercises provided during therapy sessions.    ASSESSMENT     Nano is a 69 y.o. female referred to outpatient Physical Therapy with a medical diagnosis of Bunion of great toe and post-op pain. Patient presents with decreased range of motion, decreased strength, gait deviations, joint hypomobility, and decreased balance. Signs and symptoms are consistent with the above medical procedure with secondary deficits resulting from prolonged immobilization. She will benefit from skilled physical therapy addressing her joint mobility, foot intrinsic strengthening, and lower extremity proprioceptive retraining.    Patient prognosis is Good.   Patientt will benefit from skilled outpatient Physical Therapy to address the deficits stated above and in the chart below, provide patient /family education, and to maximize patientt's level of independence.     Plan of care discussed with patient: Yes  Patient's spiritual,  cultural and educational needs considered and patient is agreeable to the plan of care and goals as stated below:     Anticipated Barriers for therapy: None    Medical Necessity is demonstrated by the following  History  Co-morbidities and personal factors that may impact the plan of care [] LOW: no personal factors / co-morbidities  [x] MODERATE: 1-2 personal factors / co-morbidities  [] HIGH: 3+ personal factors / co-morbidities    Moderate / High Support Documentation:   Co-morbidities affecting plan of care: Osteopenia, Hashimo's Disease,    Personal Factors:   no deficits     Examination  Body Structures and Functions, activity limitations and participation restrictions that may impact the plan of care [] LOW: addressing 1-2 elements  [] MODERATE: 3+ elements  [x] HIGH: 4+ elements (please support below)    Moderate / High Support Documentation: walking, stairs, transfers, recreation, house work     Clinical Presentation [x] LOW: stable  [] MODERATE: Evolving  [] HIGH: Unstable     Decision Making/ Complexity Score: low     Goals:  Short Term Goals: 6 weeks   Patient will have reduced pain complaints from 3/10 to less than or equal to 0/10. (Not Met - Progressing)  Patient will demonstrate increased AROM/PROM by approximately 25% to 50% of initial measurements. (Not Met - Progressing)  Patient will demonstrate increased muscle strength of at least one-half grade as compared to the initial measurements. (Not Met - Progressing)    Long Term Goals: 12 weeks   Patient will be able to ambulate to 5 minutes or more with normal mechanics and no pain. (Not Met - Progressing)  Patient will demonstrate improved single leg heel raise test to greater than or equal to 15 reps bilaterally. (Not Met - Progressing)  Patient will demonstrate increased muscle strength of at least one grade as compared to the initial measurements. (Not Met - Progressing)  Patient will improve Foot FOTO Intake score from 63% to greater than or  equal to 78%. (Not Met - Progressing)  Patient will be independent with their home exercise program. (Not Met - Progressing)    PLAN   Plan of care Certification: 6/18/2024 to 9/10/2024.    Outpatient Physical Therapy 1 times weekly for 12 weeks to include the following interventions: Gait Training, Manual Therapy, Neuromuscular Re-ed, Orthotic Management and Training, Patient Education, Self Care, Therapeutic Activities, and Therapeutic Exercise.     Jacky Lane, PT, DPT, OCS

## 2024-07-01 ENCOUNTER — CLINICAL SUPPORT (OUTPATIENT)
Dept: GASTROENTEROLOGY | Facility: CLINIC | Age: 69
End: 2024-07-01
Payer: MEDICARE

## 2024-07-01 DIAGNOSIS — K51.90 ULCERATIVE COLITIS WITHOUT COMPLICATIONS, UNSPECIFIED LOCATION: ICD-10-CM

## 2024-07-01 PROCEDURE — 90739 HEPB VACC 2/4 DOSE ADULT IM: CPT | Mod: S$GLB,,, | Performed by: INTERNAL MEDICINE

## 2024-07-01 PROCEDURE — G0010 ADMIN HEPATITIS B VACCINE: HCPCS | Mod: S$GLB,,, | Performed by: INTERNAL MEDICINE

## 2024-07-01 NOTE — PROGRESS NOTES
Nano presented ambulatory. Reviewed questionnaire specific to Hepatitis B vaccine, no positive exclusions noted. Vaccination given without incidence. Nano was escorted ambulatory to the Waiting Area and instructed to wait for 15 min and may leave as long as no adverse reaction noted. She states understanding and agrees.

## 2024-07-16 ENCOUNTER — PATIENT MESSAGE (OUTPATIENT)
Dept: INTERNAL MEDICINE | Facility: CLINIC | Age: 69
End: 2024-07-16
Payer: MEDICARE

## 2024-07-18 ENCOUNTER — HOSPITAL ENCOUNTER (OUTPATIENT)
Dept: RADIOLOGY | Facility: HOSPITAL | Age: 69
Discharge: HOME | End: 2024-07-18
Attending: INTERNAL MEDICINE
Payer: MEDICARE

## 2024-07-18 ENCOUNTER — OFFICE VISIT (OUTPATIENT)
Dept: INTERNAL MEDICINE | Facility: CLINIC | Age: 69
End: 2024-07-18
Payer: MEDICARE

## 2024-07-18 VITALS
HEART RATE: 65 BPM | DIASTOLIC BLOOD PRESSURE: 62 MMHG | SYSTOLIC BLOOD PRESSURE: 110 MMHG | BODY MASS INDEX: 32.11 KG/M2 | OXYGEN SATURATION: 97 % | HEIGHT: 67 IN | WEIGHT: 204.56 LBS

## 2024-07-18 DIAGNOSIS — G62.9 PERIPHERAL POLYNEUROPATHY: Primary | ICD-10-CM

## 2024-07-18 DIAGNOSIS — E66.9 CLASS 1 OBESITY WITHOUT SERIOUS COMORBIDITY WITH BODY MASS INDEX (BMI) OF 32.0 TO 32.9 IN ADULT, UNSPECIFIED OBESITY TYPE: ICD-10-CM

## 2024-07-18 DIAGNOSIS — G62.9 PERIPHERAL POLYNEUROPATHY: ICD-10-CM

## 2024-07-18 DIAGNOSIS — R79.89 OTHER SPECIFIED ABNORMAL FINDINGS OF BLOOD CHEMISTRY: ICD-10-CM

## 2024-07-18 DIAGNOSIS — R30.0 DYSURIA: ICD-10-CM

## 2024-07-18 DIAGNOSIS — G60.3 IDIOPATHIC PROGRESSIVE NEUROPATHY: ICD-10-CM

## 2024-07-18 DIAGNOSIS — D64.9 ANEMIA, UNSPECIFIED TYPE: ICD-10-CM

## 2024-07-18 LAB
BILIRUB UR QL STRIP: NEGATIVE
CLARITY UR REFRACT.AUTO: CLEAR
COLOR UR AUTO: YELLOW
GLUCOSE UR QL STRIP: NEGATIVE
HGB UR QL STRIP: NEGATIVE
KETONES UR QL STRIP: NEGATIVE
LEUKOCYTE ESTERASE UR QL STRIP: NEGATIVE
NITRITE UR QL STRIP: NEGATIVE
PH UR STRIP: 6 [PH] (ref 5–8)
PROT UR QL STRIP: NEGATIVE
SP GR UR STRIP: 1.01 (ref 1–1.03)
URN SPEC COLLECT METH UR: NORMAL

## 2024-07-18 PROCEDURE — 3044F HG A1C LEVEL LT 7.0%: CPT | Mod: CPTII,S$GLB,, | Performed by: INTERNAL MEDICINE

## 2024-07-18 PROCEDURE — 3288F FALL RISK ASSESSMENT DOCD: CPT | Mod: CPTII,S$GLB,, | Performed by: INTERNAL MEDICINE

## 2024-07-18 PROCEDURE — 99999 PR PBB SHADOW E&M-EST. PATIENT-LVL IV: CPT | Mod: PBBFAC,,, | Performed by: INTERNAL MEDICINE

## 2024-07-18 PROCEDURE — 3078F DIAST BP <80 MM HG: CPT | Mod: CPTII,S$GLB,, | Performed by: INTERNAL MEDICINE

## 2024-07-18 PROCEDURE — 81003 URINALYSIS AUTO W/O SCOPE: CPT | Performed by: INTERNAL MEDICINE

## 2024-07-18 PROCEDURE — 72100 X-RAY EXAM L-S SPINE 2/3 VWS: CPT | Mod: 26,,, | Performed by: RADIOLOGY

## 2024-07-18 PROCEDURE — 3074F SYST BP LT 130 MM HG: CPT | Mod: CPTII,S$GLB,, | Performed by: INTERNAL MEDICINE

## 2024-07-18 PROCEDURE — 3008F BODY MASS INDEX DOCD: CPT | Mod: CPTII,S$GLB,, | Performed by: INTERNAL MEDICINE

## 2024-07-18 PROCEDURE — 99214 OFFICE O/P EST MOD 30 MIN: CPT | Mod: S$GLB,,, | Performed by: INTERNAL MEDICINE

## 2024-07-18 PROCEDURE — 1101F PT FALLS ASSESS-DOCD LE1/YR: CPT | Mod: CPTII,S$GLB,, | Performed by: INTERNAL MEDICINE

## 2024-07-18 PROCEDURE — 72100 X-RAY EXAM L-S SPINE 2/3 VWS: CPT | Mod: TC

## 2024-07-18 NOTE — PROGRESS NOTES
Subjective:       Patient ID: aNno Sosa is a 69 y.o. female.    Chief Complaint: COVID-19 Post Vaccine Symptoms    HPI    Presents for evaluation of lower extremity numbness. Occurs in bilateral feet and extends to bilateral shins. She states this all started after she had covid in summer 2023 Occasionally felt in the fingertips. Described as a lack of feeling. Does not feel unsteady on her feet when it occurs. No tingling/burning.     Also having burning with urination.     PMHx:  Osteoporosis not currently on medications.     Health Maintenance:  Colon Cancer Screening: Colonoscopy in February 2023 with polyps removed. Due again 2028  Mammogram: Due in November. Order today   Hep C: negative 2023   Lipids: normal 2023   Vaccines:  up to date     Review of Systems   Constitutional:  Negative for activity change, appetite change and chills.   HENT:  Negative for ear pain, sinus pressure/congestion and sneezing.    Respiratory:  Negative for cough and shortness of breath.    Cardiovascular:  Negative for chest pain, palpitations and leg swelling.   Gastrointestinal:  Negative for abdominal distention, abdominal pain, constipation, diarrhea, nausea and vomiting.   Genitourinary:  Negative for dysuria and hematuria.   Musculoskeletal:  Negative for arthralgias, back pain and myalgias.   Neurological:  Negative for dizziness and headaches.   Psychiatric/Behavioral:  Negative for agitation. The patient is not nervous/anxious.            Past Medical History:   Diagnosis Date    General anesthetics causing adverse effect in therapeutic use     wakes up hyperventilating/chills    Goiter     Hashimoto's disease     Migraine     Osteopenia     Rectal mass     Ulcerative colitis     Urinary tract infection     Vitamin D deficiency      Past Surgical History:   Procedure Laterality Date    BUNIONECTOMY      right    BUNIONECTOMY Left 3/15/2024    Procedure: BUNIONECTOMY;  Surgeon: Ethan Vaughn DPM;  Location:  [facility] OR;  Service: Podiatry;  Laterality: Left;    colon sx      COLONOSCOPY N/A 12/19/2016    Procedure: COLONOSCOPY;  Surgeon: Alli Peña MD;  Location: [facility] ENDO (4TH FLR);  Service: Endoscopy;  Laterality: N/A;    COLONOSCOPY N/A 1/7/2019    Procedure: COLONOSCOPY;  Surgeon: Vel Self MD;  Location: [facility] ENDO (4TH FLR);  Service: Endoscopy;  Laterality: N/A;  Pt requests Dr. Peña, Pt ok with Dr. Self    COLONOSCOPY N/A 2/13/2023    Procedure: COLONOSCOPY;  Surgeon: Srinivas Linares MD;  Location: [facility] ENDO (4TH FLR);  Service: Endoscopy;  Laterality: N/A;  instructions via portal -   pt request Sutab  chgd from [facility] to [facility]--1/17  Precall complete- [place]    f.e.s.s.  12/5/2014    FACIAL COSMETIC SURGERY      FLEXIBLE SIGMOIDOSCOPY N/A 3/19/2024    Procedure: SIGMOIDOSCOPY, FLEXIBLE;  Surgeon: Srinivas Linares MD;  [facility]  Service: Endoscopy;  Laterality: N/A;  2/16 r/s due to a change in doctor's randall.,sent updated instr via portal.AC  Ref By: , instr sent via portal. AC  2/9-lvm for precall-MS  3/13-lvm for precall-MS  3/13-precall complete-Kpvt    FOOT HARDWARE REMOVAL Right 3/15/2024    Procedure: REMOVAL, HARDWARE, FOOT;  Surgeon: Ethan Vaughn DPM;  Location: [facility] OR;  Service: Podiatry;  Laterality: Right;    TOTAL ABDOMINAL HYSTERECTOMY  1994    [facility]      Patient Active Problem List   Diagnosis    Thyroid nodule    History of Hashimoto thyroiditis    Environmental allergies    History of colon polyps    Mild vitamin D deficiency    Rectal mass    Pulmonary nodules    Colon polyp    Other osteoporosis without current pathological fracture    Joint stiffness of left foot    Difficulty walking        Objective:      Physical Exam  Constitutional:       Appearance: Normal appearance.   HENT:      Head: Normocephalic.      Right Ear: Tympanic membrane normal.      Left Ear: Tympanic membrane normal.      Nose: Nose normal.   Cardiovascular:       Rate and Rhythm: Normal rate and regular rhythm.      Pulses: Normal pulses.      Heart sounds: Normal heart sounds.   Pulmonary:      Effort: Pulmonary effort is normal.      Breath sounds: Normal breath sounds.   Abdominal:      General: Abdomen is flat. Bowel sounds are normal.      Palpations: Abdomen is soft.   Musculoskeletal:         General: Normal range of motion.      Cervical back: Normal range of motion and neck supple.   Skin:     General: Skin is warm and dry.   Neurological:      General: No focal deficit present.      Mental Status: She is alert and oriented to person, place, and time.   Psychiatric:         Mood and Affect: Mood normal.         Assessment:       Problem List Items Addressed This Visit    None        Plan:       Nano was seen today for covid-19 post vaccine symptoms.    Diagnoses and all orders for this visit:    Peripheral polyneuropathy  -     X-Ray Lumbar Spine AP And Lateral; Future  -     Ambulatory referral/consult to Neurology; Future  -     EMG W/ ULTRASOUND AND NERVE CONDUCTION TEST 2 Extremities; Future  Check B12 and folate today.  Check EMG and referral to neurology.     Class 1 obesity without serious comorbidity with body mass index (BMI) of 32.0 to 32.9 in adult, unspecified obesity type  -     Ambulatory referral/consult to [facility] ([facility]); Future  -     [facility]  -     [facility] ([facility]  Would like to work on diet and exercise.     Anemia, unspecified type  Check labs today     Dysuria  -     Urinalysis, Reflex to Urine Culture Urine, Clean Catch              Lauren Fountain MD   Internal Medicine   Primary Care

## 2024-07-19 ENCOUNTER — LAB VISIT (OUTPATIENT)
Dept: LAB | Facility: HOSPITAL | Age: 69
End: 2024-07-19
Payer: MEDICARE

## 2024-07-19 DIAGNOSIS — R79.89 OTHER SPECIFIED ABNORMAL FINDINGS OF BLOOD CHEMISTRY: ICD-10-CM

## 2024-07-19 DIAGNOSIS — G60.3 IDIOPATHIC PROGRESSIVE NEUROPATHY: ICD-10-CM

## 2024-07-19 DIAGNOSIS — E66.9 CLASS 1 OBESITY WITHOUT SERIOUS COMORBIDITY WITH BODY MASS INDEX (BMI) OF 32.0 TO 32.9 IN ADULT, UNSPECIFIED OBESITY TYPE: ICD-10-CM

## 2024-07-19 DIAGNOSIS — D64.9 ANEMIA, UNSPECIFIED TYPE: ICD-10-CM

## 2024-07-19 LAB
ALBUMIN SERPL BCP-MCNC: 3.8 G/DL (ref 3.5–5.2)
ALP SERPL-CCNC: 69 U/L (ref 55–135)
ALT SERPL W/O P-5'-P-CCNC: 15 U/L (ref 10–44)
ANION GAP SERPL CALC-SCNC: 7 MMOL/L (ref 8–16)
AST SERPL-CCNC: 18 U/L (ref 10–40)
BASOPHILS # BLD AUTO: 0.04 K/UL (ref 0–0.2)
BASOPHILS NFR BLD: 0.6 % (ref 0–1.9)
BILIRUB SERPL-MCNC: 0.6 MG/DL (ref 0.1–1)
BUN SERPL-MCNC: 21 MG/DL (ref 8–23)
CALCIUM SERPL-MCNC: 9.1 MG/DL (ref 8.7–10.5)
CHLORIDE SERPL-SCNC: 109 MMOL/L (ref 95–110)
CHOLEST SERPL-MCNC: 202 MG/DL (ref 120–199)
CHOLEST/HDLC SERPL: 2.8 {RATIO} (ref 2–5)
CO2 SERPL-SCNC: 25 MMOL/L (ref 23–29)
CREAT SERPL-MCNC: 0.8 MG/DL (ref 0.5–1.4)
DIFFERENTIAL METHOD BLD: ABNORMAL
EOSINOPHIL # BLD AUTO: 0.1 K/UL (ref 0–0.5)
EOSINOPHIL NFR BLD: 1.5 % (ref 0–8)
ERYTHROCYTE [DISTWIDTH] IN BLOOD BY AUTOMATED COUNT: 13 % (ref 11.5–14.5)
EST. GFR  (NO RACE VARIABLE): >60 ML/MIN/1.73 M^2
ESTIMATED AVG GLUCOSE: 103 MG/DL (ref 68–131)
FOLATE SERPL-MCNC: 12.9 NG/ML (ref 4–24)
GLUCOSE SERPL-MCNC: 84 MG/DL (ref 70–110)
HBA1C MFR BLD: 5.2 % (ref 4–5.6)
HCT VFR BLD AUTO: 38.7 % (ref 37–48.5)
HDLC SERPL-MCNC: 72 MG/DL (ref 40–75)
HDLC SERPL: 35.6 % (ref 20–50)
HGB BLD-MCNC: 12.3 G/DL (ref 12–16)
IMM GRANULOCYTES # BLD AUTO: 0.01 K/UL (ref 0–0.04)
IMM GRANULOCYTES NFR BLD AUTO: 0.1 % (ref 0–0.5)
LDLC SERPL CALC-MCNC: 114 MG/DL (ref 63–159)
LYMPHOCYTES # BLD AUTO: 1.7 K/UL (ref 1–4.8)
LYMPHOCYTES NFR BLD: 24.7 % (ref 18–48)
MCH RBC QN AUTO: 30.8 PG (ref 27–31)
MCHC RBC AUTO-ENTMCNC: 31.8 G/DL (ref 32–36)
MCV RBC AUTO: 97 FL (ref 82–98)
MONOCYTES # BLD AUTO: 0.4 K/UL (ref 0.3–1)
MONOCYTES NFR BLD: 6 % (ref 4–15)
NEUTROPHILS # BLD AUTO: 4.5 K/UL (ref 1.8–7.7)
NEUTROPHILS NFR BLD: 67.1 % (ref 38–73)
NONHDLC SERPL-MCNC: 130 MG/DL
NRBC BLD-RTO: 0 /100 WBC
PLATELET # BLD AUTO: 237 K/UL (ref 150–450)
PMV BLD AUTO: 11 FL (ref 9.2–12.9)
POTASSIUM SERPL-SCNC: 4.3 MMOL/L (ref 3.5–5.1)
PROT SERPL-MCNC: 7.2 G/DL (ref 6–8.4)
RBC # BLD AUTO: 3.99 M/UL (ref 4–5.4)
SODIUM SERPL-SCNC: 141 MMOL/L (ref 136–145)
T4 FREE SERPL-MCNC: 0.86 NG/DL (ref 0.71–1.51)
TRIGL SERPL-MCNC: 80 MG/DL (ref 30–150)
TSH SERPL DL<=0.005 MIU/L-ACNC: 1.88 UIU/ML (ref 0.4–4)
VIT B12 SERPL-MCNC: 673 PG/ML (ref 210–950)
WBC # BLD AUTO: 6.68 K/UL (ref 3.9–12.7)

## 2024-07-19 PROCEDURE — 80061 LIPID PANEL: CPT | Performed by: INTERNAL MEDICINE

## 2024-07-19 PROCEDURE — 84443 ASSAY THYROID STIM HORMONE: CPT | Performed by: INTERNAL MEDICINE

## 2024-07-19 PROCEDURE — 83036 HEMOGLOBIN GLYCOSYLATED A1C: CPT | Performed by: INTERNAL MEDICINE

## 2024-07-19 PROCEDURE — 36415 COLL VENOUS BLD VENIPUNCTURE: CPT | Performed by: INTERNAL MEDICINE

## 2024-07-19 PROCEDURE — 80053 COMPREHEN METABOLIC PANEL: CPT | Performed by: INTERNAL MEDICINE

## 2024-07-19 PROCEDURE — 82746 ASSAY OF FOLIC ACID SERUM: CPT | Performed by: INTERNAL MEDICINE

## 2024-07-19 PROCEDURE — 84439 ASSAY OF FREE THYROXINE: CPT | Performed by: INTERNAL MEDICINE

## 2024-07-19 PROCEDURE — 82607 VITAMIN B-12: CPT | Performed by: INTERNAL MEDICINE

## 2024-07-19 PROCEDURE — 85025 COMPLETE CBC W/AUTO DIFF WBC: CPT | Performed by: INTERNAL MEDICINE

## 2024-07-23 ENCOUNTER — PATIENT MESSAGE (OUTPATIENT)
Dept: GASTROENTEROLOGY | Facility: CLINIC | Age: 69
End: 2024-07-23
Payer: MEDICARE

## 2024-07-26 ENCOUNTER — TELEPHONE (OUTPATIENT)
Dept: VASCULAR SURGERY | Facility: CLINIC | Age: 69
End: 2024-07-26
Payer: MEDICARE

## 2024-07-26 NOTE — TELEPHONE ENCOUNTER
2nd voice mail left re: appt 8/12 needing to be r/s. I r/s pt to 9/4 and asked pt to call us directly if this needs to be changed.

## 2024-07-31 ENCOUNTER — OFFICE VISIT (OUTPATIENT)
Dept: URGENT CARE | Facility: CLINIC | Age: 69
End: 2024-07-31
Payer: MEDICARE

## 2024-07-31 VITALS
HEART RATE: 67 BPM | RESPIRATION RATE: 17 BRPM | HEIGHT: 67 IN | SYSTOLIC BLOOD PRESSURE: 120 MMHG | DIASTOLIC BLOOD PRESSURE: 75 MMHG | TEMPERATURE: 98 F | WEIGHT: 204.56 LBS | BODY MASS INDEX: 32.11 KG/M2 | OXYGEN SATURATION: 99 %

## 2024-07-31 DIAGNOSIS — S73.102A HIP SPRAIN, LEFT, INITIAL ENCOUNTER: Primary | ICD-10-CM

## 2024-07-31 DIAGNOSIS — M25.552 LEFT HIP PAIN: ICD-10-CM

## 2024-07-31 PROCEDURE — 73502 X-RAY EXAM HIP UNI 2-3 VIEWS: CPT | Mod: LT,S$GLB,, | Performed by: RADIOLOGY

## 2024-07-31 PROCEDURE — 99213 OFFICE O/P EST LOW 20 MIN: CPT | Mod: S$GLB,,,

## 2024-07-31 RX ORDER — TIZANIDINE 4 MG/1
4 TABLET ORAL EVERY 8 HOURS PRN
Qty: 30 TABLET | Refills: 0 | Status: SHIPPED | OUTPATIENT
Start: 2024-07-31 | End: 2024-07-31

## 2024-07-31 RX ORDER — TIZANIDINE 4 MG/1
4 TABLET ORAL EVERY 8 HOURS PRN
Qty: 30 TABLET | Refills: 0 | Status: SHIPPED | OUTPATIENT
Start: 2024-07-31 | End: 2024-08-10

## 2024-07-31 NOTE — PROGRESS NOTES
"Subjective:      Patient ID: Nano Sosa is a 69 y.o. female.    Vitals:  height is 5' 7" (1.702 m) and weight is 92.8 kg (204 lb 9.4 oz). Her oral temperature is 98.3 °F (36.8 °C). Her blood pressure is 120/75 and her pulse is 67. Her respiration is 17 and oxygen saturation is 99%.     Chief Complaint: Back Pain    Pt c/o L hip pain. Pain began this morning after missing a step on a ladder she landed on her left hip. Pt was seen at chiropractor and had the tens unit on location, and checked her mobility. Chiropractor recommended being see for muscle relaxer to avoid stiffness. She denies worsening of numbness, worsening of tingling, inability to bear weight, fever, chills, cp, sob.    Hip Pain   The incident occurred 12 to 24 hours ago. The incident occurred at home. Injury mechanism: slipped and fell on hip. The pain is present in the left hip. The quality of the pain is described as aching. The pain has been Constant since onset. Pertinent negatives include no inability to bear weight, loss of motion, loss of sensation, muscle weakness, numbness or tingling. She reports no foreign bodies present. The symptoms are aggravated by movement, palpation and weight bearing. Treatments tried: ice pack and TENS unit. The treatment provided mild relief.     Constitution: Negative for chills and fever.   Cardiovascular:  Negative for chest pain and sob on exertion.   Musculoskeletal:  Positive for pain, trauma, joint pain (left hip) and back pain. Negative for joint swelling and abnormal ROM of joint.   Neurological:  Negative for numbness and tingling.     Objective:     Physical Exam   Constitutional:  Non-toxic appearance. She does not appear ill. No distress.      Comments:Patient is in no acute distress, patient is non-toxic appearing, patient is ox3, patient is answering question appropriately.   normal  Abdominal: Normal appearance.   Musculoskeletal:      Right hip: Normal.      Left hip: She exhibits " tenderness. She exhibits normal range of motion, normal strength, no bony tenderness, no crepitus, no deformity and no laceration.        Legs:       Comments: 5/5 strength of LE. Full ROM, active and passive. Sensation intact. No midline tenderness. No paraspinal tenderness. No step off deformities. Patient is able to ambulate in exam room and throughout clinic. Blue represents area of tenderness to palpation.   Neurological: She is alert.   Skin: Skin is not diaphoretic.   Nursing note and vitals reviewed.    X-Ray Hip 2 or 3 views Left with Pelvis when performed    Result Date: 7/31/2024  EXAMINATION: XR HIP WITH PELVIS WHEN PERFORMED 2 OR 3 VIEWS LEFT CLINICAL HISTORY: Pain in left hip TECHNIQUE: AP view of the pelvis and frog leg lateral view of the left hip were performed. COMPARISON: None FINDINGS: Three views left hip. There is osteopenia.  There are degenerative changes of the bilateral sacroiliac joints, pubic symphysis, and bilateral femoroacetabular joints without dislocation.     1. No acute displaced fracture or dislocation of the left hip. Electronically signed by: Jac Fernando MD Date:    07/31/2024 Time:    18:14    Assessment:     1. Hip sprain, left, initial encounter    2. Left hip pain      Plan:   Previous notes reviewed.  Vital signs reviewed.  Labs ordered. Labs reviewed.  Discussed sprain of left hip, home care, tx options, and given follow up precautions.  Patient was briefed on my thought process and diagnosis.   Patient involved with the treatment plan and agreed to the plan.  Patient informed on warning signs, patient understood warning signs and to go to urgent care or ER if warning signs appear.  Please excuse grammatical/spelling errors appreciated throughout this visit encounter for a remote dictation device was used during this encounter.    Patient Instructions   Please drink plenty of fluids.  Please get plenty of rest.    Please return here or go to the Emergency Department  for any concerns or worsening of condition.    Please take TIZANIDINE every 8 hours as needed for muscle tension, be aware that this medication can make you drowsy. Do not drive or operate heavy machinery while taking.     Tylenol:  Regular strength can take 650 mg every 4-6 hours as needed, do not exceed 3,250 mg within a day.  Extra strength can take 1,000 mg every 6 hours as needed, do not exceed 3,000 mg in one day.    Rest, ice, compression and elevation to the affected joint or limb as needed.  Please follow up with your primary care doctor or specialist as needed.    If you  smoke, please stop smoking.    Hip sprain, left, initial encounter  -     Discontinue: tiZANidine (ZANAFLEX) 4 MG tablet; Take 1 tablet (4 mg total) by mouth every 8 (eight) hours as needed (muscle tension, muscle spasms, muscle pain).  Dispense: 30 tablet; Refill: 0  -     tiZANidine (ZANAFLEX) 4 MG tablet; Take 1 tablet (4 mg total) by mouth every 8 (eight) hours as needed (muscle tension, muscle spasms, muscle pain).  Dispense: 30 tablet; Refill: 0    Left hip pain  -     X-Ray Hip 2 or 3 views Left with Pelvis when performed; Future; Expected date: 07/31/2024      Kaleb Kessler PA-C

## 2024-07-31 NOTE — PATIENT INSTRUCTIONS
Please drink plenty of fluids.  Please get plenty of rest.    Please return here or go to the Emergency Department for any concerns or worsening of condition.    Please take TIZANIDINE every 8 hours as needed for muscle tension, be aware that this medication can make you drowsy. Do not drive or operate heavy machinery while taking.     Tylenol:  Regular strength can take 650 mg every 4-6 hours as needed, do not exceed 3,250 mg within a day.  Extra strength can take 1,000 mg every 6 hours as needed, do not exceed 3,000 mg in one day.    Rest, ice, compression and elevation to the affected joint or limb as needed.  Please follow up with your primary care doctor or specialist as needed.    If you  smoke, please stop smoking.

## 2024-08-12 ENCOUNTER — TELEPHONE (OUTPATIENT)
Dept: VASCULAR SURGERY | Facility: CLINIC | Age: 69
End: 2024-08-12

## 2024-08-12 NOTE — TELEPHONE ENCOUNTER
Returned pt call, 3rd voice mail w/direct number to clinic left informing pt that Dr. Campos is not in clinic today, I left 2 voicemails in July informing her that her appt was moved to  at 1:15 and left direct number to clinic for her to contact us directly if the new appt time does not work, and sent message in [employer].             Nano FOURNIERLobo Sosa   Sent:  11:15 AM   To: P [facility] Clinical Support Staff      Nano Sosa   MRN: [id number] : 1955   Pt Home: [phone]     Entered: [phone]        Message    Appointment Request From: Nano Sosa      With Provider: Vel Campos [[facility]]      Preferred Date Range: 2024 - 2024      Preferred Times: Any Time      Reason for visit: Office Visit      Comments:   I had a follow up appt with Dr. Campos in my visit list but it is missing. I had vein procedure  back in March. Can you please schedule?

## 2024-08-13 ENCOUNTER — PATIENT MESSAGE (OUTPATIENT)
Dept: GASTROENTEROLOGY | Facility: CLINIC | Age: 69
End: 2024-08-13
Payer: MEDICARE

## 2024-08-13 ENCOUNTER — PATIENT MESSAGE (OUTPATIENT)
Dept: INTERNAL MEDICINE | Facility: CLINIC | Age: 69
End: 2024-08-13
Payer: MEDICARE

## 2024-08-13 ENCOUNTER — OFFICE VISIT (OUTPATIENT)
Dept: ENDOCRINOLOGY | Facility: CLINIC | Age: 69
End: 2024-08-13
Payer: MEDICARE

## 2024-08-13 VITALS
HEART RATE: 68 BPM | BODY MASS INDEX: 30.73 KG/M2 | SYSTOLIC BLOOD PRESSURE: 120 MMHG | DIASTOLIC BLOOD PRESSURE: 64 MMHG | OXYGEN SATURATION: 98 % | WEIGHT: 196.19 LBS

## 2024-08-13 DIAGNOSIS — Z86.39 HISTORY OF HASHIMOTO THYROIDITIS: ICD-10-CM

## 2024-08-13 DIAGNOSIS — M81.0 POSTMENOPAUSAL OSTEOPOROSIS: ICD-10-CM

## 2024-08-13 DIAGNOSIS — E04.1 THYROID NODULE: ICD-10-CM

## 2024-08-13 DIAGNOSIS — M81.0 OSTEOPOROSIS, UNSPECIFIED OSTEOPOROSIS TYPE, UNSPECIFIED PATHOLOGICAL FRACTURE PRESENCE: Primary | ICD-10-CM

## 2024-08-13 PROCEDURE — 3044F HG A1C LEVEL LT 7.0%: CPT | Mod: CPTII,S$GLB,, | Performed by: INTERNAL MEDICINE

## 2024-08-13 PROCEDURE — 99214 OFFICE O/P EST MOD 30 MIN: CPT | Mod: GC,S$GLB,, | Performed by: INTERNAL MEDICINE

## 2024-08-13 PROCEDURE — 3078F DIAST BP <80 MM HG: CPT | Mod: CPTII,S$GLB,, | Performed by: INTERNAL MEDICINE

## 2024-08-13 PROCEDURE — 1159F MED LIST DOCD IN RCRD: CPT | Mod: CPTII,S$GLB,, | Performed by: INTERNAL MEDICINE

## 2024-08-13 PROCEDURE — 1100F PTFALLS ASSESS-DOCD GE2>/YR: CPT | Mod: CPTII,S$GLB,, | Performed by: INTERNAL MEDICINE

## 2024-08-13 PROCEDURE — 3288F FALL RISK ASSESSMENT DOCD: CPT | Mod: CPTII,S$GLB,, | Performed by: INTERNAL MEDICINE

## 2024-08-13 PROCEDURE — 3008F BODY MASS INDEX DOCD: CPT | Mod: CPTII,S$GLB,, | Performed by: INTERNAL MEDICINE

## 2024-08-13 PROCEDURE — 99999 PR PBB SHADOW E&M-EST. PATIENT-LVL IV: CPT | Mod: PBBFAC,,, | Performed by: INTERNAL MEDICINE

## 2024-08-13 PROCEDURE — 1125F AMNT PAIN NOTED PAIN PRSNT: CPT | Mod: CPTII,S$GLB,, | Performed by: INTERNAL MEDICINE

## 2024-08-13 PROCEDURE — 3074F SYST BP LT 130 MM HG: CPT | Mod: CPTII,S$GLB,, | Performed by: INTERNAL MEDICINE

## 2024-08-13 PROCEDURE — G2211 COMPLEX E/M VISIT ADD ON: HCPCS | Mod: S$GLB,,, | Performed by: INTERNAL MEDICINE

## 2024-08-13 RX ORDER — TERIPARATIDE 250 UG/ML
20 INJECTION, SOLUTION SUBCUTANEOUS DAILY
Qty: 2.4 ML | Refills: 11 | Status: ACTIVE | OUTPATIENT
Start: 2024-08-13 | End: 2025-08-13

## 2024-08-13 RX ORDER — PEN NEEDLE, DIABETIC 31 GX5/16"
NEEDLE, DISPOSABLE MISCELLANEOUS
Qty: 100 EACH | Refills: 3 | Status: ACTIVE | OUTPATIENT
Start: 2024-08-13

## 2024-08-13 NOTE — PROGRESS NOTES
Subjective     Chief Complaint: Follow-up visit    History of Present Illness:  Ms. Nano Sosa is a 69 y.o. white female with a significant medical hx of Hashimoto's thyroiditis, with thyroid nodules, vitamin-D deficiency, ulcerative colitis without complications, osteoporosis without pathological fractures, peripheral polyneuropathy, and a recent left hip sprain following a fall with no fractures.  She presents today for a follow-up visit.  She reports feeling generally well but notes persistent fatigue which has been for several years, there is no sensitivity to cold she is not on any thyroid medication, and her thyroid function tests are normal. Of note, she had a FNA and a DNA check of the thyroid tissue which made an impression of 1% chance of a malignancy.She has had a significant weight gain in the past couple of months which she attributes to the lack of exercise following her foot surgery for bunionectomy.  Ms. Sosa continues to take vitamin D supplements dosages, and she is compliant with supplementation regimen for calcium as well.  Her ulcerative colitis has been stable with no recent flare-ups.  She denies any gastrointestinal symptoms such as diarrhea, abdominal pain or bloody stool.  Her last colonoscopy was in February of 2023, with no granuloma, dysplasia, or malignancy, but with reactive/regenerative changes. She was diagnosed with Osteoporosis and is at a very high risk for fractures.  Left hip sprain occurred due to a minor fall at home she has been using acetaminophen and diclofenac for pain management which has provided some relief however she still experiences some pain and stiffness on long sitting and this is relieved by standing from the sitting position.  She was also diagnosed with peripheral neuropathy and experiences numbness in her feet sometimes she has been taking the vitamin B complex supplement.  She denies any side effects from current medications and reports good  adherence to her regimen.    Review of Systems   Constitutional:  Positive for malaise/fatigue (chronic but mild). Negative for chills, fever and weight loss (she has gained 30 Lbs).   Eyes:  Negative for blurred vision, double vision and redness.   Respiratory:  Negative for cough and shortness of breath.    Cardiovascular:  Negative for chest pain, palpitations, claudication and leg swelling.   Gastrointestinal:  Negative for abdominal pain, blood in stool, constipation, diarrhea, heartburn, melena, nausea and vomiting.   Genitourinary:  Negative for dysuria, flank pain and urgency.   Musculoskeletal:  Positive for joint pain (Lower back pain following a fall). Negative for myalgias.   Skin:  Negative for itching and rash.   Neurological:  Negative for dizziness, seizures and headaches.   Endo/Heme/Allergies:  Does not bruise/bleed easily.   Psychiatric/Behavioral: Negative.         PAST HISTORY:     Past Medical History:   Diagnosis Date    General anesthetics causing adverse effect in therapeutic use     wakes up hyperventilating/chills    Goiter     Hashimoto's disease     Migraine     Osteopenia     Rectal mass     Ulcerative colitis     Urinary tract infection     Vitamin D deficiency        Past Surgical History:   Procedure Laterality Date    BUNIONECTOMY      right    BUNIONECTOMY Left 3/15/2024    Procedure: BUNIONECTOMY;  Surgeon: Ethan Vaughn DPM;  [facility]: [facility];  Service: Podiatry;  Laterality: Left;    colon sx      COLONOSCOPY N/A 12/19/2016    Procedure: COLONOSCOPY;  Surgeon: Alli Peña MD;  Location: [address]);  Service: Endoscopy;  Laterality: N/A;    COLONOSCOPY N/A 1/7/2019    Procedure: COLONOSCOPY;  Surgeon: Vel Self MD;  Location: [address]);  Service: Endoscopy;  Laterality: N/A;  Pt requests Dr. Peña, Pt ok with Dr. Self    COLONOSCOPY N/A 2/13/2023    Procedure: COLONOSCOPY;  Surgeon: Srinivas Linares MD;  Location: [address]  FLR);  Service: Endoscopy;  Laterality: N/A;  instructions via portal -   pt request Sutab  chgd from [facility] to [facility]--  Precall complete- [place]    f.e.s.s.  2014    FACIAL COSMETIC SURGERY      FLEXIBLE SIGMOIDOSCOPY N/A 3/19/2024    Procedure: SIGMOIDOSCOPY, FLEXIBLE;  Surgeon: Srinivas Linares MD;  Location: [facility] (4TH FLR);  Service: Endoscopy;  Laterality: N/A;   r/s due to a change in doctor's randall.,sent updated instr via portal.  Ref By: , instr sent via portal. AC  -lvm for precall-MS  3/13-lvm for precall-MS  3/13-precall complete-Kpvt    FOOT HARDWARE REMOVAL Right 3/15/2024    Procedure: REMOVAL, HARDWARE, FOOT;  Surgeon: Ethan Vaughn DPM;  Location: [facility];  Service: Podiatry;  Laterality: Right;    TOTAL ABDOMINAL HYSTERECTOMY      [facility]       Family History   Problem Relation Name Age of Onset    Rheum arthritis Mother      Migraines Mother      Breast cancer Maternal Aunt      Heart disease Maternal Uncle      Hypertension Maternal Uncle      Colon cancer Maternal Aunt      Anesthesia problems Neg Hx      Diabetes Neg Hx      Ovarian cancer Neg Hx         Social History     Socioeconomic History    Marital status: Single   Tobacco Use    Smoking status: Former     Current packs/day: 0.00     Average packs/day: 1 pack/day for 30.0 years (30.0 ttl pk-yrs)     Types: Cigarettes     Start date: 1969     Quit date: 1999     Years since quittin.9     Passive exposure: Never    Smokeless tobacco: Never   Substance and Sexual Activity    Alcohol use: Yes     Alcohol/week: 6.0 standard drinks of alcohol     Types: 4 Glasses of wine, 2 Standard drinks or equivalent per week     Comment: weekends    Drug use: No    Sexual activity: Not Currently     Partners: Male     Birth control/protection: See Surgical Hx   Social History Narrative    Walks, does yoga and rides bike for exercise.      Social Determinants of Health     Financial Resource Strain: Low  Risk  (1/17/2024)    Overall Financial Resource Strain (CARDIA)     Difficulty of Paying Living Expenses: Not very hard   Food Insecurity: No Food Insecurity (1/17/2024)    Hunger Vital Sign     Worried About Running Out of Food in the Last Year: Never true     Ran Out of Food in the Last Year: Never true   Transportation Needs: No Transportation Needs (1/17/2024)    PRAPARE - Transportation     Lack of Transportation (Medical): No     Lack of Transportation (Non-Medical): No   Physical Activity: Sufficiently Active (1/17/2024)    Exercise Vital Sign     Days of Exercise per Week: 5 days     Minutes of Exercise per Session: 40 min   Stress: No Stress Concern Present (1/17/2024)    [employer] - Occupational Stress Questionnaire     Feeling of Stress : Only a little   Housing Stability: Low Risk  (1/17/2024)    Housing Stability Vital Sign     Unable to Pay for Housing in the Last Year: No     Number of Places Lived in the Last Year: 1     Unstable Housing in the Last Year: No       MEDICATIONS & ALLERGIES:     Current Outpatient Medications on File Prior to Visit   Medication Sig    acetaminophen (TYLENOL) 325 MG tablet Take 2 tablets (650 mg total) by mouth every 6 (six) hours as needed for Pain.    b complex vitamins tablet Take 1 tablet by mouth once daily.    calcium carbonate (OS-CONRAD) 600 mg calcium (1,500 mg) Tab Take 600 mg by mouth once daily.    cholecalciferol, vitamin D3, (VITAMIN D3) 50 mcg (2,000 unit) Cap capsule Take 4,000 Units by mouth once daily.    diclofenac sodium (VOLTAREN) 1 % Gel Apply 2 g topically 4 (four) times daily.    ferrous sulfate (FEOSOL) 325 mg (65 mg iron) Tab tablet Take 1 tablet (325 mg total) by mouth once daily.    fluticasone propionate (FLONASE) 50 mcg/actuation nasal spray by Each Nostril route.    mesalamine (CANASA) 1000 MG Supp Place 1 suppository (1,000 mg total) rectally nightly.    multivitamin capsule Take 1 capsule by mouth once daily.      No current facility-administered medications on file prior to visit.       Review of patient's allergies indicates:   Allergen Reactions    Penicillins Shortness Of Breath     Throat swelling    Poison ivy extract Dermatitis, Hives, Itching, Rash and Swelling    Poison sumac extract Blisters, Dermatitis, Hives, Itching, Rash and Swelling    Sulfa (sulfonamide antibiotics) Hives and Rash    Codeine Itching     Hallucinations, sweats    Erythromycin Itching and Swelling    Tessalon [benzonatate] Swelling    Unable to assess      TUNA--Flushing of skin       OBJECTIVE:     Vital Signs:  Vitals:    08/13/24 0900   BP: 120/64   Pulse: 68   SpO2: 98%   Weight: 89 kg (196 lb 3.4 oz)       Body mass index is 30.73 kg/m².     Physical Exam  Constitutional:       Appearance: Normal appearance. She is obese. She is not ill-appearing.   HENT:      Head: Normocephalic.      Right Ear: External ear normal.      Left Ear: External ear normal.   Eyes:      General: Lids are normal. No visual field deficit or scleral icterus.     Extraocular Movements: Extraocular movements intact.      Conjunctiva/sclera: Conjunctivae normal.      Right eye: Right conjunctiva is not injected.      Left eye: Left conjunctiva is not injected.   Neck:      Thyroid: No thyroid mass.   Cardiovascular:      Rate and Rhythm: Normal rate and regular rhythm.      Pulses: Normal pulses.   Pulmonary:      Effort: Pulmonary effort is normal.      Breath sounds: Normal breath sounds.   Abdominal:      General: Abdomen is flat.   Musculoskeletal:      Cervical back: Normal range of motion and neck supple. No rigidity or tenderness.   Lymphadenopathy:      Cervical: No cervical adenopathy.   Neurological:      Mental Status: She is alert.         Laboratory  No results found for this or any previous visit (from the past 24 hour(s)).    Diagnostic Results:      Health Maintenance Due   Topic Date Due    RSV Vaccine (Age 60+ and Pregnant patients) (1 - 1-dose  "60+ series) Never done    COVID-19 Vaccine (7 - 2023-24 season) 04/20/2024         ASSESSMENT & PLAN:         1. Osteoporosis, unspecified osteoporosis type, unspecified pathological fracture presence  -     Ambulatory referral/consult to Endocrinology  -     teriparatide (FORTEO) 20 mcg/dose (600mcg/2.4mL) PnIj; Inject 0.08 mLs (20 mcg total) into the skin once daily.  Dispense: 2.4 mL; Refill: 11  -     BD ULTRA-FINE POOJA PEN NEEDLE 32 gauge x 5/32" Ndle; For Forteo - inject daily  Dispense: 100 each; Refill: 3    2. Thyroid nodule  -     US Soft Tissue Head Neck; Future; Expected date: 08/13/2024    3. History of Hashimoto thyroiditis    TSH has remained normal - Continue periodic Thyroid function tests    RTC in 1 year    Discussed with Dr. Templeton - staff attestation to follow      Tanesha Gallardo MD  Internal Medicine PGY-1  [facility]  [address] [place] [address] [id number] [phone]    "

## 2024-08-13 NOTE — ASSESSMENT & PLAN NOTE
Risk factors include prior fractures,  race, menopause     RDA of calcium and Vitamin D discussed    Fall precautions emphasized  Weight bearing exercises encouraged    She is in the midst of having dental work completed.  She had a bone graft and is awaiting repeat dental exam to determine when she can have dental implants done.  Given that she is at very high fracture risk due to history of multiple fractures, I have suggested starting on Forteo to reduce fracture risk.  Forteo does not carry the risk for osteonecrosis of the jaw and may actually help with healing of the bone grafting.  Assuming it is covered through insurance, she is agreeable to proceed.  Discussed potential side effects related to Forteo.  Discussed that it would need to be followed by anti resorptive therapy, but that we can treat for up to two years with Forteo.    Follow-up in one year with repeat DXA to assess efficacy of Forteo.

## 2024-08-13 NOTE — ASSESSMENT & PLAN NOTE
Due for repeat thyroid ultrasound to assess for changes in the nodule.  Previous negative molecular markers is reassuring.

## 2024-08-13 NOTE — PROGRESS NOTES
I have seen the patient, reviewed the resident's history and physical, assessment, plan, and progress note. I have personally interviewed and examined the patient at bedside and agree with the findings.     History of Hashimoto thyroiditis  TSH remains normal on no thyroid medication. Monitor yearly.    Thyroid nodule  Due for repeat thyroid ultrasound to assess for changes in the nodule.  Previous negative molecular markers is reassuring.    Postmenopausal osteoporosis  Risk factors include prior fractures,  race, menopause     RDA of calcium and Vitamin D discussed    Fall precautions emphasized  Weight bearing exercises encouraged    She is in the midst of having dental work completed.  She had a bone graft and is awaiting repeat dental exam to determine when she can have dental implants done.  Given that she is at very high fracture risk due to history of multiple fractures, I have suggested starting on Forteo to reduce fracture risk.  Forteo does not carry the risk for osteonecrosis of the jaw and may actually help with healing of the bone grafting.  Assuming it is covered through insurance, she is agreeable to proceed.  Discussed potential side effects related to Forteo.  Discussed that it would need to be followed by anti resorptive therapy, but that we can treat for up to two years with Forteo.    Follow-up in one year with repeat DXA to assess efficacy of Forteo.        Garrick Templeton MD  Endocrinology Staff

## 2024-08-14 ENCOUNTER — PATIENT MESSAGE (OUTPATIENT)
Dept: INTERNAL MEDICINE | Facility: CLINIC | Age: 69
End: 2024-08-14
Payer: MEDICARE

## 2024-08-14 ENCOUNTER — TELEPHONE (OUTPATIENT)
Dept: INTERNAL MEDICINE | Facility: CLINIC | Age: 69
End: 2024-08-14
Payer: MEDICARE

## 2024-08-14 DIAGNOSIS — S32.040A COMPRESSION FRACTURE OF L4 LUMBAR VERTEBRA, CLOSED, INITIAL ENCOUNTER: Primary | ICD-10-CM

## 2024-08-14 DIAGNOSIS — M79.89 PAIN AND SWELLING OF LOWER EXTREMITY, UNSPECIFIED LATERALITY: Primary | ICD-10-CM

## 2024-08-14 DIAGNOSIS — M79.606 PAIN AND SWELLING OF LOWER EXTREMITY, UNSPECIFIED LATERALITY: Primary | ICD-10-CM

## 2024-08-14 NOTE — TELEPHONE ENCOUNTER
----- Message from Lauren Fountain MD sent at 8/14/2024  2:08 PM CDT -----  Can you help her schedule this xray for this afternoon. Thanks

## 2024-08-15 ENCOUNTER — HOSPITAL ENCOUNTER (OUTPATIENT)
Dept: RADIOLOGY | Facility: HOSPITAL | Age: 69
Discharge: HOME | End: 2024-08-15
Attending: INTERNAL MEDICINE
Payer: MEDICARE

## 2024-08-15 ENCOUNTER — OFFICE VISIT (OUTPATIENT)
Dept: NEUROLOGY | Facility: CLINIC | Age: 69
End: 2024-08-15
Payer: MEDICARE

## 2024-08-15 VITALS
DIASTOLIC BLOOD PRESSURE: 85 MMHG | WEIGHT: 196.88 LBS | SYSTOLIC BLOOD PRESSURE: 135 MMHG | HEART RATE: 53 BPM | BODY MASS INDEX: 30.83 KG/M2

## 2024-08-15 DIAGNOSIS — M79.89 PAIN AND SWELLING OF LOWER EXTREMITY, UNSPECIFIED LATERALITY: ICD-10-CM

## 2024-08-15 DIAGNOSIS — M54.9 DORSALGIA, UNSPECIFIED: ICD-10-CM

## 2024-08-15 DIAGNOSIS — R20.0 NUMBNESS: ICD-10-CM

## 2024-08-15 DIAGNOSIS — G62.9 PERIPHERAL POLYNEUROPATHY: Primary | ICD-10-CM

## 2024-08-15 DIAGNOSIS — G12.29 UPPER MOTOR NEURON LESION: ICD-10-CM

## 2024-08-15 DIAGNOSIS — M79.606 PAIN AND SWELLING OF LOWER EXTREMITY, UNSPECIFIED LATERALITY: ICD-10-CM

## 2024-08-15 PROCEDURE — 3008F BODY MASS INDEX DOCD: CPT | Mod: CPTII,S$GLB,, | Performed by: STUDENT IN AN ORGANIZED HEALTH CARE EDUCATION/TRAINING PROGRAM

## 2024-08-15 PROCEDURE — 3288F FALL RISK ASSESSMENT DOCD: CPT | Mod: CPTII,S$GLB,, | Performed by: STUDENT IN AN ORGANIZED HEALTH CARE EDUCATION/TRAINING PROGRAM

## 2024-08-15 PROCEDURE — 99203 OFFICE O/P NEW LOW 30 MIN: CPT | Mod: S$GLB,,, | Performed by: STUDENT IN AN ORGANIZED HEALTH CARE EDUCATION/TRAINING PROGRAM

## 2024-08-15 PROCEDURE — 1100F PTFALLS ASSESS-DOCD GE2>/YR: CPT | Mod: CPTII,S$GLB,, | Performed by: STUDENT IN AN ORGANIZED HEALTH CARE EDUCATION/TRAINING PROGRAM

## 2024-08-15 PROCEDURE — 3044F HG A1C LEVEL LT 7.0%: CPT | Mod: CPTII,S$GLB,, | Performed by: STUDENT IN AN ORGANIZED HEALTH CARE EDUCATION/TRAINING PROGRAM

## 2024-08-15 PROCEDURE — 99999 PR PBB SHADOW E&M-EST. PATIENT-LVL III: CPT | Mod: PBBFAC,,, | Performed by: STUDENT IN AN ORGANIZED HEALTH CARE EDUCATION/TRAINING PROGRAM

## 2024-08-15 PROCEDURE — 3075F SYST BP GE 130 - 139MM HG: CPT | Mod: CPTII,S$GLB,, | Performed by: STUDENT IN AN ORGANIZED HEALTH CARE EDUCATION/TRAINING PROGRAM

## 2024-08-15 PROCEDURE — 1159F MED LIST DOCD IN RCRD: CPT | Mod: CPTII,S$GLB,, | Performed by: STUDENT IN AN ORGANIZED HEALTH CARE EDUCATION/TRAINING PROGRAM

## 2024-08-15 PROCEDURE — 3079F DIAST BP 80-89 MM HG: CPT | Mod: CPTII,S$GLB,, | Performed by: STUDENT IN AN ORGANIZED HEALTH CARE EDUCATION/TRAINING PROGRAM

## 2024-08-15 PROCEDURE — 72100 X-RAY EXAM L-S SPINE 2/3 VWS: CPT | Mod: TC

## 2024-08-15 PROCEDURE — 1125F AMNT PAIN NOTED PAIN PRSNT: CPT | Mod: CPTII,S$GLB,, | Performed by: STUDENT IN AN ORGANIZED HEALTH CARE EDUCATION/TRAINING PROGRAM

## 2024-08-15 NOTE — PROGRESS NOTES
[facility]  Clinic Note    Date of Service: 8/15/2024  Patient seen at the request of: Lauren Fountain MD    Reason for Consultation  Fluctuating numbness in her distal lower extremities    Assessment:  Nano Sosa is a 69 y.o. female who presents with fluctuating, ascending numbness in the bilateral, distal, lower extremities.    Patient presents with a year-long history of distal lower extremity numbness that occurs for minutes once a day.  Initially only affecting the bilateral feet, numbness has extended to include the anterior shins, below-the-knee.  Patient denies any focal pain or weakness.  Patient had a mechanical fall 1 week ago.  Fall was associated with a lumbar compression fracture seen on x-ray at L4.  On exam today, patient has hyperreflexia in right patellar reflex associated with the L3 and L4 nerve roots.  We will assess further with an MRI lumbar spine.  Due to transient numbness, we will also obtain an MRI brain.  Differential also includes lower motor neuron causes for numbness.  We will assess with neuropathy labs and EMG nerve conduction study.      Plan:    - MRI brain wo contrast  - MRI lumbar wo contrast  - EMG/NCS ordered by outside physician  - B1, MMA, copper levels      - RTC in 6 weeks      Signed:    Sudarshan Richards MD  Neurology/Epilepsy  08/15/2024 7:57 AM      HPI:  Nano Sosa is a 69 y.o. female with   Past Medical History:   Diagnosis Date    General anesthetics causing adverse effect in therapeutic use     wakes up hyperventilating/chills    Goiter     Hashimoto's disease     Migraine     Osteopenia     Rectal mass     Ulcerative colitis     Urinary tract infection     Vitamin D deficiency      Patient has a history of distal neuropathy after a bout of Covid in the summer of 2023.  At that point it was fluctuating.  After recovering from foot surgery in March 2024 it worsened in severity.  Numbness in her feet is noticed once a day and lasts for a  couple of minutes.  Overtime, she has noticed that it is extending superiorly.  Occasionally felt in the fingertips.  Described as a lack of feeling.  Does not feel unsteady on her feet when it occurs.  No tingling/burning.    Patient has a history of ulcerative colitis.  Takes mesalamine suppository.      Patient had a fall from a ladder last week.  Complicated by L4 compression fracture.      This is the extent of the patient's complaints at this time.      Review of Systems:  ROS negative unless noted in HPI    Past Surgical History:  Past Surgical History:   Procedure Laterality Date    BUNIONECTOMY      right    BUNIONECTOMY Left 3/15/2024    Procedure: BUNIONECTOMY;  Surgeon: Ethan Vaughn DPM;  Location: [facility] OR;  Service: Podiatry;  Laterality: Left;    colon sx      COLONOSCOPY N/A 12/19/2016    Procedure: COLONOSCOPY;  Surgeon: Alli Peña MD;  Location: [facility] (4TH FLR);  Service: Endoscopy;  Laterality: N/A;    COLONOSCOPY N/A 1/7/2019    Procedure: COLONOSCOPY;  Surgeon: Vel Self MD;  Location: [facility] (4TH FLR);  Service: Endoscopy;  Laterality: N/A;  Pt requests Dr. Peña, Pt ok with Dr. Self    COLONOSCOPY N/A 2/13/2023    Procedure: COLONOSCOPY;  Surgeon: Srinivas Linares MD;  Location: [facility] (4TH FLR);  Service: Endoscopy;  Laterality: N/A;  instructions via [place] -   pt request Sutab  chgd from \[facility]"" to [facility]--1/17  Precall complete- [place]    f.e.s.s.  12/5/2014    FACIAL COSMETIC SURGERY      FLEXIBLE SIGMOIDOSCOPY N/A 3/19/2024    Procedure: SIGMOIDOSCOPY, FLEXIBLE;  Surgeon: Srinivas Linares MD;  Location: [facility] (4TH FLR);  Service: Endoscopy;  Laterality: N/A;  2/16 r/s due to a change in doctor's randall.,sent updated instr via portal.AC  Ref By: , instr sent via portal. AC  2/9-lvm for precall-MS  3/13-lvm for precall-MS  3/13-precall complete-Kpvt    FOOT HARDWARE REMOVAL Right 3/15/2024    Procedure: REMOVAL, HARDWARE, FOOT;  Surgeon:  "Ethan Vaughn DPM;  Location: [facility] OR;  Service: Podiatry;  Laterality: Right;    TOTAL ABDOMINAL HYSTERECTOMY      [facility]       Family History:  Family History   Problem Relation Name Age of Onset    Rheum arthritis Mother      Migraines Mother      Breast cancer Maternal Aunt      Heart disease Maternal Uncle      Hypertension Maternal Uncle      Colon cancer Maternal Aunt      Anesthesia problems Neg Hx      Diabetes Neg Hx      Ovarian cancer Neg Hx         Social History:  Social History     Tobacco Use    Smoking status: Former     Current packs/day: 0.00     Average packs/day: 1 pack/day for 30.0 years (30.0 ttl pk-yrs)     Types: Cigarettes     Start date: 1969     Quit date: 1999     Years since quittin.0     Passive exposure: Never    Smokeless tobacco: Never   Substance Use Topics    Alcohol use: Yes     Alcohol/week: 6.0 standard drinks of alcohol     Types: 4 Glasses of wine, 2 Standard drinks or equivalent per week     Comment: weekends    Drug use: No       Allergies:  Penicillins, Poison ivy extract, Poison sumac extract, Sulfa (sulfonamide antibiotics), Codeine, Erythromycin, Tessalon [benzonatate], and Unable to assess    Outpatient Medications:  Prior to Admission medications    Medication Sig Start Date End Date Taking? Authorizing Provider   acetaminophen (TYLENOL) 325 MG tablet Take 2 tablets (650 mg total) by mouth every 6 (six) hours as needed for Pain. 23   Sudarshan Groves MD   b complex vitamins tablet Take 1 tablet by mouth once daily.    Provider, Historical   BD ULTRA-FINE POOJA PEN NEEDLE 32 gauge x 5/32" Ndle For Forteo - inject daily 24   Garrick Templeton MD   calcium carbonate (OS-CONRAD) 600 mg calcium (1,500 mg) Tab Take 600 mg by mouth once daily.    Provider, Historical   cholecalciferol, vitamin D3, (VITAMIN D3) 50 mcg (2,000 unit) Cap capsule Take 4,000 Units by mouth once daily.    Provider, Historical   diclofenac sodium (VOLTAREN) 1 % Gel " Apply 2 g topically 4 (four) times daily. 5/1/24   Ethan Vaughn, RHONDA   ferrous sulfate (FEOSOL) 325 mg (65 mg iron) Tab tablet Take 1 tablet (325 mg total) by mouth once daily. 12/31/23 12/25/24  Lauren Fountain MD   fluticasone propionate (FLONASE) 50 mcg/actuation nasal spray by Each Nostril route. 5/24/24   Provider, Historical   mesalamine (CANASA) 1000 MG Supp Place 1 suppository (1,000 mg total) rectally nightly. 6/3/24   Srinivas Linares MD   multivitamin capsule Take 1 capsule by mouth once daily.    Provider, Historical   teriparatide (FORTEO) 20 mcg/dose (600mcg/2.4mL) PnIj Inject 0.08 mLs (20 mcg total) into the skin once daily. 8/13/24 8/13/25  Garrick Templeton MD       Physical exam:    Vitals: /85 (BP Location: Left arm, Patient Position: Sitting, BP Method: Small (Automatic))   Pulse (!) 53   Wt 89.3 kg (196 lb 13.9 oz)   BMI 30.83 kg/m²     General:   Sitting in chair, in no distress, well-nourished, well-developed, appears stated age.  Head/Neck:   Normocephalic,atraumatic  Pulm:  Non-labored breathing     Mental Status: Alert and oriented to person, time, place, situation. Speech spontaneous and fluent without paraphasias; no dysarthria  CN:  II: visual fields full  III, IV, VI: EOM intact without nystagmus or diplopia.   V: Sensation to light touch full and symmetric in V1-3. Masseter contraction full bilaterally.   VII: Facial movement full and symmetric.   VIII: Hearing grossly normal to conversation.  IX, X: Palate midline with symmetric elevation.    XI: SCM and trapezius: 5/5 bilaterally.   XII: Tongue midline without fasciculations.  Motor: Normal bulk and tone throughout all four extremities.   RUE: D: 5/5; B: 5/5; T:  5/5; WF:5/5; WE:  5/5; IO: 5/5   LUE: D: 5/5; B: 5/5; T:  5/5; WF: 5/5; WE:  5/5; IO: 5/5   RLE: HF: 5/5, KE: 5/5, KF: 5/5, DF: 5/5, PF: 5/5  LLE: HF: 5/5, KE: 5/5, KF: 5/5, DF: 5/5, PF: 5/5  No tremors   Sensory: Intact and symmetric to light touch and  vibration throughout.    Proprioception intact at the toes  Reflexes: RUE: Triceps 2+, biceps 2+, brachioradialis 2+  LUE: Triceps 2+, biceps 2+ brachioradialis 2+  RLE: Knee 3+, ankle 2+  LLE: Knee 2+, ankle 2+  Coordination:  Intact and symmetric to finger-to-nose and heel-to-shin.  Gait:  Intact to casual gait. Romberg negative.    Imaging:  All pertinent imaging was personally reviewed.      I spent a total of 40 minutes on the day of the visit. This includes face to face time and non-face to face time preparing to see the patient (eg, review of tests), obtaining and/or reviewing separately obtained history, documenting clinical information in the electronic or other health record, independently interpreting results and communicating results to the patient/family/caregiver, or care coordinator.

## 2024-08-16 DIAGNOSIS — S82.025D CLOSED NONDISPLACED LONGITUDINAL FRACTURE OF LEFT PATELLA WITH ROUTINE HEALING, SUBSEQUENT ENCOUNTER: Primary | ICD-10-CM

## 2024-08-16 DIAGNOSIS — M54.50 LOW BACK PAIN, UNSPECIFIED BACK PAIN LATERALITY, UNSPECIFIED CHRONICITY, UNSPECIFIED WHETHER SCIATICA PRESENT: ICD-10-CM

## 2024-08-16 NOTE — TELEPHONE ENCOUNTER
Spoke to patient over the phone.  She is managing her pain well with over-the-counter medications.  She has MRI scheduled for next week for her new compression fracture at L4.  I placed a referral to ortho spine for her to follow up with them after the MRI.  Also of note she recently met with endocrinology and has been started on treatment for osteoporosis.

## 2024-08-19 ENCOUNTER — HOSPITAL ENCOUNTER (OUTPATIENT)
Dept: RADIOLOGY | Facility: HOSPITAL | Age: 69
Discharge: HOME | End: 2024-08-19
Attending: ORTHOPAEDIC SURGERY
Payer: MEDICARE

## 2024-08-19 ENCOUNTER — OFFICE VISIT (OUTPATIENT)
Dept: ORTHOPEDICS | Facility: CLINIC | Age: 69
End: 2024-08-19
Payer: MEDICARE

## 2024-08-19 VITALS — HEIGHT: 67 IN | BODY MASS INDEX: 30.9 KG/M2 | WEIGHT: 196.88 LBS

## 2024-08-19 DIAGNOSIS — M54.50 LOW BACK PAIN, UNSPECIFIED BACK PAIN LATERALITY, UNSPECIFIED CHRONICITY, UNSPECIFIED WHETHER SCIATICA PRESENT: ICD-10-CM

## 2024-08-19 DIAGNOSIS — S32.040A COMPRESSION FRACTURE OF L4 LUMBAR VERTEBRA, CLOSED, INITIAL ENCOUNTER: ICD-10-CM

## 2024-08-19 PROCEDURE — 3008F BODY MASS INDEX DOCD: CPT | Mod: CPTII,S$GLB,, | Performed by: ORTHOPAEDIC SURGERY

## 2024-08-19 PROCEDURE — 99204 OFFICE O/P NEW MOD 45 MIN: CPT | Mod: S$GLB,,, | Performed by: ORTHOPAEDIC SURGERY

## 2024-08-19 PROCEDURE — 3044F HG A1C LEVEL LT 7.0%: CPT | Mod: CPTII,S$GLB,, | Performed by: ORTHOPAEDIC SURGERY

## 2024-08-19 PROCEDURE — 72120 X-RAY BEND ONLY L-S SPINE: CPT | Mod: TC

## 2024-08-19 PROCEDURE — 1101F PT FALLS ASSESS-DOCD LE1/YR: CPT | Mod: CPTII,S$GLB,, | Performed by: ORTHOPAEDIC SURGERY

## 2024-08-19 PROCEDURE — 3288F FALL RISK ASSESSMENT DOCD: CPT | Mod: CPTII,S$GLB,, | Performed by: ORTHOPAEDIC SURGERY

## 2024-08-19 PROCEDURE — 99999 PR PBB SHADOW E&M-EST. PATIENT-LVL III: CPT | Mod: PBBFAC,,, | Performed by: ORTHOPAEDIC SURGERY

## 2024-08-19 PROCEDURE — 1159F MED LIST DOCD IN RCRD: CPT | Mod: CPTII,S$GLB,, | Performed by: ORTHOPAEDIC SURGERY

## 2024-08-19 PROCEDURE — 1125F AMNT PAIN NOTED PAIN PRSNT: CPT | Mod: CPTII,S$GLB,, | Performed by: ORTHOPAEDIC SURGERY

## 2024-08-19 PROCEDURE — 72120 X-RAY BEND ONLY L-S SPINE: CPT | Mod: 26,,, | Performed by: RADIOLOGY

## 2024-08-19 NOTE — PROGRESS NOTES
DATE: 8/19/2024  PATIENT: Nano Sosa    Supervising Physician: Vel Mccoy M.D.    CHIEF COMPLAINT: low back pain    HISTORY:  Nano Sosa is a 69 y.o. female here for initial evaluation of low back pain (Back - 4, Leg - 0).  The pain in the lower back is what bothers her most.  The pain has been present since a fall on 7/31/24. Pt says she missed a step on a ladder. The patient describes the pain as aching.  The pain is worse with activity and improved by rest. There is negative associated numbness and tingling. There is negative subjective weakness. Prior treatments have included no PT, ESIs, surgery.    The patient denies myelopathic symptoms such as handwriting changes or difficulty with buttons/coins/keys. Denies perineal paresthesias, bowel/bladder dysfunction.    PAST MEDICAL/SURGICAL HISTORY:  Past Medical History:   Diagnosis Date    General anesthetics causing adverse effect in therapeutic use     wakes up hyperventilating/chills    Goiter     Hashimoto's disease     Migraine     Osteopenia     Rectal mass     Ulcerative colitis     Urinary tract infection     Vitamin D deficiency      Past Surgical History:   Procedure Laterality Date    BUNIONECTOMY      right    BUNIONECTOMY Left 3/15/2024    Procedure: BUNIONECTOMY;  Surgeon: Ethan Vaughn DPM;  Location: [facility];  Service: Podiatry;  Laterality: Left;    colon sx      COLONOSCOPY N/A 12/19/2016    Procedure: COLONOSCOPY;  Surgeon: Alli Peña MD;  Location: [address]);  Service: Endoscopy;  Laterality: N/A;    COLONOSCOPY N/A 1/7/2019    Procedure: COLONOSCOPY;  Surgeon: Vel Self MD;  Location: [address]);  Service: Endoscopy;  Laterality: N/A;  Pt requests Dr. Peña, Pt ok with Dr. Self    COLONOSCOPY N/A 2/13/2023    Procedure: COLONOSCOPY;  Surgeon: Srinivas Linares MD;  Location: [address]);  Service: Endoscopy;  Laterality: N/A;  instructions via portal -   pt request  "Sutab  [facility]gd from [facility] to [facility] [place]17  Precall complete- [place]    f.e.s.s.  12/5/2014    FACIAL COSMETIC SURGERY      FLEXIBLE SIGMOIDOSCOPY N/A 3/19/2024    Procedure: SIGMOIDOSCOPY, FLEXIBLE;  Surgeon: Srinivas Linares MD;  [facility]  Service: Endoscopy;  Laterality: N/A;  2/16 r/s due to a change in doctor's randall.,sent updated instr via portal.AC  Ref By: , instr sent via portal. AC  2/9-lvm for precall-MS  3/13-lvm for precall-MS  3/13-precall complete-Kpvt    FOOT HARDWARE REMOVAL Right 3/15/2024    Procedure: REMOVAL, HARDWARE, FOOT;  Surgeon: Ethan Vaughn DPM;  Location: [facility] OR;  Service: Podiatry;  Laterality: Right;    TOTAL ABDOMINAL HYSTERECTOMY  1994    [facility]       Medications:   Current Outpatient Medications on File Prior to Visit   Medication Sig Dispense Refill    acetaminophen (TYLENOL) 325 MG tablet Take 2 tablets (650 mg total) by mouth every 6 (six) hours as needed for Pain. 30 tablet 0    b complex vitamins tablet Take 1 tablet by mouth once daily.      BD ULTRA-FINE POOJA PEN NEEDLE 32 gauge x 5/32" Ndle For Forteo - inject daily 100 each 3    calcium carbonate (OS-CONRAD) 600 mg calcium (1,500 mg) Tab Take 600 mg by mouth once daily.      cholecalciferol, vitamin D3, (VITAMIN D3) 50 mcg (2,000 unit) Cap capsule Take 4,000 Units by mouth once daily.      diclofenac sodium (VOLTAREN) 1 % Gel Apply 2 g topically 4 (four) times daily. 100 g 2    ferrous sulfate (FEOSOL) 325 mg (65 mg iron) Tab tablet Take 1 tablet (325 mg total) by mouth once daily. 90 tablet 3    fluticasone propionate (FLONASE) 50 mcg/actuation nasal spray by Each Nostril route.      mesalamine (CANASA) 1000 MG Supp Place 1 suppository (1,000 mg total) rectally nightly. 90 suppository 2    multivitamin capsule Take 1 capsule by mouth once daily.      teriparatide (FORTEO) 20 mcg/dose (600mcg/2.4mL) PnIj Inject 0.08 mLs (20 mcg total) into the skin once daily. 2.4 mL 11     No current " facility-administered medications on file prior to visit.       Social History:   Social History     Socioeconomic History    Marital status: Single   Tobacco Use    Smoking status: Former     Current packs/day: 0.00     Average packs/day: 1 pack/day for 30.0 years (30.0 ttl pk-yrs)     Types: Cigarettes     Start date: 1969     Quit date: 1999     Years since quittin.0     Passive exposure: Never    Smokeless tobacco: Never   Substance and Sexual Activity    Alcohol use: Yes     Alcohol/week: 6.0 standard drinks of alcohol     Types: 4 Glasses of wine, 2 Standard drinks or equivalent per week     Comment: weekends    Drug use: No    Sexual activity: Not Currently     Partners: Male     Birth control/protection: See Surgical Hx   Social History Narrative    Walks, does yoga and rides bike for exercise.      Social Determinants of Health     Financial Resource Strain: Low Risk  (2024)    Overall Financial Resource Strain (CARDIA)     Difficulty of Paying Living Expenses: Not very hard   Food Insecurity: No Food Insecurity (2024)    Hunger Vital Sign     Worried About Running Out of Food in the Last Year: Never true     Ran Out of Food in the Last Year: Never true   Transportation Needs: No Transportation Needs (2024)    PRAPARE - Transportation     Lack of Transportation (Medical): No     Lack of Transportation (Non-Medical): No   Physical Activity: Sufficiently Active (2024)    Exercise Vital Sign     Days of Exercise per Week: 5 days     Minutes of Exercise per Session: 40 min   Stress: No Stress Concern Present (2024)    [employer] - Occupational Stress Questionnaire     Feeling of Stress : Only a little   Housing Stability: Low Risk  (2024)    Housing Stability Vital Sign     Unable to Pay for Housing in the Last Year: No     Number of Places Lived in the Last Year: 1     Unstable Housing in the Last Year: No       REVIEW OF  SYSTEMS:  Constitution: Negative. Negative for chills, fever and night sweats.   Cardiovascular: Negative for chest pain and syncope.   Respiratory: Negative for cough and shortness of breath.   Gastrointestinal: See HPI. Negative for nausea/vomiting. Negative for abdominal pain.  Genitourinary: See HPI. Negative for discoloration or dysuria.  Skin: Negative for dry skin, itching and rash.   Hematologic/Lymphatic: Negative for bleeding problem. Does not bruise/bleed easily.   Musculoskeletal: Negative for falls and muscle weakness.   Neurological: See HPI. No seizures.   Endocrine: Negative for polydipsia, polyphagia and polyuria.   Allergic/Immunologic: Negative for hives and persistent infections.     EXAM:  There were no vitals taken for this visit.    General: The patient is a very pleasant 69 y.o. female in no apparent distress, the patient is oriented to person, place and time.  Psych: Normal mood and affect  HEENT: Vision grossly intact, hearing intact to the spoken word.  Lungs: Respirations unlabored.  Gait: Normal station and gait, no difficulty with toe or heel walk.   Skin: Dorsal lumbar skin negative for rashes, lesions, hairy patches and surgical scars. There is negative lumbar tenderness to palpation.  Range of motion: Lumbar range of motion is acceptable.  Spinal Balance: Global saggital and coronal spinal balance acceptable, not significant for scoliosis and kyphosis.  Musculoskeletal: No pain with the range of motion of the bilateral hips. No trochanteric tenderness to palpation.  Vascular: Bilateral lower extremities warm and well perfused, dorsalis pedis pulses 2+ bilaterally.  Neurological: Normal strength and tone in all major motor groups in the bilateral lower extremities. Normal sensation to light touch in the L2-S1 dermatomes bilaterally.  Deep tendon reflexes symmetric 2+ in the bilateral lower extremities.  Negative Babinski bilaterally. Straight leg raise negative  bilaterally.    IMAGING:      Today I personally reviewed AP, Lat and Flex/Ex  upright L-spine films that demonstrate L4 VCF without retropulsion, grade I anterolisthesis of L4 on L5.      There is no height or weight on file to calculate BMI.    Hemoglobin A1C   Date Value Ref Range Status   07/19/2024 5.2 4.0 - 5.6 % Final     Comment:     ADA Screening Guidelines:  5.7-6.4%  Consistent with prediabetes  >or=6.5%  Consistent with diabetes    High levels of fetal hemoglobin interfere with the HbA1C  assay. Heterozygous hemoglobin variants (HbS, HgC, etc)do  not significantly interfere with this assay.   However, presence of multiple variants may affect accuracy.     08/02/2023 5.0 4.0 - 5.6 % Final     Comment:     ADA Screening Guidelines:  5.7-6.4%  Consistent with prediabetes  >or=6.5%  Consistent with diabetes    High levels of fetal hemoglobin interfere with the HbA1C  assay. Heterozygous hemoglobin variants (HbS, HgC, etc)do  not significantly interfere with this assay.   However, presence of multiple variants may affect accuracy.     12/18/2015 5.3 4.5 - 6.2 % Final           ASSESSMENT/PLAN:    There are no diagnoses linked to this encounter.    Today we discussed at length all of the different treatment options including anti-inflammatories, acetaminophen, rest, ice, heat, physical therapy including strengthening and stretching exercises, home exercises, ROM, aerobic conditioning, aqua therapy, other modalities including ultrasound, massage, and dry needling, epidural steroid injections and finally surgical intervention.      Pt presents with acute L4 VCF without retropulsion. Will treat conservatively. Will obtain fu xrays in a month. Pt will fu if pain persists.

## 2024-08-22 ENCOUNTER — HOSPITAL ENCOUNTER (OUTPATIENT)
Dept: ENDOCRINOLOGY | Facility: CLINIC | Age: 69
Discharge: HOME | End: 2024-08-22
Attending: INTERNAL MEDICINE
Payer: MEDICARE

## 2024-08-22 DIAGNOSIS — E04.1 THYROID NODULE: ICD-10-CM

## 2024-08-22 PROCEDURE — 76536 US EXAM OF HEAD AND NECK: CPT | Mod: S$GLB,,, | Performed by: INTERNAL MEDICINE

## 2024-08-24 ENCOUNTER — HOSPITAL ENCOUNTER (OUTPATIENT)
Dept: RADIOLOGY | Facility: OTHER | Age: 69
Discharge: HOME | End: 2024-08-24
Attending: STUDENT IN AN ORGANIZED HEALTH CARE EDUCATION/TRAINING PROGRAM
Payer: MEDICARE

## 2024-08-24 DIAGNOSIS — M54.9 DORSALGIA, UNSPECIFIED: ICD-10-CM

## 2024-08-24 DIAGNOSIS — R20.0 NUMBNESS: ICD-10-CM

## 2024-08-24 PROCEDURE — 72148 MRI LUMBAR SPINE W/O DYE: CPT | Mod: TC

## 2024-08-24 PROCEDURE — 70551 MRI BRAIN STEM W/O DYE: CPT | Mod: 26,,, | Performed by: RADIOLOGY

## 2024-08-24 PROCEDURE — 70551 MRI BRAIN STEM W/O DYE: CPT | Mod: TC

## 2024-08-24 PROCEDURE — 72148 MRI LUMBAR SPINE W/O DYE: CPT | Mod: 26,,, | Performed by: RADIOLOGY

## 2024-08-27 ENCOUNTER — LAB VISIT (OUTPATIENT)
Dept: LAB | Facility: HOSPITAL | Age: 69
End: 2024-08-27
Attending: INTERNAL MEDICINE
Payer: MEDICARE

## 2024-08-27 DIAGNOSIS — K51.90 ULCERATIVE COLITIS WITHOUT COMPLICATIONS, UNSPECIFIED LOCATION: ICD-10-CM

## 2024-08-27 PROCEDURE — 83993 ASSAY FOR CALPROTECTIN FECAL: CPT | Performed by: INTERNAL MEDICINE

## 2024-08-30 ENCOUNTER — PATIENT MESSAGE (OUTPATIENT)
Dept: GASTROENTEROLOGY | Facility: CLINIC | Age: 69
End: 2024-08-30
Payer: MEDICARE

## 2024-08-30 LAB — CALPROTECTIN STL-MCNT: <5 MCG/G

## 2024-09-03 ENCOUNTER — PATIENT MESSAGE (OUTPATIENT)
Dept: ORTHOPEDICS | Facility: CLINIC | Age: 69
End: 2024-09-03
Payer: MEDICARE

## 2024-09-03 ENCOUNTER — PATIENT MESSAGE (OUTPATIENT)
Dept: PODIATRY | Facility: CLINIC | Age: 69
End: 2024-09-03
Payer: MEDICARE

## 2024-09-04 ENCOUNTER — OFFICE VISIT (OUTPATIENT)
Dept: VASCULAR SURGERY | Facility: CLINIC | Age: 69
End: 2024-09-04
Payer: MEDICARE

## 2024-09-04 VITALS
WEIGHT: 196.88 LBS | HEART RATE: 56 BPM | DIASTOLIC BLOOD PRESSURE: 60 MMHG | HEIGHT: 67 IN | BODY MASS INDEX: 30.9 KG/M2 | SYSTOLIC BLOOD PRESSURE: 123 MMHG

## 2024-09-04 DIAGNOSIS — I78.1 ASYMPTOMATIC SPIDER VEINS OF BOTH LOWER EXTREMITIES: ICD-10-CM

## 2024-09-04 DIAGNOSIS — I78.1 ASYMPTOMATIC SPIDER VEINS OF BOTH LOWER EXTREMITIES: Primary | ICD-10-CM

## 2024-09-04 DIAGNOSIS — I83.813 VARICOSE VEINS OF BILATERAL LOWER EXTREMITIES WITH PAIN: Primary | ICD-10-CM

## 2024-09-04 PROCEDURE — 3008F BODY MASS INDEX DOCD: CPT | Mod: CPTII,S$GLB,, | Performed by: SURGERY

## 2024-09-04 PROCEDURE — 99213 OFFICE O/P EST LOW 20 MIN: CPT | Mod: S$GLB,,, | Performed by: SURGERY

## 2024-09-04 PROCEDURE — 1159F MED LIST DOCD IN RCRD: CPT | Mod: CPTII,S$GLB,, | Performed by: SURGERY

## 2024-09-04 PROCEDURE — 3044F HG A1C LEVEL LT 7.0%: CPT | Mod: CPTII,S$GLB,, | Performed by: SURGERY

## 2024-09-04 PROCEDURE — 1126F AMNT PAIN NOTED NONE PRSNT: CPT | Mod: CPTII,S$GLB,, | Performed by: SURGERY

## 2024-09-04 PROCEDURE — 3078F DIAST BP <80 MM HG: CPT | Mod: CPTII,S$GLB,, | Performed by: SURGERY

## 2024-09-04 PROCEDURE — 3074F SYST BP LT 130 MM HG: CPT | Mod: CPTII,S$GLB,, | Performed by: SURGERY

## 2024-09-04 NOTE — PROGRESS NOTES
Vel Campos MD, RPVI                                 [facility]                           [facility]                             09/04/2024    HPI:  Nano Sosa is a 69 y.o. female with   Patient Active Problem List   Diagnosis    Thyroid nodule    History of Hashimoto thyroiditis    Environmental allergies    History of colon polyps    Mild vitamin D deficiency    Rectal mass    Pulmonary nodules    Colon polyp    Postmenopausal osteoporosis    Joint stiffness of left foot    Difficulty walking    being managed by PCP and specialists who is here today for evaluation of BLE edema and varicose veins.  Patient states location is BLE occurring for months.  Associated signs and symptoms include varicose veins.  Quality is heavy and severity is 5/10.  Symptoms began months ago.  Alleviating factors include elevation.  Worsening factors include dependency.  Patient has been wearing compression stockings for greater than 3 months.  +FH of venous disease.  Symptoms do limit patient's functional status and daily activities.  no DVT history.  no venous interventions.  + low sodium diet.  no excessive water intake.    Migraine with aura: no  PFO/ASD/right to left shunt: no  Pregnant: no  Breastfeeding: no    no MI  no Stroke  Tobacco use: denies    9/2024:  s/p BLE stab phlebectomy for varicose veins 2/2024.  Happy with results.    Past Medical History:   Diagnosis Date    General anesthetics causing adverse effect in therapeutic use     wakes up hyperventilating/chills    Goiter     Hashimoto's disease     Migraine     Osteopenia     Rectal mass     Ulcerative colitis     Urinary tract infection     Vitamin D deficiency      Past Surgical History:   Procedure Laterality Date    BUNIONECTOMY      right    BUNIONECTOMY Left 3/15/2024    Procedure: BUNIONECTOMY;  Surgeon: Ethan Vaughn DPM;  Location: [facility];  Service: Podiatry;  Laterality: Left;    colon sx      COLONOSCOPY  N/A 2016    Procedure: COLONOSCOPY;  Surgeon: Alli Peña MD;  Location: [facility] ENDO (4TH FLR);  Service: Endoscopy;  Laterality: N/A;    COLONOSCOPY N/A 2019    Procedure: COLONOSCOPY;  Surgeon: Vel Self MD;  Location: [facility] ENDO (4TH FLR);  Service: Endoscopy;  Laterality: N/A;  Pt requests Dr. Peña, Pt ok with Dr. Self    COLONOSCOPY N/A 2023    Procedure: COLONOSCOPY;  Surgeon: Srinivas Linares MD;  Location: [facility] ENDO (4TH FLR);  Service: Endoscopy;  Laterality: N/A;  instructions via portal -   pt request Sutab  [facility]gd from [facility] to [facility]--  Precall complete- [place]    f.e.s.s.  2014    FACIAL COSMETIC SURGERY      FLEXIBLE SIGMOIDOSCOPY N/A 3/19/2024    Procedure: SIGMOIDOSCOPY, FLEXIBLE;  Surgeon: Srinivas Linares MD;  Location: [facility] ENDO (4TH FLR);  Service: Endoscopy;  Laterality: N/A;   r/s due to a change in doctor's randall.,sent updated instr via portal.AC  Ref By: , instr sent via portal. AC  -lvm for precall-MS  3/13-lvm for precall-MS  3/13-precall complete-Kpvt    FOOT HARDWARE REMOVAL Right 3/15/2024    Procedure: REMOVAL, HARDWARE, FOOT;  Surgeon: Ethan Vaughn DPM;  Location: [facility];  Service: Podiatry;  Laterality: Right;    TOTAL ABDOMINAL HYSTERECTOMY      [facility]     Family History   Problem Relation Name Age of Onset    Rheum arthritis Mother      Migraines Mother      Breast cancer Maternal Aunt      Heart disease Maternal Uncle      Hypertension Maternal Uncle      Colon cancer Maternal Aunt      Anesthesia problems Neg Hx      Diabetes Neg Hx      Ovarian cancer Neg Hx       Social History     Socioeconomic History    Marital status: Single   Tobacco Use    Smoking status: Former     Current packs/day: 0.00     Average packs/day: 1 pack/day for 30.0 years (30.0 ttl pk-yrs)     Types: Cigarettes     Start date: 1969     Quit date: 1999     Years since quittin.0     Passive exposure: Never    Smokeless  "tobacco: Never   Substance and Sexual Activity    Alcohol use: Yes     Alcohol/week: 6.0 standard drinks of alcohol     Types: 4 Glasses of wine, 2 Standard drinks or equivalent per week     Comment: weekends    Drug use: No    Sexual activity: Not Currently     Partners: Male     Birth control/protection: See Surgical Hx   Social History Narrative    Walks, does yoga and rides bike for exercise.      Social Determinants of Health     Financial Resource Strain: Low Risk  (1/17/2024)    Overall Financial Resource Strain (CARDIA)     Difficulty of Paying Living Expenses: Not very hard   Food Insecurity: No Food Insecurity (1/17/2024)    Hunger Vital Sign     Worried About Running Out of Food in the Last Year: Never true     Ran Out of Food in the Last Year: Never true   Transportation Needs: No Transportation Needs (1/17/2024)    PRAPARE - Transportation     Lack of Transportation (Medical): No     Lack of Transportation (Non-Medical): No   Physical Activity: Sufficiently Active (1/17/2024)    Exercise Vital Sign     Days of Exercise per Week: 5 days     Minutes of Exercise per Session: 40 min   Stress: No Stress Concern Present (1/17/2024)    [employer] - Occupational Stress Questionnaire     Feeling of Stress : Only a little   Housing Stability: Low Risk  (1/17/2024)    Housing Stability Vital Sign     Unable to Pay for Housing in the Last Year: No     Number of Places Lived in the Last Year: 1     Unstable Housing in the Last Year: No       Current Outpatient Medications:     acetaminophen (TYLENOL) 325 MG tablet, Take 2 tablets (650 mg total) by mouth every 6 (six) hours as needed for Pain., Disp: 30 tablet, Rfl: 0    b complex vitamins tablet, Take 1 tablet by mouth once daily., Disp: , Rfl:     BD ULTRA-FINE POOJA PEN NEEDLE 32 gauge x 5/32" Ndle, For Forteo - inject daily, Disp: 100 each, Rfl: 3    calcium carbonate (OS-CONRAD) 600 mg calcium (1,500 mg) Tab, Take 600 mg by mouth once " daily., Disp: , Rfl:     cholecalciferol, vitamin D3, (VITAMIN D3) 50 mcg (2,000 unit) Cap capsule, Take 4,000 Units by mouth once daily., Disp: , Rfl:     diclofenac sodium (VOLTAREN) 1 % Gel, Apply 2 g topically 4 (four) times daily., Disp: 100 g, Rfl: 2    ferrous sulfate (FEOSOL) 325 mg (65 mg iron) Tab tablet, Take 1 tablet (325 mg total) by mouth once daily., Disp: 90 tablet, Rfl: 3    fluticasone propionate (FLONASE) 50 mcg/actuation nasal spray, by Each Nostril route., Disp: , Rfl:     mesalamine (CANASA) 1000 MG Supp, Place 1 suppository (1,000 mg total) rectally nightly., Disp: 90 suppository, Rfl: 2    multivitamin capsule, Take 1 capsule by mouth once daily., Disp: , Rfl:     teriparatide (FORTEO) 20 mcg/dose (600mcg/2.4mL) PnIj, Inject 0.08 mLs (20 mcg total) into the skin once daily., Disp: 2.4 mL, Rfl: 11    REVIEW OF SYSTEMS:  General: No fevers or chills; ENT: No sore throat; Allergy and Immunology: no persistent infections; Hematological and Lymphatic: No history of bleeding or easy bruising; Endocrine: negative; Respiratory: no cough, shortness of breath, or wheezing; Cardiovascular: no chest pain or dyspnea on exertion; Gastrointestinal: no abdominal pain/back, change in bowel habits, or bloody stools; Genito-Urinary: no dysuria, trouble voiding, or hematuria; Musculoskeletal: edema; Neurological: no TIA or stroke symptoms; Psychiatric: no nervousness, anxiety or depression.    PHYSICAL EXAM:      Pulse: (!) 56         General appearance:  Alert, well-appearing, and in no distress.  Oriented to person, place, and time                    Neurological: Normal speech, no focal findings noted; CN II - XII grossly intact. RLE with sensation to light touch, LLE with sensation to light touch.            Musculoskeletal: Digits/nail without cyanosis/clubbing.  Strength 5/5 BLE.                    Neck: Supple, no significant adenopathy                  Chest:  No wheezes, symmetric air entry. No use of  "accessory muscles               Cardiac: Normal rate and regular rhythm            Abdomen: Soft, nontender, nondistended      Extremities:   2+ R DP pulse, 2+ L DP pulse      1+ RLE edema, 1+ LLE edema    Skin:  RLE no ulcer; LLE no ulcer      RLE + spider veins, LLE + spider veins      RLE + varicose veins, LLE + varicose veins    CEAP 3/3    VCSS 4    LAB RESULTS:  No results found for: "CBC"  Lab Results   Component Value Date    LABPROT 10.8 02/29/2024    INR 1.0 02/29/2024     Lab Results   Component Value Date     07/19/2024    K 4.3 07/19/2024     07/19/2024    CO2 25 07/19/2024    GLU 84 07/19/2024    BUN 21 07/19/2024    CREATININE 0.8 07/19/2024    CALCIUM 9.1 07/19/2024    ANIONGAP 7 (L) 07/19/2024    EGFRNONAA >60.0 05/18/2022     Lab Results   Component Value Date    WBC 6.68 07/19/2024    RBC 3.99 (L) 07/19/2024    HGB 12.3 07/19/2024    HCT 38.7 07/19/2024    MCV 97 07/19/2024    MCH 30.8 07/19/2024    MCHC 31.8 (L) 07/19/2024    RDW 13.0 07/19/2024     07/19/2024    MPV 11.0 07/19/2024    GRAN 4.5 07/19/2024    GRAN 67.1 07/19/2024    LYMPH 1.7 07/19/2024    LYMPH 24.7 07/19/2024    MONO 0.4 07/19/2024    MONO 6.0 07/19/2024    EOS 0.1 07/19/2024    BASO 0.04 07/19/2024    EOSINOPHIL 1.5 07/19/2024    BASOPHIL 0.6 07/19/2024    DIFFMETHOD Automated 07/19/2024     .  Lab Results   Component Value Date    HGBA1C 5.2 07/19/2024       IMAGING:  All pertinent imaging has been reviewed and interpreted independently.    Venous US 9/2023 Impression:  FINDINGS:  Deep venous system:     There is evidence of flow, compressibility and augmentation within bilateral common femoral, femoral, and popliteal veins.  Flow is seen within bilateral peroneal, posterior tibial and anterior tibial veins.     Superficial venous system:     Right leg:     Reflux times greater saphenous vein up to 4433 milliseconds.     The maximal diameter within the right greater saphenous vein is 6 mm.     The maximal " diameter within the right lesser saphenous vein is 4mm.     Left leg:     Reflux times greater saphenous vein up to 3252 milliseconds.     The maximal diameter within the left greater saphenous vein is 7 mm.     The maximal diameter within the left lesser saphenous vein is 3mm.        Impression:     1.  No evidence of deep venous thrombosis in either lower extremity.     2.  Hemodynamically significant venous reflux right and left greater saphenous veins.       IMP/PLAN:  69 y.o. female with   Patient Active Problem List   Diagnosis    Thyroid nodule    History of Hashimoto thyroiditis    Environmental allergies    History of colon polyps    Mild vitamin D deficiency    Rectal mass    Pulmonary nodules    Colon polyp    Postmenopausal osteoporosis    Joint stiffness of left foot    Difficulty walking    being managed by PCP and specialists who is here today for evaluation of BLE edema, symptomatic varicose veins and asymptomatic spider veins.    -s/p BLE stab phlebectomy for varicose veins  -Future spider vein BLE sclerotherapy if patient desires  -recommend compression with Rx stockings, elevation, dietary changes associated with water and sodium intake discussed at length with patient  -Exercise   -RTC prn    I spent 12 minutes evaluating this patient and greater than 50% of the time was spent counseling, coordinator care and discussing the plan of care.  All questions were answered and patient stated understanding with agreement with the above treatment plan.    Vel Campos MD [facility]  Vascular and Endovascular Surgery

## 2024-09-05 ENCOUNTER — PATIENT MESSAGE (OUTPATIENT)
Dept: INTERNAL MEDICINE | Facility: CLINIC | Age: 69
End: 2024-09-05
Payer: MEDICARE

## 2024-09-05 ENCOUNTER — PATIENT MESSAGE (OUTPATIENT)
Dept: ADMINISTRATIVE | Facility: OTHER | Age: 69
End: 2024-09-05
Payer: MEDICARE

## 2024-09-05 ENCOUNTER — TELEPHONE (OUTPATIENT)
Dept: DERMATOLOGY | Facility: CLINIC | Age: 69
End: 2024-09-05
Payer: MEDICARE

## 2024-09-09 ENCOUNTER — IMMUNIZATION (OUTPATIENT)
Dept: INTERNAL MEDICINE | Facility: CLINIC | Age: 69
End: 2024-09-09
Payer: MEDICARE

## 2024-09-09 DIAGNOSIS — Z23 NEED FOR VACCINATION: Primary | ICD-10-CM

## 2024-09-09 PROCEDURE — 90662 IIV NO PRSV INCREASED AG IM: CPT | Mod: S$GLB,,, | Performed by: INTERNAL MEDICINE

## 2024-09-09 PROCEDURE — G0008 ADMIN INFLUENZA VIRUS VAC: HCPCS | Mod: S$GLB,,, | Performed by: INTERNAL MEDICINE

## 2024-09-19 ENCOUNTER — HOSPITAL ENCOUNTER (OUTPATIENT)
Dept: RADIOLOGY | Facility: HOSPITAL | Age: 69
Discharge: HOME | End: 2024-09-19
Attending: ORTHOPAEDIC SURGERY
Payer: MEDICARE

## 2024-09-19 ENCOUNTER — PATIENT MESSAGE (OUTPATIENT)
Dept: INTERNAL MEDICINE | Facility: CLINIC | Age: 69
End: 2024-09-19
Payer: MEDICARE

## 2024-09-19 ENCOUNTER — PATIENT MESSAGE (OUTPATIENT)
Dept: ORTHOPEDICS | Facility: CLINIC | Age: 69
End: 2024-09-19
Payer: MEDICARE

## 2024-09-19 DIAGNOSIS — S32.040A COMPRESSION FRACTURE OF L4 LUMBAR VERTEBRA, CLOSED, INITIAL ENCOUNTER: ICD-10-CM

## 2024-09-19 PROCEDURE — 72100 X-RAY EXAM L-S SPINE 2/3 VWS: CPT | Mod: TC

## 2024-09-19 PROCEDURE — 72100 X-RAY EXAM L-S SPINE 2/3 VWS: CPT | Mod: 26,,, | Performed by: RADIOLOGY

## 2024-09-24 ENCOUNTER — PATIENT MESSAGE (OUTPATIENT)
Dept: NEUROLOGY | Facility: CLINIC | Age: 69
End: 2024-09-24
Payer: MEDICARE

## 2024-10-02 ENCOUNTER — OFFICE VISIT (OUTPATIENT)
Dept: PODIATRY | Facility: CLINIC | Age: 69
End: 2024-10-02
Payer: MEDICARE

## 2024-10-02 VITALS
HEIGHT: 67 IN | HEART RATE: 59 BPM | BODY MASS INDEX: 31.15 KG/M2 | WEIGHT: 198.44 LBS | SYSTOLIC BLOOD PRESSURE: 111 MMHG | DIASTOLIC BLOOD PRESSURE: 69 MMHG

## 2024-10-02 DIAGNOSIS — M21.619 BUNION OF GREAT TOE: Primary | ICD-10-CM

## 2024-10-02 DIAGNOSIS — M79.672 PAIN IN BOTH FEET: ICD-10-CM

## 2024-10-02 DIAGNOSIS — M79.671 PAIN IN BOTH FEET: ICD-10-CM

## 2024-10-02 PROCEDURE — 1126F AMNT PAIN NOTED NONE PRSNT: CPT | Mod: CPTII,S$GLB,, | Performed by: PODIATRIST

## 2024-10-02 PROCEDURE — 3078F DIAST BP <80 MM HG: CPT | Mod: CPTII,S$GLB,, | Performed by: PODIATRIST

## 2024-10-02 PROCEDURE — 3074F SYST BP LT 130 MM HG: CPT | Mod: CPTII,S$GLB,, | Performed by: PODIATRIST

## 2024-10-02 PROCEDURE — 99024 POSTOP FOLLOW-UP VISIT: CPT | Mod: S$GLB,,, | Performed by: PODIATRIST

## 2024-10-02 PROCEDURE — 99999 PR PBB SHADOW E&M-EST. PATIENT-LVL IV: CPT | Mod: PBBFAC,,, | Performed by: PODIATRIST

## 2024-10-02 PROCEDURE — 1100F PTFALLS ASSESS-DOCD GE2>/YR: CPT | Mod: CPTII,S$GLB,, | Performed by: PODIATRIST

## 2024-10-02 PROCEDURE — 1160F RVW MEDS BY RX/DR IN RCRD: CPT | Mod: CPTII,S$GLB,, | Performed by: PODIATRIST

## 2024-10-02 PROCEDURE — 3044F HG A1C LEVEL LT 7.0%: CPT | Mod: CPTII,S$GLB,, | Performed by: PODIATRIST

## 2024-10-02 PROCEDURE — 1159F MED LIST DOCD IN RCRD: CPT | Mod: CPTII,S$GLB,, | Performed by: PODIATRIST

## 2024-10-02 PROCEDURE — 3288F FALL RISK ASSESSMENT DOCD: CPT | Mod: CPTII,S$GLB,, | Performed by: PODIATRIST

## 2024-10-07 ENCOUNTER — TELEPHONE (OUTPATIENT)
Dept: PODIATRY | Facility: CLINIC | Age: 69
End: 2024-10-07
Payer: MEDICARE

## 2024-10-07 NOTE — TELEPHONE ENCOUNTER
----- Message from Gilberto sent at 10/7/2024  2:42 PM CDT -----  Good Afternoon,    The physical therapy department received your order to get the attached patient scheduled for an evaluation. We have reached out to the patient to schedule her for an evaluation; however, we have been unsuccessful in reaching the patient.      We wanted you to be aware that your patient has not started therapy. If you speak with them again about starting therapy please have them reach out to us at [phone] to get scheduled?.      Sincerely,

## 2024-10-07 NOTE — TELEPHONE ENCOUNTER
Left Vm to inform Nano that Physical therapy was reaching out to her to assist with scheduling leaving call back number.

## 2024-10-13 NOTE — PROGRESS NOTES
Patient presents status post bunionectomy procedure.  She is doing well.  Mild to minimal discomfort.  Incision healing well.  No signs of infection or dehiscence.  Neurovascular status intact.  Good range of motion noted.  Good anatomical alignment surgical sites.

## 2024-10-22 ENCOUNTER — PROCEDURE VISIT (OUTPATIENT)
Dept: NEUROLOGY | Facility: CLINIC | Age: 69
End: 2024-10-22
Payer: MEDICARE

## 2024-10-22 DIAGNOSIS — G12.29 UPPER MOTOR NEURON LESION: ICD-10-CM

## 2024-10-22 PROCEDURE — 95913 NRV CNDJ TEST 13/> STUDIES: CPT | Mod: S$GLB,,, | Performed by: PHYSICAL MEDICINE & REHABILITATION

## 2024-10-22 PROCEDURE — 95886 MUSC TEST DONE W/N TEST COMP: CPT | Mod: S$GLB,,, | Performed by: PHYSICAL MEDICINE & REHABILITATION

## 2024-10-22 NOTE — PROCEDURES
Test Date:  10/22/2024    Patient: michael yin : 1955 Physician: Raffi Valerio D.O.   ID#: [id number] SEX: Female Ref. Phys: Sudarshan Richards MD     HPI: Michael Yin is a 69 y.o.female who presents for NCS/EMG to evaluate for large fiber peripheral polyneuropathy as cause for ascending numbness in the bilateral LE (LLE>RLE) and more recently numbness in the fingers as well.      PROCEDURE:  Prior to the procedure, the procedure was discussed in detail with the patient.  All questions were answered, and verbal consent was obtained.  For nerve conduction studies, a combination of surface electrodes, bar electrodes, and/or ring electrodes were used as needed.  For needle EMG, each site was cleaned and prepped in usual fashion with an alcohol pad.  A monopolar needle (28G) was used.  There was no significant bleeding, and bandages were applied as needed.  The procedure was tolerated without adverse effect.  The patient was instructed on post-procedure care including ice if needed for 10-15 minutes up to 4 times/day for any sore muscles.  I discussed with the patient that the data would be reviewed and a report sent to the referring provider, where any follow up questions regarding next steps should be directed.        NCV & EMG Findings:  All nerve conduction studies (as indicated in the following tables) were within normal limits.  All examined muscles (as indicated in the following table) showed no evidence of electrical instability.      IMPRESSIONS:  This is a normal electrophysiologic study of the bilateral lower extremities.  There is no electrodiagnostic evidence of a large fiber peripheral polyneuropathy.            ___________________________  Raffi Valerio D.O.        NCS+  Motor Nerve Results      Latency Amplitude F-Lat Segment Distance CV Comment   Site (ms) Norm (mV) Norm (ms)  (cm) (m/s) Norm    Right Median (APB)   Wrist 2.9  < 4.4 7.4  > 3.8         Elbow 7.1 - 5.3 -   Elbow-Wrist 24 57  > 51    Right Ulnar (ADM)   Wrist 2.4  < 3.7 8.1  > 3.0         Bel Elbow 7.3 - 6.4 -  Bel Elbow-Wrist 26 53  > 52    Left Fibular (EDB)   Ankle 3.6  < 6.5 3.3  > 1.10         Bel Fib Head 11.3 - 2.9 -  Bel Fib Head-Ankle 38 49 -    Pop Fossa 12.9 - 3.2 -  Pop Fossa-Bel Fib Head 10 63  > 42    Right Fibular (EDB)   Ankle 3.1  < 6.5 2.6  > 1.10         Bel Fib Head 10.5 - 2.5 -  Bel Fib Head-Ankle 36 49 -    Pop Fossa 12.2 - 3.1 -  Pop Fossa-Bel Fib Head 10 59  > 42    Left Tibial (AHB)   Ankle 4.1  < 6.1 7.2  > 1.10         Right Tibial (AHB)   Ankle 2.8  < 6.1 6.8  > 1.10           Sensory Nerve Results      Start Lat Latency (Peak) Amplitude (P-P) Segment Distance Start CV Comment   Site (ms) Norm (ms) (µV) Norm  (cm) (m/s) Norm    Right Median (Rec:Dig II)   Wrist 2.3  < 3.3 3.2 22  > 8 Wrist-Dig II 14 61  > 42    Right Ulnar (Rec:Dig V)   Wrist 2.4  < 3.1 3.1 25  > 4 Wrist-Dig V 14 58  > 45    Right Radial (Rec:Wrist)   Forearm 1.35  < 2.2 1.85 14  > 11 Forearm-Wrist 10 74 -    Left Sural (Rec:Lat Mall)   Calf 1.38  < 3.6 2.2 11  > 4 Calf-Lat Mall 14 101  > 39    Right Sural (Rec:Lat Mall)   Calf 1.38  < 3.6 2.3 20  > 4 Calf-Lat Mall 14 101  > 39    Left Superficial Fibular (Rec:Ankle)   14 cm 1.85 - 2.6 11  > 5 14 cm-Ankle 14 76  > 32    Right Superficial Fibular (Rec:Ankle)   14 cm 2.2 - 2.6 15  > 5 14 cm-Ankle 14 64  > 32      EMG+     Side Muscle Nerve Root Ins Act Fibs Psw Amp Dur Poly Recrt Int Pat Comment   Right Vastus Med Femoral L2-L4 Nml Nml Nml Nml Nml 0 Nml Nml    Right Tib Anterior Fibular,  Deep Fibula... L4-L5 Nml Nml Nml Nml Nml 0 Nml Nml    Right Fib Longus Fibular,  Superficial... L5-S1 Nml Nml Nml Nml Nml 0 Nml Nml    Right Gastroc Tibial S1-S2 Nml Nml Nml Nml Nml 0 Nml Nml    Right Dorsal Interossei ped I Lateral Plantar S2-S3 Nml Nml Nml Nml Nml 0 Nml Nml    Left Vastus Med Femoral L2-L4 Nml Nml Nml Nml Nml 0 Nml Nml    Left Tib Anterior Fibular,  Deep Fibula... L4-L5 Nml  Nml Nml Nml Nml 0 Nml Nml    Left Fib Longus Fibular,  Superficial... L5-S1 Nml Nml Nml Nml Nml 0 Nml Nml    Left Gastroc Tibial S1-S2 Nml Nml Nml Nml Nml 0 Nml Nml    Left Dorsal Interossei ped I Lateral Plantar S2-S3 Nml Nml Nml Nml Nml 0 Nml Nml            Waveforms:    Motor                  Sensory

## 2024-11-04 ENCOUNTER — PATIENT MESSAGE (OUTPATIENT)
Dept: ADMINISTRATIVE | Facility: OTHER | Age: 69
End: 2024-11-04
Payer: MEDICARE

## 2024-11-06 ENCOUNTER — IMMUNIZATION (OUTPATIENT)
Dept: INTERNAL MEDICINE | Facility: CLINIC | Age: 69
End: 2024-11-06
Payer: MEDICARE

## 2024-11-06 ENCOUNTER — OFFICE VISIT (OUTPATIENT)
Dept: NEUROLOGY | Facility: CLINIC | Age: 69
End: 2024-11-06
Payer: MEDICARE

## 2024-11-06 VITALS
SYSTOLIC BLOOD PRESSURE: 132 MMHG | HEART RATE: 56 BPM | BODY MASS INDEX: 31.94 KG/M2 | WEIGHT: 203.94 LBS | DIASTOLIC BLOOD PRESSURE: 76 MMHG

## 2024-11-06 DIAGNOSIS — M54.16 LUMBAR RADICULOPATHY: Primary | ICD-10-CM

## 2024-11-06 DIAGNOSIS — Z23 NEED FOR VACCINATION: Primary | ICD-10-CM

## 2024-11-06 PROCEDURE — 91320 SARSCV2 VAC 30MCG TRS-SUC IM: CPT | Mod: S$GLB,,, | Performed by: INTERNAL MEDICINE

## 2024-11-06 PROCEDURE — 3078F DIAST BP <80 MM HG: CPT | Mod: CPTII,S$GLB,, | Performed by: STUDENT IN AN ORGANIZED HEALTH CARE EDUCATION/TRAINING PROGRAM

## 2024-11-06 PROCEDURE — 99999 PR PBB SHADOW E&M-EST. PATIENT-LVL III: CPT | Mod: PBBFAC,,, | Performed by: STUDENT IN AN ORGANIZED HEALTH CARE EDUCATION/TRAINING PROGRAM

## 2024-11-06 PROCEDURE — 3044F HG A1C LEVEL LT 7.0%: CPT | Mod: CPTII,S$GLB,, | Performed by: STUDENT IN AN ORGANIZED HEALTH CARE EDUCATION/TRAINING PROGRAM

## 2024-11-06 PROCEDURE — 90480 ADMN SARSCOV2 VAC 1/ONLY CMP: CPT | Mod: S$GLB,,, | Performed by: INTERNAL MEDICINE

## 2024-11-06 PROCEDURE — 1125F AMNT PAIN NOTED PAIN PRSNT: CPT | Mod: CPTII,S$GLB,, | Performed by: STUDENT IN AN ORGANIZED HEALTH CARE EDUCATION/TRAINING PROGRAM

## 2024-11-06 PROCEDURE — 3008F BODY MASS INDEX DOCD: CPT | Mod: CPTII,S$GLB,, | Performed by: STUDENT IN AN ORGANIZED HEALTH CARE EDUCATION/TRAINING PROGRAM

## 2024-11-06 PROCEDURE — 1159F MED LIST DOCD IN RCRD: CPT | Mod: CPTII,S$GLB,, | Performed by: STUDENT IN AN ORGANIZED HEALTH CARE EDUCATION/TRAINING PROGRAM

## 2024-11-06 PROCEDURE — 1101F PT FALLS ASSESS-DOCD LE1/YR: CPT | Mod: CPTII,S$GLB,, | Performed by: STUDENT IN AN ORGANIZED HEALTH CARE EDUCATION/TRAINING PROGRAM

## 2024-11-06 PROCEDURE — 99213 OFFICE O/P EST LOW 20 MIN: CPT | Mod: S$GLB,,, | Performed by: STUDENT IN AN ORGANIZED HEALTH CARE EDUCATION/TRAINING PROGRAM

## 2024-11-06 PROCEDURE — 3075F SYST BP GE 130 - 139MM HG: CPT | Mod: CPTII,S$GLB,, | Performed by: STUDENT IN AN ORGANIZED HEALTH CARE EDUCATION/TRAINING PROGRAM

## 2024-11-06 PROCEDURE — 3288F FALL RISK ASSESSMENT DOCD: CPT | Mod: CPTII,S$GLB,, | Performed by: STUDENT IN AN ORGANIZED HEALTH CARE EDUCATION/TRAINING PROGRAM

## 2024-11-06 NOTE — PROGRESS NOTES
[facility]  Clinic Note    Date of Service: 11/6/2024  Patient seen at the request of: No ref. provider found    Reason for Consultation  Fluctuating numbness in her distal lower extremities    Assessment:  Nano Sosa is a 69 y.o. female who presents with fluctuating, ascending numbness in the bilateral, distal, lower extremities.    Patient presents with a year-long history of distal lower extremity numbness that occurs for minutes once a day.  Initially only affecting the bilateral feet, numbness has extended to include the anterior shins, below-the-knee.  Patient denies any focal pain or weakness.  Patient had a mechanical fall 1 week ago.  Fall was associated with a lumbar compression fracture seen on x-ray at L4.  On exam, patient has hyperreflexia in right patellar reflex associated with the L3 and L4 nerve roots.      MRI brain - normal  MRI lumbar - L4-5 degenerative changes with spondylolisthesis and severe canal narrowing and moderate bilateral foraminal narrowing  EMG/NCS - This is a normal electrophysiologic study of the bilateral lower extremities.  There is no electrodiagnostic evidence of a large fiber peripheral polyneuropathy.    B12, B1, MMA, copper, TSH, A1c normal    Patient's symptoms are most likely secondary to her lumbar disease.      Plan:    - continue with PT (core and back)  - follow up with orthopaedics as necessary      - RTC as needed      Signed:    Sudarshan Richards MD  Neurology/Epilepsy  11/06/2024 7:57 AM      HPI:  Nano Sosa is a 69 y.o. female with   Past Medical History:   Diagnosis Date    General anesthetics causing adverse effect in therapeutic use     wakes up hyperventilating/chills    Goiter     Hashimoto's disease     Migraine     Osteopenia     Rectal mass     Ulcerative colitis     Urinary tract infection     Vitamin D deficiency      Patient has a history of distal neuropathy after a bout of Covid in the summer of 2023.  At that point it was  fluctuating.  After recovering from foot surgery in March 2024 it worsened in severity.  Numbness in her feet is noticed once a day and lasts for a couple of minutes.  Overtime, she has noticed that it is extending superiorly.  Occasionally felt in the fingertips.  Described as a lack of feeling.  Does not feel unsteady on her feet when it occurs.  No tingling/burning.    Patient has a history of ulcerative colitis.  Takes mesalamine suppository.      Patient had a fall from a ladder last week.  Complicated by L4 compression fracture.      This is the extent of the patient's complaints at this time.      Review of Systems:  ROS negative unless noted in HPI    Past Surgical History:  Past Surgical History:   Procedure Laterality Date    BUNIONECTOMY      right    BUNIONECTOMY Left 3/15/2024    Procedure: BUNIONECTOMY;  Surgeon: Ethan Vaughn DPM;  Location: [facility];  Service: Podiatry;  Laterality: Left;    colon sx      COLONOSCOPY N/A 12/19/2016    Procedure: COLONOSCOPY;  Surgeon: Alli Peña MD;  Location: [facility] (4TH FLR);  Service: Endoscopy;  Laterality: N/A;    COLONOSCOPY N/A 1/7/2019    Procedure: COLONOSCOPY;  Surgeon: Vel Self MD;  Location: [facility] (4TH FLR);  Service: Endoscopy;  Laterality: N/A;  Pt requests Dr. Peña, Pt ok with Dr. Self    COLONOSCOPY N/A 2/13/2023    Procedure: COLONOSCOPY;  Surgeon: Srinivas Linares MD;  Location: [facility] (4TH FLR);  Service: Endoscopy;  Laterality: N/A;  instructions via [facility] -   pt request Sutab  [facility]  Precall complete- [place]    f.e.s.s.  12/5/2014    FACIAL COSMETIC SURGERY      FLEXIBLE SIGMOIDOSCOPY N/A 3/19/2024    Procedure: SIGMOIDOSCOPY, FLEXIBLE;  Surgeon: Srinivas Linares MD;  Location: [facility] ENDO (4TH FLR);  Service: Endoscopy;  Laterality: N/A;  2/16 r/s due to a change in doctor's randlal.,sent updated instr via portal.  Ref By: , instr sent via portal.   2/9-lvm for  "precall-MS  3/13-lvm for precall-MS  3/13-precall complete-Kpvt    FOOT HARDWARE REMOVAL Right 3/15/2024    Procedure: REMOVAL, HARDWARE, FOOT;  Surgeon: Ethan Vaughn DPM;  Location: [facility];  Service: Podiatry;  Laterality: Right;    TOTAL ABDOMINAL HYSTERECTOMY      ISIDRO       Family History:  Family History   Problem Relation Name Age of Onset    Rheum arthritis Mother      Migraines Mother      Breast cancer Maternal Aunt      Heart disease Maternal Uncle      Hypertension Maternal Uncle      Colon cancer Maternal Aunt      Anesthesia problems Neg Hx      Diabetes Neg Hx      Ovarian cancer Neg Hx         Social History:  Social History     Tobacco Use    Smoking status: Former     Current packs/day: 0.00     Average packs/day: 1 pack/day for 30.0 years (30.0 ttl pk-yrs)     Types: Cigarettes     Start date: 1969     Quit date: 1999     Years since quittin.2     Passive exposure: Never    Smokeless tobacco: Never   Substance Use Topics    Alcohol use: Yes     Alcohol/week: 6.0 standard drinks of alcohol     Types: 4 Glasses of wine, 2 Standard drinks or equivalent per week     Comment: weekends    Drug use: No       Allergies:  Penicillins, Poison ivy extract, Poison sumac extract, Sulfa (sulfonamide antibiotics), Codeine, Erythromycin, Tessalon [benzonatate], and Unable to assess    Outpatient Medications:  Prior to Admission medications    Medication Sig Start Date End Date Taking? Authorizing Provider   acetaminophen (TYLENOL) 325 MG tablet Take 2 tablets (650 mg total) by mouth every 6 (six) hours as needed for Pain. 23   Sudarshan Groves MD   b complex vitamins tablet Take 1 tablet by mouth once daily.    Provider, Historical   BD ULTRA-FINE POOJA PEN NEEDLE 32 gauge x 32" Ndle For Forteo - inject daily 24   Garrick Templeton MD   calcium carbonate (OS-CONRAD) 600 mg calcium (1,500 mg) Tab Take 600 mg by mouth once daily.    Provider, Historical   cholecalciferol, vitamin " D3, (VITAMIN D3) 50 mcg (2,000 unit) Cap capsule Take 4,000 Units by mouth once daily.    Provider, Historical   diclofenac sodium (VOLTAREN) 1 % Gel Apply 2 g topically 4 (four) times daily. 5/1/24   Ethan Vaughn DPM   ferrous sulfate (FEOSOL) 325 mg (65 mg iron) Tab tablet Take 1 tablet (325 mg total) by mouth once daily. 12/31/23 12/25/24  Lauren Fountain MD   fluticasone propionate (FLONASE) 50 mcg/actuation nasal spray by Each Nostril route. 5/24/24   Provider, Historical   mesalamine (CANASA) 1000 MG Supp Place 1 suppository (1,000 mg total) rectally nightly. 6/3/24   Srinivas Linares MD   multivitamin capsule Take 1 capsule by mouth once daily.    Provider, Historical   teriparatide (FORTEO) 20 mcg/dose (600mcg/2.4mL) PnIj Inject 0.08 mLs (20 mcg total) into the skin once daily. 8/13/24 8/13/25  Garrick Templeton MD       Physical exam:    Vitals: There were no vitals taken for this visit.    General:   Sitting in chair, in no distress, well-nourished, well-developed, appears stated age.  Head/Neck:   Normocephalic,atraumatic  Pulm:  Non-labored breathing     Mental Status: Alert and oriented to person, time, place, situation. Speech spontaneous and fluent without paraphasias; no dysarthria  CN:  II: visual fields full  III, IV, VI: EOM intact without nystagmus or diplopia.   V: Sensation to light touch full and symmetric in V1-3. Masseter contraction full bilaterally.   VII: Facial movement full and symmetric.   VIII: Hearing grossly normal to conversation.  IX, X: Palate midline with symmetric elevation.    XI: SCM and trapezius: 5/5 bilaterally.   XII: Tongue midline without fasciculations.  Motor: Normal bulk and tone throughout all four extremities.   RUE: D: 5/5; B: 5/5; T:  5/5; WF:5/5; WE:  5/5; IO: 5/5   LUE: D: 5/5; B: 5/5; T:  5/5; WF: 5/5; WE:  5/5; IO: 5/5   RLE: HF: 5/5, KE: 5/5, KF: 5/5, DF: 5/5, PF: 5/5  LLE: HF: 5/5, KE: 5/5, KF: 5/5, DF: 5/5, PF: 5/5  No tremors    Sensory: Intact and symmetric to light touch and vibration throughout.    Proprioception intact at the toes  Reflexes: RUE: Triceps 2+, biceps 2+, brachioradialis 2+  LUE: Triceps 2+, biceps 2+ brachioradialis 2+  RLE: Knee 3+, ankle 2+  LLE: Knee 2+, ankle 2+  Coordination:  Intact and symmetric to finger-to-nose and heel-to-shin.  Gait:  Intact to casual gait. Romberg negative.    Imaging:  All pertinent imaging was personally reviewed.      I spent a total of 21 minutes on the day of the visit. This includes face to face time and non-face to face time preparing to see the patient (eg, review of tests), obtaining and/or reviewing separately obtained history, documenting clinical information in the electronic or other health record, independently interpreting results and communicating results to the patient/family/caregiver, or care coordinator.

## 2024-11-12 ENCOUNTER — PATIENT MESSAGE (OUTPATIENT)
Dept: ORTHOPEDICS | Facility: CLINIC | Age: 69
End: 2024-11-12
Payer: MEDICARE

## 2024-11-13 ENCOUNTER — OFFICE VISIT (OUTPATIENT)
Dept: INTERNAL MEDICINE | Facility: CLINIC | Age: 69
End: 2024-11-13
Payer: MEDICARE

## 2024-11-13 VITALS
SYSTOLIC BLOOD PRESSURE: 131 MMHG | BODY MASS INDEX: 31.23 KG/M2 | WEIGHT: 199 LBS | OXYGEN SATURATION: 96 % | HEIGHT: 67 IN | DIASTOLIC BLOOD PRESSURE: 81 MMHG | HEART RATE: 65 BPM

## 2024-11-13 DIAGNOSIS — Z00.00 ENCOUNTER FOR ANNUAL PHYSICAL EXAM: Primary | ICD-10-CM

## 2024-11-13 PROCEDURE — 1125F AMNT PAIN NOTED PAIN PRSNT: CPT | Mod: CPTII,S$GLB,, | Performed by: INTERNAL MEDICINE

## 2024-11-13 PROCEDURE — 1159F MED LIST DOCD IN RCRD: CPT | Mod: CPTII,S$GLB,, | Performed by: INTERNAL MEDICINE

## 2024-11-13 PROCEDURE — 3288F FALL RISK ASSESSMENT DOCD: CPT | Mod: CPTII,S$GLB,, | Performed by: INTERNAL MEDICINE

## 2024-11-13 PROCEDURE — 99397 PER PM REEVAL EST PAT 65+ YR: CPT | Mod: S$GLB,,, | Performed by: INTERNAL MEDICINE

## 2024-11-13 PROCEDURE — 3044F HG A1C LEVEL LT 7.0%: CPT | Mod: CPTII,S$GLB,, | Performed by: INTERNAL MEDICINE

## 2024-11-13 PROCEDURE — 3008F BODY MASS INDEX DOCD: CPT | Mod: CPTII,S$GLB,, | Performed by: INTERNAL MEDICINE

## 2024-11-13 PROCEDURE — 3079F DIAST BP 80-89 MM HG: CPT | Mod: CPTII,S$GLB,, | Performed by: INTERNAL MEDICINE

## 2024-11-13 PROCEDURE — 3075F SYST BP GE 130 - 139MM HG: CPT | Mod: CPTII,S$GLB,, | Performed by: INTERNAL MEDICINE

## 2024-11-13 PROCEDURE — 1101F PT FALLS ASSESS-DOCD LE1/YR: CPT | Mod: CPTII,S$GLB,, | Performed by: INTERNAL MEDICINE

## 2024-11-13 PROCEDURE — 99999 PR PBB SHADOW E&M-EST. PATIENT-LVL IV: CPT | Mod: PBBFAC,,, | Performed by: INTERNAL MEDICINE

## 2024-11-13 NOTE — PROGRESS NOTES
Subjective:       Patient ID: Nano Sosa is a 69 y.o. female.    Chief Complaint: Annual Exam    HPI      PMHx:  Osteoporosis not currently on medications.   Neuropathy since 2023.     Health Maintenance:  Colon Cancer Screening: Colonoscopy in February 2023 with polyps removed. Due again 2028  Mammogram: scheduled for January 2025  Hep C: negative 2023   Lipids: normal 2023   Vaccines:  up to date     Review of Systems   Constitutional:  Negative for activity change, appetite change and chills.   HENT:  Negative for ear pain, sinus pressure/congestion and sneezing.    Respiratory:  Negative for cough and shortness of breath.    Cardiovascular:  Negative for chest pain, palpitations and leg swelling.   Gastrointestinal:  Negative for abdominal distention, abdominal pain, constipation, diarrhea, nausea and vomiting.   Genitourinary:  Negative for dysuria and hematuria.   Musculoskeletal:  Negative for arthralgias, back pain and myalgias.   Neurological:  Negative for dizziness and headaches.   Psychiatric/Behavioral:  Negative for agitation. The patient is not nervous/anxious.            Past Medical History:   Diagnosis Date    General anesthetics causing adverse effect in therapeutic use     wakes up hyperventilating/chills    Goiter     Hashimoto's disease     Migraine     Osteopenia     Rectal mass     Ulcerative colitis     Urinary tract infection     Vitamin D deficiency      Past Surgical History:   Procedure Laterality Date    BUNIONECTOMY      right    BUNIONECTOMY Left 3/15/2024    Procedure: BUNIONECTOMY;  Surgeon: Ethan Vaughn DPM;  Location: [facility];  Service: Podiatry;  Laterality: Left;    colon sx      COLONOSCOPY N/A 12/19/2016    Procedure: COLONOSCOPY;  Surgeon: Alli Peña MD;  Location: [facility] ([address] [place] [address]);  Service: Endoscopy;  Laterality: N/A;    COLONOSCOPY N/A 1/7/2019    Procedure: COLONOSCOPY;  Surgeon: Vel Self MD;  [facility]  Service:  Endoscopy;  Laterality: N/A;  Pt requests Dr. Peña, Pt ok with Dr. Self    COLONOSCOPY N/A 2/13/2023    Procedure: COLONOSCOPY;  Surgeon: Srinivas Linares MD;  Location: [facility] (4TH FLR);  Service: Endoscopy;  Laterality: N/A;  instructions via portal -   pt request Sutab  [facility]  Precall complete- [place]    f.e.s.s.  12/5/2014    FACIAL COSMETIC SURGERY      FLEXIBLE SIGMOIDOSCOPY N/A 3/19/2024    Procedure: SIGMOIDOSCOPY, FLEXIBLE;  Surgeon: Srinivas Linares MD;  Location: [facility] (4TH FLR);  Service: Endoscopy;  Laterality: N/A;  2/16 r/s due to a change in doctor's randall.,sent updated instr via portal.AC  Ref By: , instr sent via portal. AC  2/9-lvm for precall-MS  3/13-lvm for precall-MS  3/13-precall complete-Kpvt    FOOT HARDWARE REMOVAL Right 3/15/2024    Procedure: REMOVAL, HARDWARE, FOOT;  Surgeon: Ethan Vaughn DPM;  [facility];  Service: Podiatry;  Laterality: Right;    TOTAL ABDOMINAL HYSTERECTOMY  1994    [facility]      Patient Active Problem List   Diagnosis    Thyroid nodule    History of Hashimoto thyroiditis    Environmental allergies    History of colon polyps    Mild vitamin D deficiency    Rectal mass    Pulmonary nodules    Colon polyp    Postmenopausal osteoporosis    Joint stiffness of left foot    Difficulty walking        Objective:      Physical Exam  Constitutional:       Appearance: Normal appearance.   HENT:      Head: Normocephalic.      Right Ear: Tympanic membrane normal.      Left Ear: Tympanic membrane normal.      Nose: Nose normal.   Cardiovascular:      Rate and Rhythm: Normal rate and regular rhythm.      Pulses: Normal pulses.      Heart sounds: Normal heart sounds.   Pulmonary:      Effort: Pulmonary effort is normal.      Breath sounds: Normal breath sounds.   Abdominal:      General: Abdomen is flat. Bowel sounds are normal.      Palpations: Abdomen is soft.   Musculoskeletal:         General: Normal range of motion.       Cervical back: Normal range of motion and neck supple.   Skin:     General: Skin is warm and dry.   Neurological:      General: No focal deficit present.      Mental Status: She is alert and oriented to person, place, and time.   Psychiatric:         Mood and Affect: Mood normal.         Assessment:       Problem List Items Addressed This Visit    None        Plan:         Nano was seen today for annual exam.    Diagnoses and all orders for this visit:    Encounter for annual physical exam     Colon Cancer Screening: Colonoscopy in February 2023 with polyps removed. Due again 2028  Mammogram: scheduled for January 2025  Hep C: negative 2023   Lipids: normal 2023   Vaccines:  up to date   Annual wellness exam completed.     All medications, histories, and concerns reviewed, reconciled, and addressed.     Appropriate Screenings per pt's sex and age have been reviewed and discussed with pt.               Lauren Fountain MD   Internal Medicine   Primary Care

## 2024-12-01 ENCOUNTER — PATIENT MESSAGE (OUTPATIENT)
Dept: ADMINISTRATIVE | Facility: OTHER | Age: 69
End: 2024-12-01
Payer: MEDICARE

## 2024-12-10 ENCOUNTER — PATIENT MESSAGE (OUTPATIENT)
Dept: GASTROENTEROLOGY | Facility: CLINIC | Age: 69
End: 2024-12-10
Payer: MEDICARE

## 2024-12-10 ENCOUNTER — PATIENT MESSAGE (OUTPATIENT)
Dept: INTERNAL MEDICINE | Facility: CLINIC | Age: 69
End: 2024-12-10
Payer: MEDICARE

## 2024-12-31 DIAGNOSIS — K51.90 ULCERATIVE COLITIS WITHOUT COMPLICATIONS, UNSPECIFIED LOCATION: Primary | ICD-10-CM

## 2025-01-03 ENCOUNTER — PATIENT MESSAGE (OUTPATIENT)
Dept: ADMINISTRATIVE | Facility: OTHER | Age: 70
End: 2025-01-03
Payer: MEDICARE

## 2025-01-03 ENCOUNTER — HOSPITAL ENCOUNTER (OUTPATIENT)
Dept: RADIOLOGY | Facility: HOSPITAL | Age: 70
Discharge: HOME | End: 2025-01-03
Attending: INTERNAL MEDICINE
Payer: MEDICARE

## 2025-01-03 DIAGNOSIS — Z12.31 ENCOUNTER FOR SCREENING MAMMOGRAM FOR BREAST CANCER: ICD-10-CM

## 2025-01-03 PROCEDURE — 77063 BREAST TOMOSYNTHESIS BI: CPT | Mod: 26,,, | Performed by: RADIOLOGY

## 2025-01-03 PROCEDURE — 77067 SCR MAMMO BI INCL CAD: CPT | Mod: 26,,, | Performed by: RADIOLOGY

## 2025-01-03 PROCEDURE — 77063 BREAST TOMOSYNTHESIS BI: CPT | Mod: TC

## 2025-01-06 ENCOUNTER — LAB VISIT (OUTPATIENT)
Dept: LAB | Facility: HOSPITAL | Age: 70
End: 2025-01-06
Attending: INTERNAL MEDICINE
Payer: MEDICARE

## 2025-01-06 DIAGNOSIS — K51.90 ULCERATIVE COLITIS WITHOUT COMPLICATIONS, UNSPECIFIED LOCATION: ICD-10-CM

## 2025-01-06 PROCEDURE — 83993 ASSAY FOR CALPROTECTIN FECAL: CPT | Performed by: INTERNAL MEDICINE

## 2025-01-07 ENCOUNTER — PATIENT MESSAGE (OUTPATIENT)
Dept: ENDOCRINOLOGY | Facility: CLINIC | Age: 70
End: 2025-01-07
Payer: MEDICARE

## 2025-01-07 DIAGNOSIS — M81.0 OSTEOPOROSIS, UNSPECIFIED OSTEOPOROSIS TYPE, UNSPECIFIED PATHOLOGICAL FRACTURE PRESENCE: Primary | ICD-10-CM

## 2025-01-07 RX ORDER — TERIPARATIDE 250 UG/ML
20 INJECTION, SOLUTION SUBCUTANEOUS DAILY
Qty: 2.48 ML | Refills: 11 | Status: ACTIVE | OUTPATIENT
Start: 2025-01-07

## 2025-01-09 ENCOUNTER — PATIENT MESSAGE (OUTPATIENT)
Dept: GASTROENTEROLOGY | Facility: CLINIC | Age: 70
End: 2025-01-09

## 2025-01-09 ENCOUNTER — OFFICE VISIT (OUTPATIENT)
Dept: GASTROENTEROLOGY | Facility: CLINIC | Age: 70
End: 2025-01-09
Payer: MEDICARE

## 2025-01-09 ENCOUNTER — TELEPHONE (OUTPATIENT)
Dept: ENDOSCOPY | Facility: HOSPITAL | Age: 70
End: 2025-01-09
Payer: MEDICARE

## 2025-01-09 VITALS
HEIGHT: 67 IN | BODY MASS INDEX: 33.05 KG/M2 | HEART RATE: 58 BPM | OXYGEN SATURATION: 98 % | WEIGHT: 210.56 LBS | TEMPERATURE: 98 F

## 2025-01-09 DIAGNOSIS — K51.90 ULCERATIVE COLITIS WITHOUT COMPLICATIONS, UNSPECIFIED LOCATION: Primary | ICD-10-CM

## 2025-01-09 LAB — CALPROTECTIN STL-MCNT: 14.7 MCG/G

## 2025-01-09 PROCEDURE — 99214 OFFICE O/P EST MOD 30 MIN: CPT | Mod: S$GLB,,, | Performed by: INTERNAL MEDICINE

## 2025-01-09 PROCEDURE — 1100F PTFALLS ASSESS-DOCD GE2>/YR: CPT | Mod: CPTII,S$GLB,, | Performed by: INTERNAL MEDICINE

## 2025-01-09 PROCEDURE — 3288F FALL RISK ASSESSMENT DOCD: CPT | Mod: CPTII,S$GLB,, | Performed by: INTERNAL MEDICINE

## 2025-01-09 PROCEDURE — G2211 COMPLEX E/M VISIT ADD ON: HCPCS | Mod: S$GLB,,, | Performed by: INTERNAL MEDICINE

## 2025-01-09 PROCEDURE — 1126F AMNT PAIN NOTED NONE PRSNT: CPT | Mod: CPTII,S$GLB,, | Performed by: INTERNAL MEDICINE

## 2025-01-09 PROCEDURE — 3008F BODY MASS INDEX DOCD: CPT | Mod: CPTII,S$GLB,, | Performed by: INTERNAL MEDICINE

## 2025-01-09 RX ORDER — LANOLIN ALCOHOL/MO/W.PET/CERES
1 CREAM (GRAM) TOPICAL
COMMUNITY

## 2025-01-09 NOTE — TELEPHONE ENCOUNTER
"Referral for procedure from [facility]      Spoke to pt to schedule procedure(s) Flexible Sigmoidoscopy (Flex Sig)       Physician to perform procedure(s) Dr. DOMINICK Linares  Date of Procedure (s) 4/1/25  Arrival Time 12:30 PM  Time of Procedure(s) 1:30 PM   Location of Procedure(s) [address]  Type of Rx Prep sent to patient: Enema  Instructions provided to patient via [facility]    Patient was informed on the following information and verbalized understanding. Screening questionnaire reviewed with patient and complete. If procedure requires anesthesia, a responsible adult needs to be present to accompany the patient home, patient cannot drive after receiving anesthesia. Appointment details are tentative, especially check-in time. Patient will receive a prep-op call 7 days prior to confirm check-in time for procedure. If applicable the patient should contact their pharmacy to verify Rx for procedure prep is ready for pick-up. Patient was advised to call the scheduling department at [phone] if pharmacy states no Rx is available. Patient was advised to call the endoscopy scheduling department if any questions or concerns arise.       Endoscopy Scheduling Department        Zuri Bazan RN  P [facility] Endoscopist Clinic Patients; Srinivas Linares MD  Caller: Unspecified (Today,  8:17 AM)  Procedure: Flexsig    Diagnosis: Ulcerative colitis    Procedure Timing: March or April    *If within 4 weeks selected, please vicente as high priority*    *If greater than 12 weeks, please select "5-12 weeks" and delay sending until 3 months prior to requested date*    Location: Any Site    Additional Scheduling Information: No scheduling concerns    Prep Specifications:Standard prep - enemas    Is the patient taking a GLP-1 Agonist:no    Have you attached a patient to this message: yes  "

## 2025-01-09 NOTE — PROGRESS NOTES
[facility]             Inflammatory Bowel Disease   Follow-up  Note              TODAY'S VISIT DATE:  1/9/2025    Chief Complaint:   Chief Complaint   Patient presents with    Ulcerative Colitis     PCP: Lauren Fountain    Previous History:  Nano Sosa is a 69 y.o. female with ulcerative colitis (unclear distribution, rectum/possible left colon) S/P TAMIS of rectal adenoma and sporadic colon adenomas, Hashimito's thyroiditis, migraines, osteoporosis.  She was doing well until 2004 when she had bloody diarrhea and had a colonoscopy and diagnosed with left sided ulcerative colitis and was prescribed oral steroids and a suppository though pt concerned about taking medicine and did not take this.  In 2004 in [place] she met with a holistic doctor and she was given a expensive/extensive regimen and it resolved all of her symptoms within 6-8 mos. She attributes her initial symptoms within 6-12 mos after increased stress related to take care of elderly parent.   She then was taken care of by Dr. Quesada at [facility] and transferred her care to [facility] in 2011 and saw Dr. Miller at which time she was having 2-3 BMs/d with occ BRB painless on TP.   Colonoscopy 11/2014 c/w localized mild inflammation with edema and granularity in the rectum with 3 cm rectal polyp 10 cm proximal to anus that was biopsied and c/w tubular adenoma and rectal biopsies c/w mild chronic active inflammation with remaind er of the colon and TI endoscopically normal. On 12/24/14, 7/20/16, Dr. Peña did transanal endoscopic rectal resection (TAMIS) of the polyp. Colonoscopy 12/19/16 showed 4 mm sigmoid tubular adenoma and 6 mm rectal tubular adenoma both removed completely with jumbo forceps. Remainder of the colon was normal though not biopsies taken. Colonoscopy 1/7/19 c/w 5 mm flat sigmoid tubular adenoma removed with hot snare,  cecum lipoma and post- polypectomy mid rectal scar. I met patient for first  time for open access surveillance colonoscopy 23 at which time there was some patchy erytehma with punctate hemorrhage in the distal 5 cm of the rectum, localized hemicircumferential scarring 10 cm from anal verge with remainder of the colon normal. There were 2 small diminutive polyps removed from the descending and ascending colon (tubular adenomas), TI normal. Rectal biopsies reactive/regenerative changes.In 3/2024 colonoscopy significant for moderate distal 5 cm of proctitis with some chronic mucosal changes in the remainder of the rectum up to 20 cm and o/w normal colon.  Stool calprotectin 3/2024 609 and after canasa supp qhs on 24 normal 41. On 24 canasa decreased from qhs to qod.    Interval History:  - recent IBD meds:  canasa supp qod (started qhs 3/20/24, 24 qod, stopped 24)  - 2-3 formed BMs/d, no blood/urgency/nocturnal BMs  - stool calprotectin: 3/28/24 609, 24 41, 24 <5, 25 in process   - NSAID use: No  - Narcotic use: No  - Alternative/complementary meds for IBD: Probiotics     Prior Pertinent Surgeries:   None    Last pertinent Endoscopy/Imagin23 colonoscopy: some patchy erytehma with punctate hemorrhage in the distal 5 cm of the rectum, localized hemicircumferential scarring 10 cm from anal verge with remainder of the colon normal. There were 2 small diminutive polyps removed from the descending and ascending colon (tubular adenomas), TI normal. Rectal biopsies reactive/regenerative changes  3/19/24 colonoscopy: from anal verge to 5 cm there is moderate inflammation characterized by erythema, granularity, friability and mucoid exudate with small  ulcerations. From 5 cm to 20 cm there is some mucosal scarring with decreased vasculature. From 20 cm to the left transverse colon the mucosa is normal. Biopsies proximal rectum with mild architectural distortion, distal rectum with chronic proctitis with moderate to severe activity.     Therapeutic Drug  Monitoring Labs:  NA    Prior IBD Therapies:  Holistic treatments- specifics not know- effective     Vaccinations:  Lab Results   Component Value Date    HEPBSURFABQU Negative 03/07/2023    HEPBSURFABQU <3 03/07/2023     Lab Results   Component Value Date    HEPAIGG Reactive 03/07/2023     Lab Results   Component Value Date    VARICELLAZOS >4000.00 03/07/2023    VARICELLAINT Positive 03/07/2023     Lab Results   Component Value Date    MUMPSIGGSCRE 13.50 03/07/2023    MUMPSIGGINTE Positive 03/07/2023      Lab Results   Component Value Date    RUBEOLAIGGAN >300.00 03/07/2023    RUBEOLAINTER Positive 03/07/2023     Immunization History   Administered Date(s) Administered    COVID-19, MRNA, LN-S, PF (Pfizer) (Gray Cap) 05/25/2022    COVID-19, MRNA, LN-S, PF (Pfizer) (Purple Cap) 02/22/2021, 03/15/2021, 10/07/2021    COVID-19, mRNA, LNP-S, PF (Moderna) Ages 12+ 12/20/2023    COVID-19, mRNA, LNP-S, PF, rosie-sucrose, 30 mcg/0.3 mL (Pfizer Ages 12+) 11/06/2024    COVID-19, mRNA, LNP-S, bivalent booster, PF (PFIZER OMICRON) 10/06/2022    Hepatitis B (recombinant) Adjuvanted, 2 dose 06/03/2024, 07/01/2024    Influenza (FLUAD) - Quadrivalent - Adjuvanted - PF *Preferred* (65+) 10/12/2020, 10/07/2021, 10/06/2022, 10/18/2023    Influenza - Trivalent - Afluria, Fluzone MDV 01/29/2013    Influenza - Trivalent - Fluzone High Dose - PF (65 years and older) 09/09/2024    Influenza Split 01/29/2013    Pneumococcal Conjugate - 13 Valent 10/12/2020    Pneumococcal Polysaccharide - 23 Valent 05/18/2022    Rsv, Bivalent, Rsvpref (Abrysvo) 01/03/2025    Tdap 02/29/2020    Zoster 12/29/2015    Zoster Recombinant 08/16/2022, 10/31/2022   Flu shot: recommended yearly   COVID vaccine/booster:  per CDC recommendations  Tetanus (Tdap):  every 10 years, due 2/2030  PCV 20: 5/2027  HPV:  NA  Meningococcal:  NA  Hepatitis B: HBsAb with next labs     Review of Systems   Constitutional:  Negative for chills, fever and weight loss.   HENT:           "No oral ulcers, dysphagia, oral thrush   Eyes:  Negative for blurred vision, pain and redness.   Respiratory:  Negative for cough and shortness of breath.    Cardiovascular:  Negative for chest pain.   Gastrointestinal:  Negative for abdominal pain, heartburn, nausea and vomiting.   Genitourinary:  Negative for dysuria and hematuria.   Musculoskeletal:  Negative for back pain and joint pain.   Skin:  Negative for rash.   Psychiatric/Behavioral:  Negative for depression. The patient is not nervous/anxious and does not have insomnia.      All Medical History/Surgical History/Family History/Social History/Allergies have been reviewed and updated in EMR    Outpatient Medications Marked as Taking for the 1/9/25 encounter (Office Visit) with Srinivas Linares MD   Medication Sig Dispense Refill    acetaminophen (TYLENOL) 325 MG tablet Take 2 tablets (650 mg total) by mouth every 6 (six) hours as needed for Pain. 30 tablet 0    b complex vitamins tablet Take 1 tablet by mouth once daily.      BD ULTRA-FINE POOJA PEN NEEDLE 32 gauge x 5/32" Ndle For Forteo - inject daily 100 each 3    calcium carbonate (OS-CONRAD) 600 mg calcium (1,500 mg) Tab Take 600 mg by mouth once daily.      cholecalciferol, vitamin D3, (VITAMIN D3) 50 mcg (2,000 unit) Cap capsule Take 4,000 Units by mouth once daily.      diclofenac sodium (VOLTAREN) 1 % Gel Apply 2 g topically 4 (four) times daily. 100 g 2    fluticasone propionate (FLONASE) 50 mcg/actuation nasal spray by Each Nostril route.      multivitamin capsule Take 1 capsule by mouth once daily.       Vital Signs:  There were no vitals taken for this visit.  Physical Exam  Abdominal:      General: Bowel sounds are normal. There is no distension.      Palpations: Abdomen is soft.      Tenderness: There is no abdominal tenderness.         Labs:   Lab Results   Component Value Date    CALPROTECTIN <5.0 08/27/2024     Lab Results   Component Value Date    TBGOLDPLUS Negative 03/07/2023     Lab " Results   Component Value Date    PAJAYDBE89TV 48 03/07/2023    ENUXVKBN36 673 07/19/2024     Lab Results   Component Value Date    WBC 6.68 07/19/2024    HGB 12.3 07/19/2024    HCT 38.7 07/19/2024    MCV 97 07/19/2024     07/19/2024     Lab Results   Component Value Date    CREATININE 0.8 07/19/2024    ALBUMIN 3.8 07/19/2024    BILITOT 0.6 07/19/2024    ALKPHOS 69 07/19/2024    AST 18 07/19/2024    ALT 15 07/19/2024     Assessment/Plan:  Nano Sosa is a 69 y.o. female with ulcerative colitis (unclear distribution, rectum/possible left colon) S/P TAMIS of rectal adenoma and sporadic colon adenomas, Hashimito's thyroiditis, migraines, osteoporosis.     Patient has tapered off of canasa on her own in early 12/2024 with no recurrent proctitis symptoms and stool calprotectin 1/6/25 in process.  We discussed importance of close monitoring of symptoms and repeat stool calprotectin along with flex sig yearly and colonoscopy less frequently due to dysplasia in setting of colitis in the past and sporadic tubular adenomas.     # Ulcerative colitis (unclear distribution, rectum/possible left colon) S/P TAMIS of rectal adenoma and sporadic colon adenomas    - continue canasa qod  - flex sig March or April 2025  - colonoscopy 2/2026  - stool calprotectin 1/6/25 ini process   - drug monitoring labs: CBC/CMP yearly (7/2025), TB quantiferon (neg 3/2023), Hep B testing (pt will get done when she gets annual PCP labs, orders placed)    # High risk CRC- dysplasia in setting of active colitis, sporadic adenomas as well  - flex sig yearly- 3/2025  - colonoscopy q 3-5 years    # IBD specific health maintenance:  Skin exam- yearly  Osteoporosis- postmenopausal, followed by endocrinology  Pap smear- defer to GYN  Vitamin D- normal 3/2024, continue vit D3 4000 IU/d  Vaccines- HBsAb with next labs 7/2025- pt will get done when she gets annual PCP labs, orders placed     Visit today is associated with current or anticipated  ongoing medical care related to this patient's single serious condition/complex condition ulcerative colitis .     Follow up in about 1 year (around 1/9/2026) for in person.    Srinivas Linares MD  Department of Gastroenterology  Medical Director, Inflammatory Bowel Disease

## 2025-01-09 NOTE — PATIENT INSTRUCTIONS
- flex sig March or April 2025- standard prep- enemas  - make sure when you get routine labs from PCP 7/2025 have them add labs I ordered under my name  - give pt 2 stool kits for calprotectin

## 2025-01-30 DIAGNOSIS — Z00.00 ENCOUNTER FOR MEDICARE ANNUAL WELLNESS EXAM: ICD-10-CM

## 2025-02-06 NOTE — TELEPHONE ENCOUNTER
LVM1   Quality 431: Preventive Care And Screening: Unhealthy Alcohol Use - Screening: Patient not identified as an unhealthy alcohol user when screened for unhealthy alcohol use using a systematic screening method Quality 130: Documentation Of Current Medications In The Medical Record: Current Medications Documented Detail Level: Detailed Quality 47: Advance Care Plan: Advance Care Planning discussed and documented; advance care plan or surrogate decision maker documented in the medical record. Quality 226: Preventive Care And Screening: Tobacco Use: Screening And Cessation Intervention: Patient screened for tobacco use and is an ex/non-smoker

## 2025-02-13 ENCOUNTER — OFFICE VISIT (OUTPATIENT)
Dept: INTERNAL MEDICINE | Facility: CLINIC | Age: 70
End: 2025-02-13
Payer: MEDICARE

## 2025-02-13 VITALS
BODY MASS INDEX: 32.53 KG/M2 | HEART RATE: 59 BPM | DIASTOLIC BLOOD PRESSURE: 72 MMHG | OXYGEN SATURATION: 98 % | WEIGHT: 207.25 LBS | SYSTOLIC BLOOD PRESSURE: 122 MMHG | HEIGHT: 67 IN

## 2025-02-13 DIAGNOSIS — R91.1 SOLITARY PULMONARY NODULE: ICD-10-CM

## 2025-02-13 DIAGNOSIS — E55.9 MILD VITAMIN D DEFICIENCY: ICD-10-CM

## 2025-02-13 DIAGNOSIS — E04.1 THYROID NODULE: ICD-10-CM

## 2025-02-13 DIAGNOSIS — R91.8 PULMONARY NODULES: ICD-10-CM

## 2025-02-13 DIAGNOSIS — M81.0 POSTMENOPAUSAL OSTEOPOROSIS: ICD-10-CM

## 2025-02-13 DIAGNOSIS — K51.90 ULCERATIVE COLITIS WITHOUT COMPLICATIONS, UNSPECIFIED LOCATION: ICD-10-CM

## 2025-02-13 DIAGNOSIS — Z00.00 ENCOUNTER FOR PREVENTIVE HEALTH EXAMINATION: Primary | ICD-10-CM

## 2025-02-13 DIAGNOSIS — Z00.00 ENCOUNTER FOR MEDICARE ANNUAL WELLNESS EXAM: ICD-10-CM

## 2025-02-13 PROBLEM — R26.2 DIFFICULTY WALKING: Status: RESOLVED | Noted: 2024-06-18 | Resolved: 2025-02-13

## 2025-02-13 PROBLEM — M25.675 JOINT STIFFNESS OF LEFT FOOT: Status: RESOLVED | Noted: 2024-06-18 | Resolved: 2025-02-13

## 2025-02-13 PROCEDURE — 99999 PR PBB SHADOW E&M-EST. PATIENT-LVL IV: CPT | Mod: PBBFAC,,, | Performed by: NURSE PRACTITIONER

## 2025-02-13 NOTE — PATIENT INSTRUCTIONS
Counseling and Referral of Other Preventative  (Italic type indicates deductible and co-insurance are waived)    Patient Name: Nano Sosa  Today's Date: 2/13/2025    Health Maintenance       Date Due Completion Date    Mammogram 01/03/2026 1/3/2025    DEXA Scan 01/17/2026 1/17/2024    Colorectal Cancer Screening 02/13/2028 3/19/2024    Lipid Panel 07/19/2029 7/19/2024        No orders of the defined types were placed in this encounter.    The following information is provided to all patients.  This information is to help you find resources for any of the problems found today that may be affecting your health:                  Living healthy guide: www.[facility].[place].gov      Understanding Diabetes: www.diabetes.org      Eating healthy: www.cdc.gov/healthyweight      CDC home safety checklist: www.cdc.gov/steadi/patient.html      Agency on Aging: www.goea.[place].[facility]      Alcoholics anonymous (AA): www.aa.org      Physical Activity: www.yamilka.nih.gov/ja9fmgk      Tobacco use: www.quitwithusla.org

## 2025-02-13 NOTE — PROGRESS NOTES
"  Nano Sosa presented for a follow-up Medicare AWV today. The following components were reviewed and updated:    Medical history  Family History  Social history  Allergies and Current Medications  Health Risk Assessment  Health Maintenance  Care Team    **See Completed Assessments for Annual Wellness visit with in the encounter summary    The following assessments were completed:  Depression Screening  Cognitive function Screening    Timed Get Up Test  Whisper Test      Opioid documentation:      Patient does not have a current opioid prescription.          Vitals:    02/13/25 1248   BP: 122/72   BP Location: Left arm   Pulse: (!) 59   SpO2: 98%   Weight: 94 kg (207 lb 3.7 oz)   Height: 5' 7" (1.702 m)     Body mass index is 32.46 kg/m².       Physical Exam  Constitutional:       Appearance: Normal appearance.   Cardiovascular:      Rate and Rhythm: Normal rate and regular rhythm.   Pulmonary:      Effort: Pulmonary effort is normal.      Breath sounds: Normal breath sounds.   Musculoskeletal:         General: Normal range of motion.   Skin:     General: Skin is warm and dry.   Neurological:      General: No focal deficit present.      Mental Status: She is alert.   Psychiatric:         Mood and Affect: Mood normal.           Diagnoses and health risks identified today and associated recommendations/orders:    1. Encounter for preventive health examination  Here for Health Risk Assessment/Annual Wellness Visit.  Health maintenance reviewed and updated. Follow up in one year.     2. Pulmonary nodules  Chronic, stable. Requesting evaluation/imaging . Note sent to PCP.    3. Postmenopausal osteoporosis  Chronic, stable. Pharmacological treatment on hold until dental work/implants completed.  Followed by PCP, Endocrinology.     4. Mild vitamin D deficiency  Chronic, stable on current medication. Followed by PCP.     5. Thyroid nodule  Chronic, stable. Thyroid studies WNL. Followed by PCP, Endocrinology.    6. " Ulcerative colitis without complications, unspecified location  Chronic, stable. Mesalamine discontinued per GI. Followed by PCP, Gastroenterology    7. Encounter for Medicare annual wellness exam  - Referral to Enhanced Annual Wellness Visit (eAWV) W+1      Provided Nano with a 5-10 year written screening schedule and personal prevention plan. Recommendations were developed using the USPSTF age appropriate recommendations. Education, counseling, and referrals were provided as needed.  After Visit Summary printed and given to patient which includes a list of additional screenings\tests needed.    Follow up in about 9 months (around 11/13/2025).with PCP      Elvira Davis NP

## 2025-02-13 NOTE — Clinical Note
Ms. Sosa was here for her HRA. She had previously undergone imaging to evaluate a pulmonary nodule. She is requesting a imaging  to evaluate the nodule. Last CT of chest was 10-31/22. Thanks

## 2025-02-14 ENCOUNTER — OFFICE VISIT (OUTPATIENT)
Dept: OPTOMETRY | Facility: CLINIC | Age: 70
End: 2025-02-14
Payer: MEDICARE

## 2025-02-14 DIAGNOSIS — H25.13 NUCLEAR SCLEROSIS, BILATERAL: Primary | ICD-10-CM

## 2025-02-14 DIAGNOSIS — Z13.5 GLAUCOMA SCREENING: ICD-10-CM

## 2025-02-14 DIAGNOSIS — H52.4 PRESBYOPIA: ICD-10-CM

## 2025-02-14 PROCEDURE — 99999 PR PBB SHADOW E&M-EST. PATIENT-LVL III: CPT | Mod: PBBFAC,,, | Performed by: OPTOMETRIST

## 2025-02-14 NOTE — PROGRESS NOTES
HPI    68 Y/o female is here for routine eye exam with no C/o about ocular health      Pt denies pain and discomfort   Occ flashes with Ocular Migraine     Eye med: no gtt   Last edited by En Bennett MA on 2/14/2025  9:33 AM.            Assessment /Plan     For exam results, see Encounter Report.    Nuclear sclerosis, bilateral    Glaucoma screening    Presbyopia      1. Small cats OU--pt wishes new Rx   2. Cluster Migraine hx    PLAN:    rtc 1 yr

## 2025-04-01 ENCOUNTER — ANESTHESIA (OUTPATIENT)
Dept: ENDOSCOPY | Facility: HOSPITAL | Age: 70
End: 2025-04-01
Payer: MEDICARE

## 2025-04-01 ENCOUNTER — ANESTHESIA EVENT (OUTPATIENT)
Dept: ENDOSCOPY | Facility: HOSPITAL | Age: 70
End: 2025-04-01
Payer: MEDICARE

## 2025-04-01 ENCOUNTER — HOSPITAL ENCOUNTER (OUTPATIENT)
Facility: HOSPITAL | Age: 70
Discharge: HOME | End: 2025-04-01
Attending: INTERNAL MEDICINE | Admitting: INTERNAL MEDICINE
Payer: MEDICARE

## 2025-04-01 VITALS
HEART RATE: 68 BPM | BODY MASS INDEX: 31.39 KG/M2 | RESPIRATION RATE: 16 BRPM | HEIGHT: 67 IN | SYSTOLIC BLOOD PRESSURE: 154 MMHG | WEIGHT: 200 LBS | OXYGEN SATURATION: 97 % | TEMPERATURE: 98 F | DIASTOLIC BLOOD PRESSURE: 66 MMHG

## 2025-04-01 DIAGNOSIS — K51.90 ULCERATIVE COLITIS WITHOUT COMPLICATIONS, UNSPECIFIED LOCATION: Primary | ICD-10-CM

## 2025-04-01 DIAGNOSIS — K51.90 ULCERATIVE COLITIS: ICD-10-CM

## 2025-04-01 PROCEDURE — 45331 SIGMOIDOSCOPY AND BIOPSY: CPT | Performed by: INTERNAL MEDICINE

## 2025-04-01 PROCEDURE — 88305 TISSUE EXAM BY PATHOLOGIST: CPT | Mod: TC | Performed by: INTERNAL MEDICINE

## 2025-04-01 PROCEDURE — E9220 PRA ENDO ANESTHESIA: HCPCS | Mod: ,,, | Performed by: NURSE ANESTHETIST, CERTIFIED REGISTERED

## 2025-04-01 PROCEDURE — 45331 SIGMOIDOSCOPY AND BIOPSY: CPT | Mod: ,,, | Performed by: INTERNAL MEDICINE

## 2025-04-01 PROCEDURE — 25000003 PHARM REV CODE 250: Performed by: INTERNAL MEDICINE

## 2025-04-01 PROCEDURE — 37000008 HC ANESTHESIA 1ST 15 MINUTES: Performed by: INTERNAL MEDICINE

## 2025-04-01 PROCEDURE — 27201012 HC FORCEPS, HOT/COLD, DISP: Performed by: INTERNAL MEDICINE

## 2025-04-01 PROCEDURE — 63600175 PHARM REV CODE 636 W HCPCS: Performed by: NURSE ANESTHETIST, CERTIFIED REGISTERED

## 2025-04-01 PROCEDURE — 37000009 HC ANESTHESIA EA ADD 15 MINS: Performed by: INTERNAL MEDICINE

## 2025-04-01 RX ORDER — SODIUM CHLORIDE 9 MG/ML
INJECTION, SOLUTION INTRAVENOUS CONTINUOUS
Status: DISCONTINUED | OUTPATIENT
Start: 2025-04-01 | End: 2025-04-01 | Stop reason: HOSPADM

## 2025-04-01 RX ORDER — PROPOFOL 10 MG/ML
VIAL (ML) INTRAVENOUS
Status: DISCONTINUED | OUTPATIENT
Start: 2025-04-01 | End: 2025-04-01

## 2025-04-01 RX ORDER — LIDOCAINE HYDROCHLORIDE 20 MG/ML
INJECTION INTRAVENOUS
Status: DISCONTINUED | OUTPATIENT
Start: 2025-04-01 | End: 2025-04-01

## 2025-04-01 RX ADMIN — PROPOFOL 90 MG: 10 INJECTION, EMULSION INTRAVENOUS at 02:04

## 2025-04-01 RX ADMIN — PROPOFOL 150 MCG/KG/MIN: 10 INJECTION, EMULSION INTRAVENOUS at 02:04

## 2025-04-01 RX ADMIN — SODIUM CHLORIDE: 0.9 INJECTION, SOLUTION INTRAVENOUS at 02:04

## 2025-04-01 RX ADMIN — LIDOCAINE HYDROCHLORIDE 100 MG: 20 INJECTION INTRAVENOUS at 02:04

## 2025-04-01 NOTE — PROVATION PATIENT INSTRUCTIONS
Discharge Summary/Instructions after an Endoscopic Procedure  Patient Name: Nano Sosa  Patient MRN: [id number]  Patient YOB: 1955 Tuesday, April 1, 2025  Srinivas Linares MD  Dear patient,  As a result of recent federal legislation (The Federal Cures Act), you may   receive lab or pathology results from your procedure in your [facility]   account before your physician is able to contact you. Your physician or   their representative will relay the results to you with their   recommendations at their soonest availability.  Thank you,  RESTRICTIONS:  During your procedure today, you received medications for sedation.  These   medications may affect your judgment, balance and coordination.  Therefore,   for 24 hours, you have the following restrictions:   - DO NOT drive a car, operate machinery, make legal/financial decisions,   sign important papers or drink alcohol.    ACTIVITY:  Today: no heavy lifting, straining or running due to procedural   sedation/anesthesia.  The following day: return to full activity including work.  DIET:  Eat and drink normally unless instructed otherwise.     TREATMENT FOR COMMON SIDE EFFECTS:  - Mild abdominal pain, nausea, belching, bloating or excessive gas:  rest,   eat lightly and use a heating pad.  - Sore Throat: treat with throat lozenges and/or gargle with warm salt   water.  - Because air was used during the procedure, expelling large amounts of air   from your rectum or belching is normal.  - If a bowel prep was taken, you may not have a bowel movement for 1-3 days.    This is normal.  SYMPTOMS TO WATCH FOR AND REPORT TO YOUR PHYSICIAN:  1. Abdominal pain or bloating, other than gas cramps.  2. Chest pain.  3. Back pain.  4. Signs of infection such as: chills or fever occurring within 24 hours   after the procedure.  5. Rectal bleeding, which would show as bright red, maroon, or black stools.   (A tablespoon of blood from the rectum is not serious, especially  if   hemorrhoids are present.)  6. Vomiting.  7. Weakness or dizziness.  GO DIRECTLY TO THE NEAREST EMERGENCY ROOM IF YOU HAVE ANY OF THE FOLLOWING:      Difficulty breathing              Chills and/or fever over 101 F   Persistent vomiting and/or vomiting blood   Severe abdominal pain   Severe chest pain   Black, tarry stools   Bleeding- more than one tablespoon   Any other symptom or condition that you feel may need urgent attention  Your doctor recommends these additional instructions:  If any biopsies were taken, your doctors clinic will contact you in 1 to 2   weeks with any results.  - Discharge patient to home.   - Patient has a contact number available for emergencies.  The signs and   symptoms of potential delayed complications were discussed with the   patient.  Return to normal activities tomorrow.  Written discharge   instructions were provided to the patient.   - Resume previous diet.   - Await pathology results.   For questions, problems or results please call your physician - Srinivas Linares MD at Work:  [phone].  [facility] [place], EMERGENCY ROOM PHONE NUMBER: [phone]  IF A COMPLICATION OR EMERGENCY SITUATION ARISES AND YOU ARE UNABLE TO REACH   YOUR PHYSICIAN - GO DIRECTLY TO THE EMERGENCY ROOM.  Srinivas Linares MD  4/1/2025 2:30:20 PM  This report has been verified and signed electronically.  Dear patient,  As a result of recent federal legislation (The Federal Cures Act), you may   receive lab or pathology results from your procedure in your [facility]   account before your physician is able to contact you. Your physician or   their representative will relay the results to you with their   recommendations at their soonest availability.  Thank you,  PROVATION

## 2025-04-01 NOTE — ANESTHESIA PREPROCEDURE EVALUATION
04/01/2025  Pre-operative evaluation for Procedure(s) (LRB):  SIGMOIDOSCOPY, FLEXIBLE (N/A)    Nano Sosa is a 69 y.o. female     Past Medical History:   Diagnosis Date    General anesthetics causing adverse effect in therapeutic use     wakes up hyperventilating/chills    Goiter     Hashimoto's disease     Migraine     Osteopenia     Rectal mass     Ulcerative colitis     Urinary tract infection     Vitamin D deficiency      Problem List[1]  Review of patient's allergies indicates:   Allergen Reactions    Penicillins Shortness Of Breath     Throat swelling    Poison ivy extract Dermatitis, Hives, Itching, Rash and Swelling    Poison sumac extract Blisters, Dermatitis, Hives, Itching, Rash and Swelling    Sulfa (sulfonamide antibiotics) Hives and Rash    Codeine Itching     Hallucinations, sweats    Erythromycin Itching and Swelling    Tessalon [benzonatate] Swelling       Medications Ordered Prior to Encounter[2]    Past Surgical History:   Procedure Laterality Date    BUNIONECTOMY      right    BUNIONECTOMY Left 03/15/2024    Procedure: BUNIONECTOMY;  Surgeon: Ethan Vaughn DPM;  Location: [facility];  Service: Podiatry;  Laterality: Left;    colon sx      COLONOSCOPY N/A 12/19/2016    Procedure: COLONOSCOPY;  Surgeon: Alli Peña MD;  [facility]  Service: Endoscopy;  Laterality: N/A;    COLONOSCOPY N/A 01/07/2019    Procedure: COLONOSCOPY;  Surgeon: Vel Self MD;  [address]  Service: Endoscopy;  Laterality: N/A;  Pt requests Dr. Peña, Pt ok with Dr. Self    COLONOSCOPY N/A 02/13/2023    Procedure: COLONOSCOPY;  Surgeon: Srinivas Linares MD;  [facility]  Service: Endoscopy;  Laterality: N/A;  instructions via portal -   pt request Sutab  chgd from [place] to [place]--1/17  Precall complete- [place]    f.e.s.s.   "12/05/2014    FACIAL COSMETIC SURGERY      FLEXIBLE SIGMOIDOSCOPY N/A 03/19/2024    Procedure: SIGMOIDOSCOPY, FLEXIBLE;  Surgeon: Srinivas Linares MD;  Location: [facility] ([address] [place] [address]);  Service: Endoscopy;  Laterality: N/A;  2/16 r/s due to a change in doctor's randall.,sent updated instr via portal.AC  Ref By: , instr sent via portal. AC  2/9-lvm for precall-MS  3/13-lvm for precall-MS  3/13-precall complete-Kpvt    FOOT HARDWARE REMOVAL Right 03/15/2024    Procedure: REMOVAL, HARDWARE, FOOT;  Surgeon: Ethan Vaughn DPM;  Location: [facility];  Service: Podiatry;  Laterality: Right;    MOUTH SURGERY  12/2024    TOTAL ABDOMINAL HYSTERECTOMY  1994    ISIDRO       CBC: No results for input(s): "WBC", "HGB", "HCT", "PLT" in the last 72 hours.    CMP: No results for input(s): "NA", "K", "CL", "CO2", "BUN", "CREATININE", "GLU", "MG", "PHOS", "CALCIUM", "ALBUMIN", "PROT", "ALKPHOS", "ALT", "AST", "BILITOT" in the last 72 hours.    COAGS  No results for input(s): "PT", "INR", "PROTIME", "APTT" in the last 72 hours.    BP Readings from Last 3 Encounters:   02/13/25 122/72   11/13/24 131/81   11/06/24 132/76       Diagnostic Studies:    EKG:  Results for orders placed or performed during the hospital encounter of 02/29/24   EKG 12-lead    Collection Time: 02/29/24  2:20 PM   Result Value Ref Range    QRS Duration 90 ms    [facility] QTC Calculation 428 ms    Narrative    Test Reason : Z01.810,    Vent. Rate : 054 BPM     Atrial Rate : 054 BPM     P-R Int : 000 ms          QRS Dur : 090 ms      QT Int : 452 ms       P-R-T Axes : 000 070 063 degrees     QTc Int : 428 ms    Normal sinus rhythm  Normal ECG  When compared with ECG of 07-OCT-2021 07:55,  Questionable change in The axis  Nonspecific T wave abnormality no longer evident in Inferior leads  Confirmed by Milton PEREZ, Jeremiah BAUMAN ([id number]) on 2/29/2024 3:17:54 PM    Referred By: AMADOU MCMILLAN           Confirmed By:Jeremiah Rose MD       2D Echo:  No results found for this or any " previous visit.        Pre-op Assessment    I have reviewed the Patient Summary Reports.     I have reviewed the Nursing Notes. I have reviewed the NPO Status.   I have reviewed the Medications.     Review of Systems  Anesthesia Hx:  No problems with previous Anesthesia             Denies Family Hx of Anesthesia complications.    Denies Personal Hx of Anesthesia complications.                    Hematology/Oncology:  Hematology Normal                                     Cardiovascular:  Cardiovascular Normal                                              Hepatic/GI:   PUD,                  Musculoskeletal:  Musculoskeletal Normal                Neurological:      Headaches                                 Dermatological:  Skin Normal        Physical Exam  General: Cooperative, Alert and Oriented    Airway:  Mallampati: II   Mouth Opening: Normal  TM Distance: Normal  Tongue: Normal  Neck ROM: Normal ROM    Dental:  Intact        Anesthesia Plan  Type of Anesthesia, risks & benefits discussed:    Anesthesia Type: Gen Natural Airway  Intra-op Monitoring Plan: Standard ASA Monitors  Induction:  IV  Informed Consent: Informed consent signed with the Patient and all parties understand the risks and agree with anesthesia plan.  All questions answered.   ASA Score: 2  Day of Surgery Review of History & Physical: H&P Update referred to the surgeon/provider.    Ready For Surgery From Anesthesia Perspective.     .           [1]   Patient Active Problem List  Diagnosis    Thyroid nodule    History of Hashimoto thyroiditis    Environmental allergies    History of colon polyps    Mild vitamin D deficiency    Rectal mass    Pulmonary nodules    Colon polyp    Postmenopausal osteoporosis    Ulcerative colitis without complications, unspecified location   [2]   No current facility-administered medications on file prior to encounter.     Current Outpatient Medications on File Prior to Encounter   Medication Sig Dispense Refill     acetaminophen (TYLENOL) 325 MG tablet Take 2 tablets (650 mg total) by mouth every 6 (six) hours as needed for Pain. 30 tablet 0    b complex vitamins tablet Take 1 tablet by mouth once daily.      calcium carbonate (OS-CONRAD) 600 mg calcium (1,500 mg) Tab Take 600 mg by mouth once daily.      cholecalciferol, vitamin D3, (VITAMIN D3) 50 mcg (2,000 unit) Cap capsule Take 4,000 Units by mouth once daily.      diclofenac sodium (VOLTAREN) 1 % Gel Apply 2 g topically 4 (four) times daily. (Patient taking differently: Apply 2 g topically 4 (four) times daily as needed.) 100 g 2    ferrous sulfate (FEOSOL) Tab tablet Take 1 tablet by mouth daily with breakfast.      fluticasone propionate (FLONASE) 50 mcg/actuation nasal spray by Each Nostril route daily as needed for Allergies.      mesalamine (CANASA) 1000 MG Supp Place 1 suppository (1,000 mg total) rectally nightly. 90 suppository 2    multivitamin capsule Take 1 capsule by mouth once daily.      [DISCONTINUED] teriparatide 20 mcg/dose (620mcg/2.48mL) PnRashid Inject 20 mcg into the skin Daily. (Patient not taking: Reported on 1/9/2025) 2.48 mL 11

## 2025-04-01 NOTE — H&P
Short Stay Endoscopy History and Physical    PCP - Lauren Fountain MD  Referring Physician - Srinivas Linares MD  [address] [place] [address]    Procedure - Flexible Sigmoidoscopy  ASA - per anesthesia  Mallampati - per anesthesia  History of Anesthesia problems - no  Family history Anesthesia problems -  no   Plan of anesthesia - General    HPI  69 y.o. female  Reason for procedure: ulcerative colitis f/u    ROS:  Constitutional: No fevers, chills, No weight loss  CV: No chest pain  Pulm: No cough, No shortness of breath  GI: see HPI    Medical History:  has a past medical history of General anesthetics causing adverse effect in therapeutic use, Goiter, Hashimoto's disease, Migraine, Osteopenia, Rectal mass, Ulcerative colitis, Urinary tract infection, and Vitamin D deficiency.    Surgical History:  has a past surgical history that includes Facial cosmetic surgery; Bunionectomy; f.e.s.s. (12/05/2014); colon sx; Colonoscopy (N/A, 12/19/2016); Total abdominal hysterectomy (1994); Colonoscopy (N/A, 01/07/2019); Colonoscopy (N/A, 02/13/2023); Bunionectomy (Left, 03/15/2024); Foot hardware removal (Right, 03/15/2024); Flexible sigmoidoscopy (N/A, 03/19/2024); and Mouth surgery (12/2024).    Family History: family history includes Breast cancer in her maternal aunt; Colon cancer in her maternal aunt; Heart disease in her maternal uncle; Hypertension in her maternal uncle; Migraines in her mother; Rheum arthritis in her mother.. Otherwise no colon cancer, inflammatory bowel disease, or GI malignancies.    Social History:  reports that she quit smoking about 25 years ago. Her smoking use included cigarettes. She started smoking about 55 years ago. She has a 30 pack-year smoking history. She has never been exposed to tobacco smoke. She has never used smokeless tobacco. She reports current alcohol use of about 1.0 standard drink of alcohol per week. She reports that she does not use drugs.    Review of  patient's allergies indicates:   Allergen Reactions    Penicillins Shortness Of Breath     Throat swelling    Poison ivy extract Dermatitis, Hives, Itching, Rash and Swelling    Poison sumac extract Blisters, Dermatitis, Hives, Itching, Rash and Swelling    Sulfa (sulfonamide antibiotics) Hives and Rash    Codeine Itching     Hallucinations, sweats    Erythromycin Itching and Swelling    Tessalon [benzonatate] Swelling       Medications:   Prescriptions Prior to Admission[1]    Physical Exam:    Vital Signs:   Vitals:    04/01/25 1246   BP: 128/63   Pulse: (!) 56   Resp: 16   Temp: 97.2 °F (36.2 °C)       General Appearance: Well appearing in no acute distress  Abdomen: Soft, non tender, non distended with normal bowel sounds, no masses    Labs:  Lab Results   Component Value Date    WBC 6.68 07/19/2024    HGB 12.3 07/19/2024    HCT 38.7 07/19/2024     07/19/2024    CHOL 202 (H) 07/19/2024    TRIG 80 07/19/2024    HDL 72 07/19/2024    ALT 15 07/19/2024    AST 18 07/19/2024     07/19/2024    K 4.3 07/19/2024     07/19/2024    CREATININE 0.8 07/19/2024    BUN 21 07/19/2024    CO2 25 07/19/2024    TSH 1.880 07/19/2024    INR 1.0 02/29/2024    HGBA1C 5.2 07/19/2024       I have explained the risks and benefits of this endoscopic procedure to the patient including but not limited to bleeding, inflammation, infection, perforation, and death.      Srinivas Linares MD         [1]   Medications Prior to Admission   Medication Sig Dispense Refill Last Dose/Taking    acetaminophen (TYLENOL) 325 MG tablet Take 2 tablets (650 mg total) by mouth every 6 (six) hours as needed for Pain. 30 tablet 0 Past Week    b complex vitamins tablet Take 1 tablet by mouth once daily.   3/31/2025    calcium carbonate (OS-CONRAD) 600 mg calcium (1,500 mg) Tab Take 600 mg by mouth once daily.   3/31/2025    cholecalciferol, vitamin D3, (VITAMIN D3) 50 mcg (2,000 unit) Cap capsule Take 4,000 Units by mouth once daily.   3/31/2025     ferrous sulfate (FEOSOL) Tab tablet Take 1 tablet by mouth daily with breakfast.   3/31/2025    fluticasone propionate (FLONASE) 50 mcg/actuation nasal spray by Each Nostril route daily as needed for Allergies.   Past Month    mesalamine (CANASA) 1000 MG Supp Place 1 suppository (1,000 mg total) rectally nightly. 90 suppository 2 3/31/2025    multivitamin capsule Take 1 capsule by mouth once daily.   3/31/2025    diclofenac sodium (VOLTAREN) 1 % Gel Apply 2 g topically 4 (four) times daily. (Patient taking differently: Apply 2 g topically 4 (four) times daily as needed.) 100 g 2

## 2025-04-01 NOTE — TRANSFER OF CARE
"Anesthesia Transfer of Care Note    Patient: Nano Sosa    Procedure(s) Performed: Procedure(s) (LRB):  SIGMOIDOSCOPY, FLEXIBLE (N/A)    Patient location: GI    Anesthesia Type: general    Transport from OR: Transported from OR on room air with adequate spontaneous ventilation    Post pain: adequate analgesia    Post assessment: no apparent anesthetic complications and tolerated procedure well    Post vital signs: stable    Level of consciousness: awake and alert    Nausea/Vomiting: no nausea/vomiting    Complications: none    Transfer of care protocol was followed      Last vitals: Visit Vitals  /61 (BP Location: Left arm)   Pulse 64   Temp 36.6 °C (97.9 °F) (Temporal)   Resp 16   Ht 5' 7" (1.702 m)   Wt 90.7 kg (200 lb)   SpO2 98%   Breastfeeding No   BMI 31.32 kg/m²     "

## 2025-04-01 NOTE — ANESTHESIA POSTPROCEDURE EVALUATION
Anesthesia Post Evaluation    Patient: Nano Sosa    Procedure(s) Performed: Procedure(s) (LRB):  SIGMOIDOSCOPY, FLEXIBLE (N/A)    Final Anesthesia Type: general      Patient location during evaluation: GI PACU  Patient participation: Yes- Able to Participate  Level of consciousness: awake and alert  Post-procedure vital signs: reviewed and stable  Pain management: adequate  Airway patency: patent    PONV status at discharge: No PONV  Anesthetic complications: no      Cardiovascular status: blood pressure returned to baseline  Respiratory status: unassisted  Hydration status: euvolemic  Follow-up not needed.              Vitals Value Taken Time   /66 04/01/25 15:02   Temp 36.6 °C (97.9 °F) 04/01/25 14:33   Pulse 68 04/01/25 15:02   Resp 16 04/01/25 15:02   SpO2 97 % 04/01/25 15:02         No case tracking events are documented in the log.      Pain/Debi Score: Debi Score: 10 (4/1/2025  2:34 PM)

## 2025-04-04 ENCOUNTER — RESULTS FOLLOW-UP (OUTPATIENT)
Dept: GASTROENTEROLOGY | Facility: CLINIC | Age: 70
End: 2025-04-04

## 2025-04-04 LAB
ESTROGEN SERPL-MCNC: NORMAL PG/ML
INSULIN SERPL-ACNC: NORMAL U[IU]/ML
LAB AP CLINICAL INFORMATION: NORMAL
LAB AP GROSS DESCRIPTION: NORMAL
LAB AP PERFORMING LOCATION(S): NORMAL
LAB AP REPORT FOOTNOTES: NORMAL
T3RU NFR SERPL: NORMAL %

## 2025-05-02 ENCOUNTER — PATIENT MESSAGE (OUTPATIENT)
Dept: GASTROENTEROLOGY | Facility: CLINIC | Age: 70
End: 2025-05-02
Payer: MEDICARE

## 2025-05-10 ENCOUNTER — PATIENT MESSAGE (OUTPATIENT)
Dept: INTERNAL MEDICINE | Facility: CLINIC | Age: 70
End: 2025-05-10
Payer: MEDICARE

## 2025-05-10 ENCOUNTER — PATIENT MESSAGE (OUTPATIENT)
Dept: PODIATRY | Facility: CLINIC | Age: 70
End: 2025-05-10
Payer: MEDICARE

## 2025-05-19 ENCOUNTER — OFFICE VISIT (OUTPATIENT)
Dept: PODIATRY | Facility: CLINIC | Age: 70
End: 2025-05-19
Payer: MEDICARE

## 2025-05-19 ENCOUNTER — HOSPITAL ENCOUNTER (OUTPATIENT)
Dept: RADIOLOGY | Facility: HOSPITAL | Age: 70
Discharge: HOME | End: 2025-05-19
Attending: PODIATRIST
Payer: MEDICARE

## 2025-05-19 VITALS
DIASTOLIC BLOOD PRESSURE: 70 MMHG | WEIGHT: 208.31 LBS | BODY MASS INDEX: 32.7 KG/M2 | HEART RATE: 67 BPM | HEIGHT: 67 IN | SYSTOLIC BLOOD PRESSURE: 114 MMHG

## 2025-05-19 DIAGNOSIS — M79.671 PAIN IN BOTH FEET: ICD-10-CM

## 2025-05-19 DIAGNOSIS — M21.619 BUNION OF GREAT TOE: ICD-10-CM

## 2025-05-19 DIAGNOSIS — M19.071 PRIMARY OSTEOARTHRITIS OF RIGHT FOOT: ICD-10-CM

## 2025-05-19 DIAGNOSIS — M79.671 PAIN IN BOTH FEET: Primary | ICD-10-CM

## 2025-05-19 DIAGNOSIS — M79.672 PAIN IN BOTH FEET: Primary | ICD-10-CM

## 2025-05-19 DIAGNOSIS — M79.672 PAIN IN BOTH FEET: ICD-10-CM

## 2025-05-19 PROCEDURE — 1125F AMNT PAIN NOTED PAIN PRSNT: CPT | Mod: CPTII,S$GLB,, | Performed by: PODIATRIST

## 2025-05-19 PROCEDURE — 3008F BODY MASS INDEX DOCD: CPT | Mod: CPTII,S$GLB,, | Performed by: PODIATRIST

## 2025-05-19 PROCEDURE — 3288F FALL RISK ASSESSMENT DOCD: CPT | Mod: CPTII,S$GLB,, | Performed by: PODIATRIST

## 2025-05-19 PROCEDURE — 3074F SYST BP LT 130 MM HG: CPT | Mod: CPTII,S$GLB,, | Performed by: PODIATRIST

## 2025-05-19 PROCEDURE — 73630 X-RAY EXAM OF FOOT: CPT | Mod: TC,RT

## 2025-05-19 PROCEDURE — 1159F MED LIST DOCD IN RCRD: CPT | Mod: CPTII,S$GLB,, | Performed by: PODIATRIST

## 2025-05-19 PROCEDURE — 1101F PT FALLS ASSESS-DOCD LE1/YR: CPT | Mod: CPTII,S$GLB,, | Performed by: PODIATRIST

## 2025-05-19 PROCEDURE — 99999 PR PBB SHADOW E&M-EST. PATIENT-LVL III: CPT | Mod: PBBFAC,,, | Performed by: PODIATRIST

## 2025-05-19 PROCEDURE — 73630 X-RAY EXAM OF FOOT: CPT | Mod: 26,RT,, | Performed by: RADIOLOGY

## 2025-05-19 PROCEDURE — 3078F DIAST BP <80 MM HG: CPT | Mod: CPTII,S$GLB,, | Performed by: PODIATRIST

## 2025-05-19 PROCEDURE — 99214 OFFICE O/P EST MOD 30 MIN: CPT | Mod: S$GLB,,, | Performed by: PODIATRIST

## 2025-05-19 RX ORDER — DICLOFENAC SODIUM 10 MG/G
2 GEL TOPICAL 4 TIMES DAILY
Qty: 100 G | Refills: 2 | Status: SHIPPED | OUTPATIENT
Start: 2025-05-19

## 2025-05-19 RX ORDER — METHYLPREDNISOLONE 4 MG/1
TABLET ORAL
Qty: 21 TABLET | Refills: 1 | Status: SHIPPED | OUTPATIENT
Start: 2025-05-19 | End: 2025-06-09

## 2025-05-19 NOTE — PROGRESS NOTES
Subjective:      Patient ID: Nano Sosa is a 70 y.o. female.    Chief Complaint:   Foot Pain (Right foot throbbing pain at night constant)    Nano is a 70 y.o. female who presents to the podiatry clinic  with complaint of  right foot pain. Onset of the symptoms was several weeks ago. Precipitating event: increased activity. Current symptoms include: ability to bear weight, but with some pain. Aggravating factors: any weight bearing. Symptoms have gradually improved. Patient has had no prior foot problems. Evaluation to date: none. Treatment to date: avoidance of offending activity. Patients rates pain 5/10 on pain scale.    Review of Systems   Constitutional: Negative for chills, decreased appetite, fever and malaise/fatigue.   HENT:  Negative for congestion, hearing loss, nosebleeds and tinnitus.    Eyes:  Negative for double vision, pain, photophobia and visual disturbance.   Cardiovascular:  Negative for chest pain, claudication, cyanosis and leg swelling.   Respiratory:  Negative for cough, hemoptysis, shortness of breath and wheezing.    Endocrine: Negative for cold intolerance and heat intolerance.   Hematologic/Lymphatic: Negative for adenopathy and bleeding problem.   Skin:  Negative for color change, dry skin, itching, nail changes and suspicious lesions.   Musculoskeletal:  Positive for arthritis, joint pain and stiffness. Negative for myalgias.   Gastrointestinal:  Negative for abdominal pain, jaundice, nausea and vomiting.   Genitourinary:  Negative for dysuria, frequency and hematuria.   Neurological:  Negative for difficulty with concentration, loss of balance, numbness, paresthesias and sensory change.   Psychiatric/Behavioral:  Negative for altered mental status, hallucinations and suicidal ideas. The patient is not nervous/anxious.    Allergic/Immunologic: Negative for environmental allergies and persistent infections.           Objective:      Physical Exam  Vitals reviewed.    Constitutional:       Appearance: She is well-developed.   HENT:      Head: Normocephalic and atraumatic.   Cardiovascular:      Pulses:           Dorsalis pedis pulses are 2+ on the right side and 2+ on the left side.        Posterior tibial pulses are 2+ on the right side and 2+ on the left side.   Pulmonary:      Effort: Pulmonary effort is normal.   Musculoskeletal:         General: Normal range of motion.      Comments: Inspection and palpation of the muscles joints and bones of both lower extremities reveal that muscle strength for the anterior lateral and posterior muscle groups and intrinsic muscle groups of the foot are all 5 over 5 symmetrical.      Tenderness right midfoot over the 1st 2nd and 3rd metatarsal cuneiform joints.  Palpable exostoses noted.  No significant edema.     Skin:     General: Skin is warm and dry.      Capillary Refill: Capillary refill takes 2 to 3 seconds.      Comments: Skin turgor is normal bilaterally.  Skin texture is well hydrated to both lower extremities.  No lesions or rashes or wounds appreciated bilaterally.  Nail plates 1 through 5 bilaterally are within normal limits for length and thickness.  No nail clubbing or incurvation noted.   Neurological:      Mental Status: She is alert and oriented to person, place, and time.      Comments: Sharp dull light touch vibratory proprioceptive sensation are intact bilaterally.  Deep tendon reflexes to patellar and Achilles tendon are symmetrical 2 over 4 bilaterally.  No ankle clonus or Babinski reflexes noted bilaterally.  Coordination is normal to both feet and lower extremities.   Psychiatric:         Behavior: Behavior normal.               Assessment:       Encounter Diagnoses   Name Primary?    Pain in both feet Yes    Primary osteoarthritis of right foot     Bunion of great toe      Independent visualization of imaging was performed.  Results were reviewed in detail with patient.       Plan:       Nano was seen today for  foot pain.    Diagnoses and all orders for this visit:    Pain in both feet  -     diclofenac sodium (VOLTAREN) 1 % Gel; Apply 2 g topically 4 (four) times daily.  -     methylPREDNISolone (MEDROL DOSEPACK) 4 mg tablet; use as directed  -     X-Ray Foot Complete Right; Future    Primary osteoarthritis of right foot    Bunion of great toe      I counseled the patient on her conditions, their implications and medical management.    Ongoing monitoring, evaluating, assessing, and treatment options are recognized and reviewed in detail with the patient.    I recommended patient be fitted for orthoses.  I explained that orthoses may improve function of the foot, reduce pain, decrease pronation, increase efficiency of muscle function of the foot and ankle and prevent surgery.  Alternative forms of biomechanical control of the foot and ankle were discussed with the patient.      Begin ice/topical wound anti-inflammatories.  Medrol Dosepak prescribed.  Begin orthotic therapy.  Follow-up in 2 months.

## 2025-05-22 ENCOUNTER — HOSPITAL ENCOUNTER (OUTPATIENT)
Dept: RADIOLOGY | Facility: HOSPITAL | Age: 70
Discharge: HOME | End: 2025-05-22
Attending: INTERNAL MEDICINE
Payer: MEDICARE

## 2025-05-22 DIAGNOSIS — R91.1 SOLITARY PULMONARY NODULE: ICD-10-CM

## 2025-05-22 PROCEDURE — 71250 CT THORAX DX C-: CPT | Mod: TC

## 2025-09-02 ENCOUNTER — E-CONSULT (OUTPATIENT)
Dept: PULMONOLOGY | Facility: CLINIC | Age: 70
End: 2025-09-02
Payer: MEDICARE

## 2025-09-02 ENCOUNTER — E-CONSULT (OUTPATIENT)
Dept: HEPATOLOGY | Facility: CLINIC | Age: 70
End: 2025-09-02
Payer: MEDICARE

## 2025-09-02 ENCOUNTER — OFFICE VISIT (OUTPATIENT)
Dept: INTERNAL MEDICINE | Facility: CLINIC | Age: 70
End: 2025-09-02
Payer: MEDICARE

## 2025-09-02 VITALS
HEIGHT: 67 IN | DIASTOLIC BLOOD PRESSURE: 66 MMHG | HEART RATE: 60 BPM | SYSTOLIC BLOOD PRESSURE: 100 MMHG | BODY MASS INDEX: 32.63 KG/M2 | OXYGEN SATURATION: 96 %

## 2025-09-02 DIAGNOSIS — K76.89 HEPATIC CYST: ICD-10-CM

## 2025-09-02 DIAGNOSIS — R93.89 ABNORMAL CT OF THE CHEST: Primary | ICD-10-CM

## 2025-09-02 DIAGNOSIS — Z23 NEED FOR VACCINATION: ICD-10-CM

## 2025-09-02 DIAGNOSIS — R91.8 PULMONARY NODULES: ICD-10-CM

## 2025-09-02 DIAGNOSIS — E04.1 THYROID NODULE: ICD-10-CM

## 2025-09-02 DIAGNOSIS — K76.89 LIVER CYST: Primary | ICD-10-CM

## 2025-09-02 DIAGNOSIS — M54.16 LUMBAR RADICULOPATHY: ICD-10-CM

## 2025-09-02 DIAGNOSIS — Z00.00 ENCOUNTER FOR ANNUAL PHYSICAL EXAM: Primary | ICD-10-CM

## 2025-09-02 PROCEDURE — 99451 NTRPROF PH1/NTRNET/EHR 5/>: CPT | Mod: S$GLB,,, | Performed by: INTERNAL MEDICINE

## 2025-09-02 PROCEDURE — 99999 PR PBB SHADOW E&M-EST. PATIENT-LVL IV: CPT | Mod: PBBFAC,,, | Performed by: INTERNAL MEDICINE

## (undated) DEVICE — DRAPE THREE-QTR REINF 53X77IN

## (undated) DEVICE — Device

## (undated) DEVICE — BLADE SAG MIC FINE 9.5X25.5MM

## (undated) DEVICE — PAD CAST SPECIALIST STRL 4

## (undated) DEVICE — BANDAGE ESMARK 4INX3YD

## (undated) DEVICE — SOL NACL STRL BOTTLE 1000ML

## (undated) DEVICE — SUT MCRYL PLUS 4-0 PS2 27IN

## (undated) DEVICE — BANDAGE ROLL COTTN 4.5INX4.1YD

## (undated) DEVICE — SUT ETHILON 3-0 PS2 18 BLK

## (undated) DEVICE — TRAY MINOR ORTHO OMC

## (undated) DEVICE — CUFF TOURNIQUET DL PRT

## (undated) DEVICE — ELECTRODE REM PLYHSV RETURN 9

## (undated) DEVICE — GAUZE CNFRM STRL 4INX4.1YD

## (undated) DEVICE — GAUZE AVANT SPNG 4PLY STRL 4X4

## (undated) DEVICE — TOWEL OR DISP STRL BLUE 4/PK

## (undated) DEVICE — GOWN NONREINF SET-IN SLV XL

## (undated) DEVICE — NDL HYPO SAFETY 25GX1.5IN

## (undated) DEVICE — DRESSING XEROFORM NONADH 1X8IN

## (undated) DEVICE — FUSEFORCE STAPLE SIZER

## (undated) DEVICE — NDL SAFETY 25G X 1.5 ECLIPSE

## (undated) DEVICE — SUT MONOCRYL 3-0 PS-2 UND

## (undated) DEVICE — DRAPE T EXTRM SURG 121X128X90

## (undated) DEVICE — PAD PREP CUFFED NS 24X48IN

## (undated) DEVICE — DRAPE C-ARM MINI OEC 54X84IN

## (undated) DEVICE — NDL HYPODERMIC BLUNT 18G 1.5IN

## (undated) DEVICE — SYR 10CC LUER LOCK

## (undated) DEVICE — STOCKINETTE TUBULAR 2PL 6 X 4

## (undated) DEVICE — BANDAGE MATRIX HK LOOP 4IN 5YD